# Patient Record
Sex: MALE | Race: WHITE | HISPANIC OR LATINO | Employment: UNEMPLOYED | URBAN - METROPOLITAN AREA
[De-identification: names, ages, dates, MRNs, and addresses within clinical notes are randomized per-mention and may not be internally consistent; named-entity substitution may affect disease eponyms.]

---

## 2020-08-19 ENCOUNTER — ANESTHESIA EVENT (OUTPATIENT)
Dept: SURGERY | Facility: HOSPITAL | Age: 25
DRG: 872 | End: 2020-08-19
Payer: MEDICAID

## 2020-08-19 ENCOUNTER — HOSPITAL ENCOUNTER (INPATIENT)
Facility: HOSPITAL | Age: 25
LOS: 13 days | Discharge: NURSING, REHAB OR LONG-TERM CARE | DRG: 872 | End: 2020-09-01
Attending: EMERGENCY MEDICINE | Admitting: INTERNAL MEDICINE
Payer: MEDICAID

## 2020-08-19 ENCOUNTER — ANESTHESIA (OUTPATIENT)
Dept: SURGERY | Facility: HOSPITAL | Age: 25
DRG: 872 | End: 2020-08-19
Payer: MEDICAID

## 2020-08-19 DIAGNOSIS — R07.9 CHEST PAIN: ICD-10-CM

## 2020-08-19 DIAGNOSIS — R65.10 SIRS (SYSTEMIC INFLAMMATORY RESPONSE SYNDROME): Primary | ICD-10-CM

## 2020-08-19 DIAGNOSIS — F11.20 OPIOID USE DISORDER, SEVERE, DEPENDENCE: Chronic | ICD-10-CM

## 2020-08-19 DIAGNOSIS — F41.9 ANXIETY AND DEPRESSION: ICD-10-CM

## 2020-08-19 DIAGNOSIS — M00.9 PYOGENIC ARTHRITIS OF LEFT SHOULDER REGION, DUE TO UNSPECIFIED ORGANISM: ICD-10-CM

## 2020-08-19 DIAGNOSIS — B18.2 CHRONIC HEPATITIS C WITHOUT HEPATIC COMA: ICD-10-CM

## 2020-08-19 DIAGNOSIS — M00.9 SEPTIC ARTHRITIS OF RIGHT ANKLE, DUE TO UNSPECIFIED ORGANISM: ICD-10-CM

## 2020-08-19 DIAGNOSIS — M25.50 POLYARTHRALGIA: ICD-10-CM

## 2020-08-19 DIAGNOSIS — F11.93 OPIOID WITHDRAWAL: ICD-10-CM

## 2020-08-19 DIAGNOSIS — M25.519 SHOULDER PAIN: ICD-10-CM

## 2020-08-19 DIAGNOSIS — M79.89 LEG SWELLING: ICD-10-CM

## 2020-08-19 DIAGNOSIS — F19.90 IVDU (INTRAVENOUS DRUG USER): ICD-10-CM

## 2020-08-19 DIAGNOSIS — R70.0 ELEVATED ERYTHROCYTE SEDIMENTATION RATE: ICD-10-CM

## 2020-08-19 DIAGNOSIS — M00.9 PYOGENIC ARTHRITIS OF RIGHT KNEE JOINT, DUE TO UNSPECIFIED ORGANISM: ICD-10-CM

## 2020-08-19 DIAGNOSIS — R50.9 FEVER: ICD-10-CM

## 2020-08-19 DIAGNOSIS — F32.A ANXIETY AND DEPRESSION: ICD-10-CM

## 2020-08-19 DIAGNOSIS — M13.89: ICD-10-CM

## 2020-08-19 DIAGNOSIS — R79.82 ELEVATED C-REACTIVE PROTEIN (CRP): ICD-10-CM

## 2020-08-19 DIAGNOSIS — A41.9 SEPSIS: ICD-10-CM

## 2020-08-19 LAB
ALBUMIN SERPL BCP-MCNC: 3.6 G/DL (ref 3.5–5.2)
ALP SERPL-CCNC: 110 U/L (ref 55–135)
ALT SERPL W/O P-5'-P-CCNC: 24 U/L (ref 10–44)
ANION GAP SERPL CALC-SCNC: 10 MMOL/L (ref 8–16)
APPEARANCE FLD: NORMAL
APPEARANCE FLD: NORMAL
AST SERPL-CCNC: 20 U/L (ref 10–40)
BACTERIA #/AREA URNS AUTO: ABNORMAL /HPF
BASOPHILS # BLD AUTO: 0.03 K/UL (ref 0–0.2)
BASOPHILS NFR BLD: 0.3 % (ref 0–1.9)
BILIRUB SERPL-MCNC: 0.9 MG/DL (ref 0.1–1)
BILIRUB UR QL STRIP: NEGATIVE
BODY FLD TYPE: NORMAL
BUN SERPL-MCNC: 9 MG/DL (ref 6–20)
CALCIUM SERPL-MCNC: 9.5 MG/DL (ref 8.7–10.5)
CHLORIDE SERPL-SCNC: 97 MMOL/L (ref 95–110)
CLARITY UR REFRACT.AUTO: ABNORMAL
CO2 SERPL-SCNC: 27 MMOL/L (ref 23–29)
COLOR FLD: COLORLESS
COLOR FLD: NORMAL
COLOR UR AUTO: ABNORMAL
CREAT SERPL-MCNC: 0.8 MG/DL (ref 0.5–1.4)
CRP SERPL-MCNC: 164.6 MG/L (ref 0–8.2)
CRYSTALS FLD MICRO: NEGATIVE
CRYSTALS FLD MICRO: NEGATIVE
DIFFERENTIAL METHOD: ABNORMAL
EOSINOPHIL # BLD AUTO: 0 K/UL (ref 0–0.5)
EOSINOPHIL NFR BLD: 0.1 % (ref 0–8)
ERYTHROCYTE [DISTWIDTH] IN BLOOD BY AUTOMATED COUNT: 12.7 % (ref 11.5–14.5)
ERYTHROCYTE [SEDIMENTATION RATE] IN BLOOD BY WESTERGREN METHOD: 66 MM/HR (ref 0–23)
EST. GFR  (AFRICAN AMERICAN): >60 ML/MIN/1.73 M^2
EST. GFR  (NON AFRICAN AMERICAN): >60 ML/MIN/1.73 M^2
GLUCOSE SERPL-MCNC: 92 MG/DL (ref 70–110)
GLUCOSE UR QL STRIP: NEGATIVE
GRAM STN SPEC: NORMAL
HCT VFR BLD AUTO: 40.2 % (ref 40–54)
HGB BLD-MCNC: 13.7 G/DL (ref 14–18)
HGB UR QL STRIP: ABNORMAL
HYALINE CASTS UR QL AUTO: 0 /LPF
IMM GRANULOCYTES # BLD AUTO: 0.07 K/UL (ref 0–0.04)
IMM GRANULOCYTES NFR BLD AUTO: 0.8 % (ref 0–0.5)
KETONES UR QL STRIP: NEGATIVE
LACTATE SERPL-SCNC: 1.1 MMOL/L (ref 0.5–2.2)
LEUKOCYTE ESTERASE UR QL STRIP: NEGATIVE
LYMPHOCYTES # BLD AUTO: 1.9 K/UL (ref 1–4.8)
LYMPHOCYTES NFR BLD: 20.4 % (ref 18–48)
LYMPHOCYTES NFR FLD MANUAL: 18 %
LYMPHOCYTES NFR FLD MANUAL: 4 %
MCH RBC QN AUTO: 31.2 PG (ref 27–31)
MCHC RBC AUTO-ENTMCNC: 34.1 G/DL (ref 32–36)
MCV RBC AUTO: 92 FL (ref 82–98)
MICROSCOPIC COMMENT: ABNORMAL
MONOCYTES # BLD AUTO: 1.1 K/UL (ref 0.3–1)
MONOCYTES NFR BLD: 12.5 % (ref 4–15)
MONOS+MACROS NFR FLD MANUAL: 39 %
MONOS+MACROS NFR FLD MANUAL: 6 %
NEUTROPHILS # BLD AUTO: 6 K/UL (ref 1.8–7.7)
NEUTROPHILS NFR BLD: 65.9 % (ref 38–73)
NEUTROPHILS NFR FLD MANUAL: 43 %
NEUTROPHILS NFR FLD MANUAL: 90 %
NITRITE UR QL STRIP: NEGATIVE
NRBC BLD-RTO: 0 /100 WBC
PH UR STRIP: 7 [PH] (ref 5–8)
PLATELET # BLD AUTO: 141 K/UL (ref 150–350)
PMV BLD AUTO: 12.2 FL (ref 9.2–12.9)
POTASSIUM SERPL-SCNC: 3.9 MMOL/L (ref 3.5–5.1)
PROCALCITONIN SERPL IA-MCNC: 0.67 NG/ML
PROT SERPL-MCNC: 7.9 G/DL (ref 6–8.4)
PROT UR QL STRIP: ABNORMAL
RBC # BLD AUTO: 4.39 M/UL (ref 4.6–6.2)
RBC #/AREA URNS AUTO: 49 /HPF (ref 0–4)
SARS-COV-2 RDRP RESP QL NAA+PROBE: NEGATIVE
SODIUM SERPL-SCNC: 134 MMOL/L (ref 136–145)
SP GR UR STRIP: 1.02 (ref 1–1.03)
URATE SERPL-MCNC: 2.5 MG/DL (ref 3.4–7)
URN SPEC COLLECT METH UR: ABNORMAL
WBC # BLD AUTO: 9.05 K/UL (ref 3.9–12.7)
WBC # FLD: 665 /CU MM
WBC # FLD: NORMAL /CU MM
WBC #/AREA URNS AUTO: 4 /HPF (ref 0–5)

## 2020-08-19 PROCEDURE — 27610 EXPLORE/TREAT ANKLE JOINT: CPT | Mod: RT,,, | Performed by: ORTHOPAEDIC SURGERY

## 2020-08-19 PROCEDURE — 87116 MYCOBACTERIA CULTURE: CPT | Mod: 59

## 2020-08-19 PROCEDURE — 93010 ELECTROCARDIOGRAM REPORT: CPT | Mod: ,,, | Performed by: INTERNAL MEDICINE

## 2020-08-19 PROCEDURE — 86592 SYPHILIS TEST NON-TREP QUAL: CPT

## 2020-08-19 PROCEDURE — 87206 SMEAR FLUORESCENT/ACID STAI: CPT | Mod: 91

## 2020-08-19 PROCEDURE — G0378 HOSPITAL OBSERVATION PER HR: HCPCS

## 2020-08-19 PROCEDURE — 87491 CHLMYD TRACH DNA AMP PROBE: CPT

## 2020-08-19 PROCEDURE — 63600175 PHARM REV CODE 636 W HCPCS: Performed by: ORTHOPAEDIC SURGERY

## 2020-08-19 PROCEDURE — 83605 ASSAY OF LACTIC ACID: CPT

## 2020-08-19 PROCEDURE — 96375 TX/PRO/DX INJ NEW DRUG ADDON: CPT

## 2020-08-19 PROCEDURE — D9220A PRA ANESTHESIA: Mod: CRNA,,, | Performed by: REGISTERED NURSE

## 2020-08-19 PROCEDURE — 25000003 PHARM REV CODE 250: Performed by: REGISTERED NURSE

## 2020-08-19 PROCEDURE — 93010 EKG 12-LEAD: ICD-10-PCS | Mod: ,,, | Performed by: INTERNAL MEDICINE

## 2020-08-19 PROCEDURE — 89051 BODY FLUID CELL COUNT: CPT | Mod: 91

## 2020-08-19 PROCEDURE — 63600175 PHARM REV CODE 636 W HCPCS: Performed by: REGISTERED NURSE

## 2020-08-19 PROCEDURE — U0002 COVID-19 LAB TEST NON-CDC: HCPCS

## 2020-08-19 PROCEDURE — 87040 BLOOD CULTURE FOR BACTERIA: CPT

## 2020-08-19 PROCEDURE — 63600175 PHARM REV CODE 636 W HCPCS: Performed by: STUDENT IN AN ORGANIZED HEALTH CARE EDUCATION/TRAINING PROGRAM

## 2020-08-19 PROCEDURE — 99220 PR INITIAL OBSERVATION CARE,LEVL III: CPT | Mod: 57,,, | Performed by: ORTHOPAEDIC SURGERY

## 2020-08-19 PROCEDURE — 85652 RBC SED RATE AUTOMATED: CPT

## 2020-08-19 PROCEDURE — 96366 THER/PROPH/DIAG IV INF ADDON: CPT

## 2020-08-19 PROCEDURE — 25000003 PHARM REV CODE 250: Performed by: INTERNAL MEDICINE

## 2020-08-19 PROCEDURE — 25000003 PHARM REV CODE 250: Performed by: PHYSICIAN ASSISTANT

## 2020-08-19 PROCEDURE — 89060 EXAM SYNOVIAL FLUID CRYSTALS: CPT

## 2020-08-19 PROCEDURE — 87591 N.GONORRHOEAE DNA AMP PROB: CPT

## 2020-08-19 PROCEDURE — 63600175 PHARM REV CODE 636 W HCPCS: Performed by: INTERNAL MEDICINE

## 2020-08-19 PROCEDURE — 87070 CULTURE OTHR SPECIMN AEROBIC: CPT

## 2020-08-19 PROCEDURE — 12000002 HC ACUTE/MED SURGE SEMI-PRIVATE ROOM

## 2020-08-19 PROCEDURE — 96365 THER/PROPH/DIAG IV INF INIT: CPT

## 2020-08-19 PROCEDURE — 85025 COMPLETE CBC W/AUTO DIFF WBC: CPT

## 2020-08-19 PROCEDURE — 29871 ARTHRS KNEE SURG FOR INFCTJ: CPT | Mod: 51,RT,, | Performed by: ORTHOPAEDIC SURGERY

## 2020-08-19 PROCEDURE — 37000009 HC ANESTHESIA EA ADD 15 MINS: Performed by: ORTHOPAEDIC SURGERY

## 2020-08-19 PROCEDURE — 99285 PR EMERGENCY DEPT VISIT,LEVEL V: ICD-10-PCS | Mod: ,,, | Performed by: PHYSICIAN ASSISTANT

## 2020-08-19 PROCEDURE — 81001 URINALYSIS AUTO W/SCOPE: CPT

## 2020-08-19 PROCEDURE — 84550 ASSAY OF BLOOD/URIC ACID: CPT

## 2020-08-19 PROCEDURE — 96367 TX/PROPH/DG ADDL SEQ IV INF: CPT

## 2020-08-19 PROCEDURE — 37000008 HC ANESTHESIA 1ST 15 MINUTES: Performed by: ORTHOPAEDIC SURGERY

## 2020-08-19 PROCEDURE — 80074 ACUTE HEPATITIS PANEL: CPT

## 2020-08-19 PROCEDURE — 84145 PROCALCITONIN (PCT): CPT

## 2020-08-19 PROCEDURE — 29822 SHO ARTHRS SRG LMTD DBRDMT: CPT | Mod: 51,LT,, | Performed by: ORTHOPAEDIC SURGERY

## 2020-08-19 PROCEDURE — 25000003 PHARM REV CODE 250: Performed by: STUDENT IN AN ORGANIZED HEALTH CARE EDUCATION/TRAINING PROGRAM

## 2020-08-19 PROCEDURE — 29871 PR KNEE SCOPE,CLEAN/DRAIN: ICD-10-PCS | Mod: 51,RT,, | Performed by: ORTHOPAEDIC SURGERY

## 2020-08-19 PROCEDURE — 99285 EMERGENCY DEPT VISIT HI MDM: CPT | Mod: 25

## 2020-08-19 PROCEDURE — 27610 PR EXPLORE/TREAT ANKLE JOINT: ICD-10-PCS | Mod: RT,,, | Performed by: ORTHOPAEDIC SURGERY

## 2020-08-19 PROCEDURE — 99220 PR INITIAL OBSERVATION CARE,LEVL III: ICD-10-PCS | Mod: 57,,, | Performed by: ORTHOPAEDIC SURGERY

## 2020-08-19 PROCEDURE — 86140 C-REACTIVE PROTEIN: CPT

## 2020-08-19 PROCEDURE — 27201423 OPTIME MED/SURG SUP & DEVICES STERILE SUPPLY: Performed by: ORTHOPAEDIC SURGERY

## 2020-08-19 PROCEDURE — 93005 ELECTROCARDIOGRAM TRACING: CPT

## 2020-08-19 PROCEDURE — 86703 HIV-1/HIV-2 1 RESULT ANTBDY: CPT

## 2020-08-19 PROCEDURE — 99285 EMERGENCY DEPT VISIT HI MDM: CPT | Mod: ,,, | Performed by: PHYSICIAN ASSISTANT

## 2020-08-19 PROCEDURE — 87075 CULTR BACTERIA EXCEPT BLOOD: CPT | Mod: 59

## 2020-08-19 PROCEDURE — 36000710: Performed by: ORTHOPAEDIC SURGERY

## 2020-08-19 PROCEDURE — 87040 BLOOD CULTURE FOR BACTERIA: CPT | Mod: 59

## 2020-08-19 PROCEDURE — 36000711: Performed by: ORTHOPAEDIC SURGERY

## 2020-08-19 PROCEDURE — D9220A PRA ANESTHESIA: Mod: ANES,,, | Performed by: ANESTHESIOLOGY

## 2020-08-19 PROCEDURE — D9220A PRA ANESTHESIA: ICD-10-PCS | Mod: ANES,,, | Performed by: ANESTHESIOLOGY

## 2020-08-19 PROCEDURE — 87205 SMEAR GRAM STAIN: CPT | Mod: 59

## 2020-08-19 PROCEDURE — 80053 COMPREHEN METABOLIC PANEL: CPT

## 2020-08-19 PROCEDURE — 87102 FUNGUS ISOLATION CULTURE: CPT

## 2020-08-19 PROCEDURE — D9220A PRA ANESTHESIA: ICD-10-PCS | Mod: CRNA,,, | Performed by: REGISTERED NURSE

## 2020-08-19 PROCEDURE — 63600175 PHARM REV CODE 636 W HCPCS: Performed by: PHYSICIAN ASSISTANT

## 2020-08-19 PROCEDURE — 29822 PR SHLDR ARTHROSCOP,PART DEBRIDE: ICD-10-PCS | Mod: 51,LT,, | Performed by: ORTHOPAEDIC SURGERY

## 2020-08-19 RX ORDER — INDOMETHACIN 50 MG/1
50 CAPSULE ORAL
Status: DISCONTINUED | OUTPATIENT
Start: 2020-08-19 | End: 2020-08-20

## 2020-08-19 RX ORDER — AMOXICILLIN 250 MG
1 CAPSULE ORAL 2 TIMES DAILY
Status: DISCONTINUED | OUTPATIENT
Start: 2020-08-19 | End: 2020-08-19

## 2020-08-19 RX ORDER — MIDAZOLAM HYDROCHLORIDE 1 MG/ML
INJECTION, SOLUTION INTRAMUSCULAR; INTRAVENOUS
Status: DISCONTINUED | OUTPATIENT
Start: 2020-08-19 | End: 2020-08-20

## 2020-08-19 RX ORDER — FLUOXETINE HYDROCHLORIDE 20 MG/1
20 CAPSULE ORAL DAILY
Status: ON HOLD | COMMUNITY
End: 2020-08-26 | Stop reason: HOSPADM

## 2020-08-19 RX ORDER — OXYCODONE HYDROCHLORIDE 5 MG/1
5 TABLET ORAL EVERY 6 HOURS PRN
Status: DISCONTINUED | OUTPATIENT
Start: 2020-08-19 | End: 2020-08-20

## 2020-08-19 RX ORDER — PROCHLORPERAZINE MALEATE 5 MG
10 TABLET ORAL EVERY 6 HOURS PRN
Status: DISCONTINUED | OUTPATIENT
Start: 2020-08-19 | End: 2020-09-01 | Stop reason: HOSPADM

## 2020-08-19 RX ORDER — AMOXICILLIN 250 MG
1 CAPSULE ORAL DAILY PRN
Status: DISCONTINUED | OUTPATIENT
Start: 2020-08-19 | End: 2020-09-01 | Stop reason: HOSPADM

## 2020-08-19 RX ORDER — ACETAMINOPHEN 500 MG
1000 TABLET ORAL
Status: COMPLETED | OUTPATIENT
Start: 2020-08-19 | End: 2020-08-19

## 2020-08-19 RX ORDER — IBUPROFEN 200 MG
1 TABLET ORAL DAILY
Status: DISCONTINUED | OUTPATIENT
Start: 2020-08-20 | End: 2020-08-19

## 2020-08-19 RX ORDER — DICYCLOMINE HYDROCHLORIDE 10 MG/1
10 CAPSULE ORAL 3 TIMES DAILY
Status: DISCONTINUED | OUTPATIENT
Start: 2020-08-19 | End: 2020-08-20

## 2020-08-19 RX ORDER — BUPRENORPHINE AND NALOXONE 8; 2 MG/1; MG/1
1 FILM, SOLUBLE BUCCAL; SUBLINGUAL 2 TIMES DAILY
Status: ON HOLD | COMMUNITY
End: 2020-08-26 | Stop reason: HOSPADM

## 2020-08-19 RX ORDER — LIDOCAINE HYDROCHLORIDE 20 MG/ML
INJECTION INTRAVENOUS
Status: DISCONTINUED | OUTPATIENT
Start: 2020-08-19 | End: 2020-08-20

## 2020-08-19 RX ORDER — ONDANSETRON 2 MG/ML
INJECTION INTRAMUSCULAR; INTRAVENOUS
Status: DISCONTINUED | OUTPATIENT
Start: 2020-08-19 | End: 2020-08-20

## 2020-08-19 RX ORDER — GLUCAGON 1 MG
1 KIT INJECTION
Status: DISCONTINUED | OUTPATIENT
Start: 2020-08-19 | End: 2020-09-01 | Stop reason: HOSPADM

## 2020-08-19 RX ORDER — MUPIROCIN 20 MG/G
OINTMENT TOPICAL
Status: CANCELLED | OUTPATIENT
Start: 2020-08-19

## 2020-08-19 RX ORDER — CLINDAMYCIN PHOSPHATE 900 MG/50ML
INJECTION, SOLUTION INTRAVENOUS
Status: DISCONTINUED | OUTPATIENT
Start: 2020-08-19 | End: 2020-08-20

## 2020-08-19 RX ORDER — VANCOMYCIN HYDROCHLORIDE 1 G/20ML
INJECTION, POWDER, LYOPHILIZED, FOR SOLUTION INTRAVENOUS
Status: DISCONTINUED | OUTPATIENT
Start: 2020-08-19 | End: 2020-08-20

## 2020-08-19 RX ORDER — IBUPROFEN 200 MG
16 TABLET ORAL
Status: DISCONTINUED | OUTPATIENT
Start: 2020-08-19 | End: 2020-09-01 | Stop reason: HOSPADM

## 2020-08-19 RX ORDER — KETAMINE HCL IN 0.9 % NACL 50 MG/5 ML
SYRINGE (ML) INTRAVENOUS
Status: DISCONTINUED | OUTPATIENT
Start: 2020-08-19 | End: 2020-08-20

## 2020-08-19 RX ORDER — GLYCOPYRROLATE 0.2 MG/ML
INJECTION INTRAMUSCULAR; INTRAVENOUS
Status: DISCONTINUED | OUTPATIENT
Start: 2020-08-19 | End: 2020-08-20

## 2020-08-19 RX ORDER — PANTOPRAZOLE SODIUM 40 MG/1
40 TABLET, DELAYED RELEASE ORAL DAILY
Status: DISCONTINUED | OUTPATIENT
Start: 2020-08-19 | End: 2020-09-01 | Stop reason: HOSPADM

## 2020-08-19 RX ORDER — HYDROMORPHONE HYDROCHLORIDE 1 MG/ML
1 INJECTION, SOLUTION INTRAMUSCULAR; INTRAVENOUS; SUBCUTANEOUS ONCE
Status: COMPLETED | OUTPATIENT
Start: 2020-08-19 | End: 2020-08-19

## 2020-08-19 RX ORDER — CEFTRIAXONE 1 G/1
INJECTION, POWDER, FOR SOLUTION INTRAMUSCULAR; INTRAVENOUS
Status: DISCONTINUED | OUTPATIENT
Start: 2020-08-19 | End: 2020-08-20

## 2020-08-19 RX ORDER — COLCHICINE 0.6 MG/1
0.6 TABLET, FILM COATED ORAL 2 TIMES DAILY
Status: DISCONTINUED | OUTPATIENT
Start: 2020-08-19 | End: 2020-08-20

## 2020-08-19 RX ORDER — VANCOMYCIN HCL IN 5 % DEXTROSE 1G/250ML
1000 PLASTIC BAG, INJECTION (ML) INTRAVENOUS
Status: CANCELLED | OUTPATIENT
Start: 2020-08-19

## 2020-08-19 RX ORDER — PHENYLEPHRINE HYDROCHLORIDE 10 MG/ML
INJECTION INTRAVENOUS
Status: DISCONTINUED | OUTPATIENT
Start: 2020-08-19 | End: 2020-08-20

## 2020-08-19 RX ORDER — SODIUM CHLORIDE 9 MG/ML
INJECTION, SOLUTION INTRAVENOUS CONTINUOUS
Status: CANCELLED | OUTPATIENT
Start: 2020-08-19

## 2020-08-19 RX ORDER — HYDROMORPHONE HYDROCHLORIDE 1 MG/ML
0.2 INJECTION, SOLUTION INTRAMUSCULAR; INTRAVENOUS; SUBCUTANEOUS EVERY 5 MIN PRN
Status: DISCONTINUED | OUTPATIENT
Start: 2020-08-19 | End: 2020-08-20 | Stop reason: HOSPADM

## 2020-08-19 RX ORDER — FENTANYL CITRATE 50 UG/ML
INJECTION, SOLUTION INTRAMUSCULAR; INTRAVENOUS
Status: DISCONTINUED | OUTPATIENT
Start: 2020-08-19 | End: 2020-08-20

## 2020-08-19 RX ORDER — ONDANSETRON 8 MG/1
8 TABLET, ORALLY DISINTEGRATING ORAL EVERY 8 HOURS PRN
Status: DISCONTINUED | OUTPATIENT
Start: 2020-08-19 | End: 2020-09-01 | Stop reason: HOSPADM

## 2020-08-19 RX ORDER — TALC
6 POWDER (GRAM) TOPICAL NIGHTLY PRN
Status: DISCONTINUED | OUTPATIENT
Start: 2020-08-19 | End: 2020-09-01 | Stop reason: HOSPADM

## 2020-08-19 RX ORDER — SODIUM CHLORIDE 0.9 % (FLUSH) 0.9 %
10 SYRINGE (ML) INJECTION
Status: DISCONTINUED | OUTPATIENT
Start: 2020-08-19 | End: 2020-09-01 | Stop reason: HOSPADM

## 2020-08-19 RX ORDER — ROCURONIUM BROMIDE 10 MG/ML
INJECTION, SOLUTION INTRAVENOUS
Status: DISCONTINUED | OUTPATIENT
Start: 2020-08-19 | End: 2020-08-20

## 2020-08-19 RX ORDER — IBUPROFEN 200 MG
24 TABLET ORAL
Status: DISCONTINUED | OUTPATIENT
Start: 2020-08-19 | End: 2020-09-01 | Stop reason: HOSPADM

## 2020-08-19 RX ORDER — DIAZEPAM 5 MG/1
5 TABLET ORAL EVERY 6 HOURS PRN
Status: DISCONTINUED | OUTPATIENT
Start: 2020-08-19 | End: 2020-08-20

## 2020-08-19 RX ORDER — DEXAMETHASONE SODIUM PHOSPHATE 4 MG/ML
INJECTION, SOLUTION INTRA-ARTICULAR; INTRALESIONAL; INTRAMUSCULAR; INTRAVENOUS; SOFT TISSUE
Status: DISCONTINUED | OUTPATIENT
Start: 2020-08-19 | End: 2020-08-20

## 2020-08-19 RX ORDER — ARIPIPRAZOLE 5 MG/1
5 TABLET ORAL DAILY
COMMUNITY

## 2020-08-19 RX ADMIN — PHENYLEPHRINE HYDROCHLORIDE 100 MCG: 10 INJECTION INTRAVENOUS at 10:08

## 2020-08-19 RX ADMIN — Medication 10 MG: at 09:08

## 2020-08-19 RX ADMIN — PHENYLEPHRINE HYDROCHLORIDE 200 MCG: 10 INJECTION INTRAVENOUS at 10:08

## 2020-08-19 RX ADMIN — SODIUM CHLORIDE, SODIUM LACTATE, POTASSIUM CHLORIDE, AND CALCIUM CHLORIDE 1000 ML: .6; .31; .03; .02 INJECTION, SOLUTION INTRAVENOUS at 04:08

## 2020-08-19 RX ADMIN — ACETAMINOPHEN 1000 MG: 500 TABLET ORAL at 12:08

## 2020-08-19 RX ADMIN — CLINDAMYCIN PHOSPHATE 900 MG: 18 INJECTION, SOLUTION INTRAVENOUS at 10:08

## 2020-08-19 RX ADMIN — MIDAZOLAM HYDROCHLORIDE 2 MG: 1 INJECTION, SOLUTION INTRAMUSCULAR; INTRAVENOUS at 09:08

## 2020-08-19 RX ADMIN — SODIUM CHLORIDE, SODIUM GLUCONATE, SODIUM ACETATE, POTASSIUM CHLORIDE, MAGNESIUM CHLORIDE, SODIUM PHOSPHATE, DIBASIC, AND POTASSIUM PHOSPHATE: .53; .5; .37; .037; .03; .012; .00082 INJECTION, SOLUTION INTRAVENOUS at 11:08

## 2020-08-19 RX ADMIN — COLCHICINE 0.6 MG: 0.6 TABLET, FILM COATED ORAL at 04:08

## 2020-08-19 RX ADMIN — DEXTROSE MONOHYDRATE 1250 MG: 50 INJECTION, SOLUTION INTRAVENOUS at 12:08

## 2020-08-19 RX ADMIN — ONDANSETRON 4 MG: 2 INJECTION, SOLUTION INTRAMUSCULAR; INTRAVENOUS at 11:08

## 2020-08-19 RX ADMIN — SODIUM CHLORIDE, SODIUM GLUCONATE, SODIUM ACETATE, POTASSIUM CHLORIDE, MAGNESIUM CHLORIDE, SODIUM PHOSPHATE, DIBASIC, AND POTASSIUM PHOSPHATE: .53; .5; .37; .037; .03; .012; .00082 INJECTION, SOLUTION INTRAVENOUS at 10:08

## 2020-08-19 RX ADMIN — SODIUM CHLORIDE, SODIUM GLUCONATE, SODIUM ACETATE, POTASSIUM CHLORIDE, MAGNESIUM CHLORIDE, SODIUM PHOSPHATE, DIBASIC, AND POTASSIUM PHOSPHATE: .53; .5; .37; .037; .03; .012; .00082 INJECTION, SOLUTION INTRAVENOUS at 09:08

## 2020-08-19 RX ADMIN — Medication 10 MG: at 10:08

## 2020-08-19 RX ADMIN — GLYCOPYRROLATE 0.2 MG: 0.2 INJECTION, SOLUTION INTRAMUSCULAR; INTRAVENOUS at 10:08

## 2020-08-19 RX ADMIN — Medication 20 MG: at 11:08

## 2020-08-19 RX ADMIN — PIPERACILLIN SODIUM AND TAZOBACTAM SODIUM 4.5 G: 4; .5 INJECTION, POWDER, LYOPHILIZED, FOR SOLUTION INTRAVENOUS at 12:08

## 2020-08-19 RX ADMIN — SUGAMMADEX 200 MG: 100 INJECTION, SOLUTION INTRAVENOUS at 11:08

## 2020-08-19 RX ADMIN — PHENYLEPHRINE HYDROCHLORIDE 150 MCG: 10 INJECTION INTRAVENOUS at 10:08

## 2020-08-19 RX ADMIN — DIAZEPAM 5 MG: 5 TABLET ORAL at 05:08

## 2020-08-19 RX ADMIN — DICYCLOMINE HYDROCHLORIDE 10 MG: 10 CAPSULE ORAL at 08:08

## 2020-08-19 RX ADMIN — DEXAMETHASONE SODIUM PHOSPHATE 8 MG: 4 INJECTION, SOLUTION INTRAMUSCULAR; INTRAVENOUS at 10:08

## 2020-08-19 RX ADMIN — ROCURONIUM BROMIDE 50 MG: 10 INJECTION, SOLUTION INTRAVENOUS at 09:08

## 2020-08-19 RX ADMIN — FENTANYL CITRATE 50 MCG: 50 INJECTION, SOLUTION INTRAMUSCULAR; INTRAVENOUS at 09:08

## 2020-08-19 RX ADMIN — HYDROMORPHONE HYDROCHLORIDE 1 MG: 1 INJECTION, SOLUTION INTRAMUSCULAR; INTRAVENOUS; SUBCUTANEOUS at 06:08

## 2020-08-19 RX ADMIN — SODIUM CHLORIDE 1905 ML: 0.9 INJECTION, SOLUTION INTRAVENOUS at 12:08

## 2020-08-19 RX ADMIN — LIDOCAINE HYDROCHLORIDE 100 MG: 20 INJECTION, SOLUTION INTRAVENOUS at 09:08

## 2020-08-19 RX ADMIN — OXYCODONE 5 MG: 5 TABLET ORAL at 08:08

## 2020-08-19 RX ADMIN — PANTOPRAZOLE SODIUM 40 MG: 40 TABLET, DELAYED RELEASE ORAL at 04:08

## 2020-08-19 RX ADMIN — ROCURONIUM BROMIDE 20 MG: 10 INJECTION, SOLUTION INTRAVENOUS at 11:08

## 2020-08-19 NOTE — ED NOTES
Patient identifiers verified and correct for Mr Koroma  C/C: Shoulder, knee and ankle pain   APPEARANCE: awake and alert in NAD.  SKIN: warm, dry and intact. No breakdown or bruising.  MUSCULOSKELETAL: Patient moving all extremities spontaneously, no obvious swelling or deformities noted. Ambulates independently.  RESPIRATORY: Denies shortness of breath.Respirations unlabored. Denies cough, temp in triage  CARDIAC: Denies CP, 2+ distal pulses; no peripheral edema  ABDOMEN: S/ND/NT, Denies nausea  : voids spontaneously, denies difficulty  Neurologic: AAO x 4; follows commands equal strength in all extremities; denies numbness/tingling. Denies dizziness Painn to shoulder , ankle and knee

## 2020-08-19 NOTE — ASSESSMENT & PLAN NOTE
24 year old M with a PMHx of IV drug abuse and depression presented to the ED complaining of left shoulder pain, right knee pain, and right ankle pain. On arrival temp 101.4, pulse 111, elevated CRP and Sed rate. WBC wnl. He has significant decreased ROM of left shoulder and swelling of right knee. Infectious workup started in the ED. Vanc and zosyn given in the ED.    Xray left shoulder, CXR, and US right lower leg: no significant findings    Plan:   -- Ortho consulted for septic arthritis r/o. Follwing recs  -- Xray right knee and ankle ordered  -- Blood cultures pending  -- Chlamydia and gonnorhea DNA, RPR, HIV, hepatitis panel pending   -- Procalcitonin pending

## 2020-08-19 NOTE — SUBJECTIVE & OBJECTIVE
Past Medical History:   Diagnosis Date    IVDU (intravenous drug user) 8/19/2020       History reviewed. No pertinent surgical history.    Review of patient's allergies indicates:   Allergen Reactions    Penicillins Hives     Unclear of last episode of allergic reaction       No current facility-administered medications on file prior to encounter.      Current Outpatient Medications on File Prior to Encounter   Medication Sig    ARIPiprazole (ABILIFY) 10 MG Tab Take 5 mg by mouth once daily.    buprenorphine-naloxone (SUBOXONE) 8-2 mg Film Place under the tongue.    FLUoxetine 10 MG capsule Take 10 mg by mouth once daily.     Family History     None        Tobacco Use    Smoking status: Current Every Day Smoker     Packs/day: 1.00     Years: 5.00     Pack years: 5.00     Types: Cigarettes    Smokeless tobacco: Never Used   Substance and Sexual Activity    Alcohol use: Not Currently    Drug use: Yes     Types: Opium    Sexual activity: Yes     Partners: Female     Birth control/protection: None     Review of Systems   Constitutional: Positive for activity change, chills and fever. Negative for appetite change.   HENT: Negative for congestion, sore throat and trouble swallowing.    Eyes: Negative for photophobia, pain and discharge.   Respiratory: Negative for cough and shortness of breath.    Cardiovascular: Negative for chest pain and palpitations.   Gastrointestinal: Negative for abdominal pain, diarrhea, nausea and vomiting.   Genitourinary: Negative for difficulty urinating and discharge.   Musculoskeletal: Positive for arthralgias and joint swelling. Negative for back pain, myalgias and neck pain.   Skin: Positive for color change. Negative for pallor and rash.        Redness of right knee   Neurological: Negative for light-headedness, numbness and headaches.     Objective:     Vital Signs (Most Recent):  Temp: 99.6 °F (37.6 °C) (08/19/20 1710)  Pulse: 94 (08/19/20 1730)  Resp: 16 (08/19/20 1730)  BP:  124/70 (08/19/20 1730)  SpO2: 95 % (08/19/20 1730) Vital Signs (24h Range):  Temp:  [99.2 °F (37.3 °C)-101.4 °F (38.6 °C)] 99.6 °F (37.6 °C)  Pulse:  [] 94  Resp:  [16-20] 16  SpO2:  [95 %-99 %] 95 %  BP: (108-138)/(61-76) 124/70     Weight: 63.5 kg (140 lb)  Body mass index is 21.29 kg/m².    Physical Exam  Constitutional:       Appearance: Normal appearance.      Comments: Patient appears to be in pain, holding left arm close to body. Tremulous.   Eyes:      Conjunctiva/sclera: Conjunctivae normal.      Pupils: Pupils are equal, round, and reactive to light.   Cardiovascular:      Rate and Rhythm: Normal rate and regular rhythm.      Pulses: Normal pulses.      Heart sounds: Normal heart sounds.   Pulmonary:      Effort: Pulmonary effort is normal. No respiratory distress.      Breath sounds: Normal breath sounds.   Abdominal:      General: Bowel sounds are normal. There is no distension.      Palpations: Abdomen is soft.      Tenderness: There is no abdominal tenderness.   Musculoskeletal:         General: Swelling present.      Left shoulder: He exhibits decreased range of motion, tenderness and pain.      Right knee: He exhibits decreased range of motion, swelling and erythema. Tenderness found.      Right ankle: He exhibits decreased range of motion. Tenderness.   Skin:     General: Skin is warm and dry.      Findings: Erythema present. No bruising or rash.   Neurological:      Mental Status: He is alert and oriented to person, place, and time.           CRANIAL NERVES     CN III, IV, VI   Pupils are equal, round, and reactive to light.       Significant Labs:   BMP:   Recent Labs   Lab 08/19/20  1140   GLU 92   *   K 3.9   CL 97   CO2 27   BUN 9   CREATININE 0.8   CALCIUM 9.5     CBC:   Recent Labs   Lab 08/19/20  1140   WBC 9.05   HGB 13.7*   HCT 40.2   *       Significant Imaging: I have reviewed all pertinent imaging results/findings within the past 24 hours.

## 2020-08-19 NOTE — MEDICAL/APP STUDENT
HISTORY & PHYSICAL  Hospital Medicine    Team: [facility] 2    PRESENTING HISTORY     Chief Complaint/Reason for Admission:  Possible bacteremia     History of Present Illness:  Mr. Bebo Koroma is a 24 y.o. male with a past medical hx of active drug use presents with 3 day L shoulder, R knee and ankle pain.     Pt states that he began having the pain with associated fevers and chills 3 days ago that has been progressively getting worse. Pt states that he is no longer able to ambulate on his R leg and cannot move his L shoulder. Pt also describes the pain as shooting. The L shoulder pain shoots down to the middle of his L arm and the R ankle pain shoots up towards his thigh. Pt rates the pain as 8/10. The patient denies any strenuous activity or falls but mentions that he did IV opiates 3 days ago.     Pt states that he took Tylenol to try to relieve his pain but it did not help.     Review of Systems:  Constitutional  Positive for fevers, chills, general fatigue, weakness, night sweats  Negative for N/V, change in weight     Head  Negative for headache, trauma, confusion, change in vision     Neuro  Positive for focal weakness in L arm and R leg  Negative for syncope, numbness, tingling, neck stiffness, decreased sensation     ENT  Negative for hearing loss, tinnitus, sore throat, voice hoarseness, epistaxis, swollen lymph nodes    Cardiac  Negative for chest pain, palpitations, PND    Resp  Negative for SOB, pain on inspiration, cough, hemoptysis, wheezing     GI  Negative for abdominal pain, change in BM, bleeding       Negative for urgency, dysuria, nocturia, frequency, hematuria, incontinence    MSK  Positive for pain in L shoulder and R knee and ankle, redness and swelling in R knee and ankle, and decreased ROM of L shoulder and R knee and ankle     PAST HISTORY:     Past Medical History:   Diagnosis Date    IVDU (intravenous drug user) 8/19/2020       History reviewed. No pertinent surgical  history.    History reviewed. No pertinent family history.    Social History     Socioeconomic History    Marital status: Single     Spouse name: Not on file    Number of children: Not on file    Years of education: Not on file    Highest education level: Not on file   Occupational History    Not on file   Social Needs    Financial resource strain: Not on file    Food insecurity     Worry: Not on file     Inability: Not on file    Transportation needs     Medical: Not on file     Non-medical: Not on file   Tobacco Use    Smoking status: Current Every Day Smoker     Packs/day: 1.00     Years: 5.00     Pack years: 5.00     Types: Cigarettes    Smokeless tobacco: Never Used   Substance and Sexual Activity    Alcohol use: Not Currently    Drug use: Yes     Types: Opium    Sexual activity: Yes     Partners: Female     Birth control/protection: None   Lifestyle    Physical activity     Days per week: Not on file     Minutes per session: Not on file    Stress: Not on file   Relationships    Social connections     Talks on phone: Not on file     Gets together: Not on file     Attends Synagogue service: Not on file     Active member of club or organization: Not on file     Attends meetings of clubs or organizations: Not on file     Relationship status: Not on file   Other Topics Concern    Not on file   Social History Narrative    Not on file       MEDICATIONS & ALLERGIES:     No current facility-administered medications on file prior to encounter.      Current Outpatient Medications on File Prior to Encounter   Medication Sig Dispense Refill    ARIPiprazole (ABILIFY) 10 MG Tab Take 5 mg by mouth once daily.      buprenorphine-naloxone (SUBOXONE) 8-2 mg Film Place under the tongue.      FLUoxetine 10 MG capsule Take 10 mg by mouth once daily.          Review of patient's allergies indicates:   Allergen Reactions    Penicillins Hives     Unclear of last episode of allergic reaction       OBJECTIVE:      Vital Signs:  Temp:  [99.2 °F (37.3 °C)-101.4 °F (38.6 °C)] 99.2 °F (37.3 °C)  Pulse:  [] 88  Resp:  [19-20] 19  SpO2:  [97 %-99 %] 99 %  BP: (108-138)/(61-76) 108/61  Body mass index is 21.29 kg/m².     Physical Exam:  Pt A&OX4 and in no acute distress     Head  Negative trauma, scars/bumps     Eyes  Pupils dilated bilaterally     ENT  Normal hearing bilaterally   Negative nasal discharge  Normal dentition   Moist mucous membranes     Cardiac  Regular S1, S2 heart sounds  Negative appreciable murmurs   Negative leg edema  Normal cap refill and distal pulses     Pulm  Normal heart sounds     Abdominal  Normal bowel sounds  Abdomen soft/non-tender   No distension/fluid     Skin  Skin excoriation in medial aspect of R ankle - Pt states that possible flea bites   Redness and swelling of R knee and R ankle   Warmth in L shoulder, R knee and R ankle    Strength  Normal muscle tone/bulk  Decreased ROM of L shoulder due to pain   Decreased ROM of R ankle due to pain   Weakness in L shoulder, R knee and R ankle       Laboratory  Lab Results   Component Value Date    WBC 9.05 08/19/2020    HGB 13.7 (L) 08/19/2020    HCT 40.2 08/19/2020    MCV 92 08/19/2020     (L) 08/19/2020     Recent Labs   Lab 08/19/20  1140   GLU 92   *   K 3.9   CL 97   CO2 27   BUN 9   CREATININE 0.8   CALCIUM 9.5     No results found for: INR, PROTIME  No results found for: HGBA1C  No results for input(s): POCTGLUCOSE in the last 72 hours.    Diagnostic Results:    ASSESSMENT & PLAN:     Current Problems List:  Active Hospital Problems    Diagnosis  POA    SIRS (systemic inflammatory response syndrome) [R65.10]  Yes    Elevated erythrocyte sedimentation rate [R70.0]  Yes    Elevated C-reactive protein (CRP) [R79.82]  Yes    IVDU (intravenous drug user) [F19.90]  Yes    Oligoarthritis of multiple sites - L shoulder, R knee, R ankle [M13.89]  Yes      Resolved Hospital Problems   No resolved problems to display.       HIGH  RISK CONDITION(S):  IVDU    Problem Assessment & Treatment Plan:    My top differential is septic arthritis, most likely from bacteremia from IVDU, but can also be from STI  The various pain limited to the multiple joints as well as acute onset leads me to this top differential.     Rule out:   Osteomyelitis   Reactive arthritis   Trauma/fracture/MSK injury   DVT        Possible septic arthritis:   - XR of L shoulder, R knee, R ankle  - U/A   - Arthrocentesis and culture   - Blood cultures   - Trend ESR/CRP   - Monitor for sepsis   - HIV, hepatitis panels   - Ortho consult   - Echo     Hyponatremia   - Sodium repletion     Robert Dai, MS3

## 2020-08-19 NOTE — HOSPITAL COURSE
Pt admitted to IM 2 for further management of sepsis (source unknown at the time). Ortho consulted to r/o septic arthritis. Xray of right knee and ankle ordered. Psych consulted for management of addiction and medications (on Abilify, Prozac, and Subox-one at home). R knee tapped (31K WBC, 90% segs) R ankle tapped (600 WBC, 43% seg), L shoulder tapped (clotted). Cultures pending, NGTD. Taken to the OR on 8/19 for arthroscopy of knee and shoulder and arthrotomy of ankle. 8/20 pt in pain, crying, sweating with chills. Subox-one unable to be restarted due to patient being on opioids. Adjusted pain medications for better pain control. Started opioid withdrawal protocol per psych recs. Restarting Abilify and Prozac per psych recs. Patient has had outburst with staff, stating his pain is not being controlled. Acute pain service consulted for further recs. Increased Prozac to 20 mg daily due to patient stating he takes 40 mg at home and his mood swings. Pending LTAC placement at this time.

## 2020-08-19 NOTE — PLAN OF CARE
Pt alert and oriented. Vital signs normal at this time. Plan of care discussed with pt. Pt states that he has no intention to harm himself.

## 2020-08-19 NOTE — ED TRIAGE NOTES
Patient states in ED for left shoulder, right knee nad right ankle pain onset Sunday, denies injury, Motrin today. Temp in triage. Suboxone this am

## 2020-08-19 NOTE — HPI
Mr. Koroma is a 24 year old M with a PMHx of IV drug abuse and depression that presented to the ED complaining of left shoulder pain, right knee pain, and right ankle pain. Pain started when the patient woke up about 3 days ago, mostly left shoulder pain at that time. He then developed right ankle pain and right knee pain. He describes the pain as constant, sharp and worse with movement. Shoulder pain radiates down to the antecubital fossa region. He tried taking Tylenol with no relief. He has experienced significant decrease in ROM of left shoulder and states that he could not walk for 2 days due to his pain. Reports subjective fevers, chills, fatigue, general weakness, night sweats. Denies N/V, chest pain, SOB, abdominal pain, diarrhea, pain with urination. Patient reports that he last used IV opioids about 3 days ago. Injects into the left arm. On Subox-one at home but has not taken in 3 days. Denies alcohol use and other recreational drugs.     Pt arrived to ED with temp of 101.4, tachycardic elevated CRP and ESR. WBC and lactate wnl. Blood cultures done and 1 dose of vanc and zosyn given along with IVF. Xray L shoulder with no significant findings.

## 2020-08-19 NOTE — ASSESSMENT & PLAN NOTE
Pt reports use of IV drugs since the age of 17. States he has consistently used them for 5 years and became sober 6 months ago. He is on Subox-one at home which he last took 3 days ago. He relapsed last week. Last IV opioid use was 3 days ago. Patient has a 1 week old  at home that could be contributing to the relapse. On exam he was tremulous with mildly dilated pupils. He reports recent sweating and cold chills. He denies N/V diarrhea. With his chronic use, he will likely be hyper-analgesic.     Plan:  -- COWS: 7  -- Diazepam PRN  -- Psych consulted for addiction management. Following recs  -- Continue to monitor for signs of withdrawl

## 2020-08-19 NOTE — H&P
[facility] Hospital Medicine  History & Physical    Patient Name: Bebo Koroma  MRN: [id number]  Admission Date: 8/19/2020  Attending Physician: Cuauhtemoc Osborne MD   Primary Care Provider: Primary Doctor [facility] Medicine Team: [facility] [place] [facility] MED 2 Daly Hale MD     Patient information was obtained from patient and ER records.     Subjective:     Principal Problem:SIRS (systemic inflammatory response syndrome)    Chief Complaint:   Chief Complaint   Patient presents with    Fever     left shoulder, right ankle, right knee pain. x 3-4 days. no trauma. no cough. no fever. denies sob. denies sick contacts.     Joint Pain        HPI: Mr. Koroma is a 24 year old M with a PMHx of IV drug abuse and depression that presented to the ED complaining of left shoulder pain, right knee pain, and right ankle pain. Pain started when the patient woke up about 3 days ago, mostly left shoulder pain at that time. He then developed right ankle pain and right knee pain. He describes the pain as constant, sharp and worse with movement. Shoulder pain radiates down to the antecubital fossa region. He tried taking Tylenol with no relief. He has experienced significant decrease in ROM of left shoulder and states that he could not walk for 2 days due to his pain. Reports subjective fevers, chills, fatigue, general weakness, night sweats. Denies N/V, chest pain, SOB, abdominal pain, diarrhea, pain with urination. Patient reports that he last used IV opioids about 3 days ago. Injects into the left arm. On Subox-one at home but has not taken in 3 days. Denies alcohol use and other recreational drugs.     Pt arrived to ED with temp of 101.4, tachycardic elevated CRP and ESR. WBC and lactate wnl. Blood cultures done and 1 dose of vanc and zosyn given along with IVF. Xray L shoulder with no significant findings.     Past Medical History:   Diagnosis Date    IVDU (intravenous drug user) 8/19/2020       History reviewed.  No pertinent surgical history.    Review of patient's allergies indicates:   Allergen Reactions    Penicillins Hives     Unclear of last episode of allergic reaction       No current facility-administered medications on file prior to encounter.      Current Outpatient Medications on File Prior to Encounter   Medication Sig    ARIPiprazole (ABILIFY) 10 MG Tab Take 5 mg by mouth once daily.    buprenorphine-naloxone (SUBOXONE) 8-2 mg Film Place under the tongue.    FLUoxetine 10 MG capsule Take 10 mg by mouth once daily.     Family History     None        Tobacco Use    Smoking status: Current Every Day Smoker     Packs/day: 1.00     Years: 5.00     Pack years: 5.00     Types: Cigarettes    Smokeless tobacco: Never Used   Substance and Sexual Activity    Alcohol use: Not Currently    Drug use: Yes     Types: Opium    Sexual activity: Yes     Partners: Female     Birth control/protection: None     Review of Systems   Constitutional: Positive for activity change, chills and fever. Negative for appetite change.   HENT: Negative for congestion, sore throat and trouble swallowing.    Eyes: Negative for photophobia, pain and discharge.   Respiratory: Negative for cough and shortness of breath.    Cardiovascular: Negative for chest pain and palpitations.   Gastrointestinal: Negative for abdominal pain, diarrhea, nausea and vomiting.   Genitourinary: Negative for difficulty urinating and discharge.   Musculoskeletal: Positive for arthralgias and joint swelling. Negative for back pain, myalgias and neck pain.   Skin: Positive for color change. Negative for pallor and rash.        Redness of right knee   Neurological: Negative for light-headedness, numbness and headaches.     Objective:     Vital Signs (Most Recent):  Temp: 99.6 °F (37.6 °C) (08/19/20 1710)  Pulse: 94 (08/19/20 1730)  Resp: 16 (08/19/20 1730)  BP: 124/70 (08/19/20 1730)  SpO2: 95 % (08/19/20 1730) Vital Signs (24h Range):  Temp:  [99.2 °F (37.3  °C)-101.4 °F (38.6 °C)] 99.6 °F (37.6 °C)  Pulse:  [] 94  Resp:  [16-20] 16  SpO2:  [95 %-99 %] 95 %  BP: (108-138)/(61-76) 124/70     Weight: 63.5 kg (140 lb)  Body mass index is 21.29 kg/m².    Physical Exam  Constitutional:       Appearance: Normal appearance.      Comments: Patient appears to be in pain, holding left arm close to body. Tremulous.   Eyes:      Conjunctiva/sclera: Conjunctivae normal.      Pupils: Pupils are equal, round, and reactive to light.   Cardiovascular:      Rate and Rhythm: Normal rate and regular rhythm.      Pulses: Normal pulses.      Heart sounds: Normal heart sounds.   Pulmonary:      Effort: Pulmonary effort is normal. No respiratory distress.      Breath sounds: Normal breath sounds.   Abdominal:      General: Bowel sounds are normal. There is no distension.      Palpations: Abdomen is soft.      Tenderness: There is no abdominal tenderness.   Musculoskeletal:         General: Swelling present.      Left shoulder: He exhibits decreased range of motion, tenderness and pain.      Right knee: He exhibits decreased range of motion, swelling and erythema. Tenderness found.      Right ankle: He exhibits decreased range of motion. Tenderness.   Skin:     General: Skin is warm and dry.      Findings: Erythema present. No bruising or rash.   Neurological:      Mental Status: He is alert and oriented to person, place, and time.           CRANIAL NERVES     CN III, IV, VI   Pupils are equal, round, and reactive to light.       Significant Labs:   BMP:   Recent Labs   Lab 08/19/20  1140   GLU 92   *   K 3.9   CL 97   CO2 27   BUN 9   CREATININE 0.8   CALCIUM 9.5     CBC:   Recent Labs   Lab 08/19/20  1140   WBC 9.05   HGB 13.7*   HCT 40.2   *       Significant Imaging: I have reviewed all pertinent imaging results/findings within the past 24 hours.    Assessment/Plan:     * SIRS (systemic inflammatory response syndrome)  24 year old M with a PMHx of IV drug abuse and  depression presented to the ED complaining of left shoulder pain, right knee pain, and right ankle pain. On arrival temp 101.4, pulse 111, elevated CRP and Sed rate. WBC wnl. He has significant decreased ROM of left shoulder and swelling of right knee. Infectious workup started in the ED. Vanc and zosyn given in the ED.    Xray left shoulder, CXR, and US right lower leg: no significant findings    Plan:   -- Ortho consulted for septic arthritis r/o. Follwing recs  -- Xray right knee and ankle ordered  -- Blood cultures pending  -- Chlamydia and gonnorhea DNA, RPR, HIV, hepatitis panel pending   -- Procalcitonin pending        Oligoarthritis of multiple sites - L shoulder, R knee, R ankle  IV drug user presented to the ED complaining of left shoulder pain, right knee pain, and right ankle pain. On arrival temp 101.4, pulse 111, elevated CRP and Sed rate. WBC wnl. He has significant decreased ROM of left shoulder and swelling of right knee. He states his pain has been severe, leading him unable to walk for 2 days.    Xray left shoulder and US right lower leg: no significant findings    Plan:   -- Ortho consulted. Following recs  -- Xray right knee and ankle ordered  -- Colchicine and Indomethacin ordered      IVDU (intravenous drug user)  Pt reports use of IV drugs since the age of 17. States he has consistently used them for 5 years and became sober 6 months ago. He is on Subox-one at home which he last took 3 days ago. He relapsed last week. Last IV opioid use was 3 days ago. Patient has a 1 week old  at home that could be contributing to the relapse. On exam he was tremulous with mildly dilated pupils. He reports recent sweating and cold chills. He denies N/V diarrhea. With his chronic use, he will likely be hyper-analgesic.     Plan:  -- COWS: 7  -- Diazepam PRN  -- Psych consulted for addiction management. Following recs  -- Continue to monitor for signs of withdrawl      Anxiety and depression  Pt reports  hx of anxiety/depression on Abilify and Prozac at home. He states he takes Prozac 40 mg daily and Abilify 20 mg daily which is different than how is documented in the chart.     Plan:  -- Psych consulted for management of medications. Following recs.  -- Holding home meds until recs.  -- Diazepam PRN          Elevated C-reactive protein (CRP)  See SIRS      Elevated erythrocyte sedimentation rate  See SIRS        VTE Risk Mitigation (From admission, onward)         Ordered     Place sequential compression device  Until discontinued      08/19/20 1643     IP VTE LOW RISK PATIENT  Once      08/19/20 1643                   Daly Hale MD  Department of Hospital Medicine   [facility]

## 2020-08-19 NOTE — ASSESSMENT & PLAN NOTE
Pt reports hx of anxiety/depression on Abilify and Prozac at home. He states he takes Prozac 40 mg daily and Abilify 20 mg daily which is different than how is documented in the chart.     Plan:  -- Psych consulted for management of medications. Following recs.  -- Holding home meds until recs.  -- Diazepam PRN

## 2020-08-19 NOTE — ASSESSMENT & PLAN NOTE
IV drug user presented to the ED complaining of left shoulder pain, right knee pain, and right ankle pain. On arrival temp 101.4, pulse 111, elevated CRP and Sed rate. WBC wnl. He has significant decreased ROM of left shoulder and swelling of right knee. He states his pain has been severe, leading him unable to walk for 2 days.    Xray left shoulder, CXR, and US right lower leg: no significant findings    Plan:   -- Ortho consulted. Following recs  -- Xray right knee and ankle ordered  -- Colchicine and Indomethacin ordered

## 2020-08-19 NOTE — ED PROVIDER NOTES
Encounter Date: 8/19/2020       History     Chief Complaint   Patient presents with    Fever     left shoulder, right ankle, right knee pain. x 3-4 days. no trauma. no cough. no fever. denies sob. denies sick contacts.     Joint Pain     24-year-old male with a history of IVDU presents to the ED with joint pain.  Patient reports severe pain to his left shoulder, right ankle and right knee for the past 3 or 4 days.  He feels that his right lower leg is swollen.  Has severe pain with moving his left shoulder and with full extension of his knee. Patient was unaware that he had a fever.  He denies generalized weakness, headache, dysuria, penile discharge, abdominal pain, nausea, vomiting, diarrhea, chest pain, cough, shortness of breath.  He last used IV drugs 3 days ago.  He is currently on Suboxone.  He denies any known COVID exposure.              Review of patient's allergies indicates:   Allergen Reactions    Penicillins Hives     Unclear of last episode of allergic reaction     Past Medical History:   Diagnosis Date    IVDU (intravenous drug user) 8/19/2020     History reviewed. No pertinent surgical history.  History reviewed. No pertinent family history.  Social History     Tobacco Use    Smoking status: Current Every Day Smoker     Packs/day: 1.00     Years: 5.00     Pack years: 5.00     Types: Cigarettes    Smokeless tobacco: Never Used   Substance Use Topics    Alcohol use: Not Currently    Drug use: Yes     Types: Opium     Review of Systems   Constitutional: Positive for fever.   HENT: Negative for sore throat.    Respiratory: Negative for shortness of breath.    Cardiovascular: Negative for chest pain.   Gastrointestinal: Negative for abdominal pain, nausea and vomiting.   Genitourinary: Negative for dysuria.   Musculoskeletal: Positive for arthralgias. Negative for back pain.   Skin: Negative for rash.   Neurological: Negative for weakness.   Hematological: Does not bruise/bleed easily.        Physical Exam     Initial Vitals [08/19/20 1034]   BP Pulse Resp Temp SpO2   138/76 (!) 111 20 (!) 101.4 °F (38.6 °C) 97 %      MAP       --         Physical Exam    Nursing note and vitals reviewed.  Constitutional: He appears well-developed and well-nourished.  Non-toxic appearance. He does not appear ill. No distress.   HENT:   Head: Normocephalic and atraumatic.   Neck: Normal range of motion. Neck supple.   Cardiovascular: Normal rate and regular rhythm. Exam reveals no gallop, no distant heart sounds and no friction rub.    No murmur heard.  Pulmonary/Chest: Effort normal and breath sounds normal. No accessory muscle usage. No tachypnea. No respiratory distress. He has no decreased breath sounds. He has no wheezes. He has no rhonchi. He has no rales.   Abdominal: Soft. Normal appearance. He exhibits no distension. There is no abdominal tenderness. There is no guarding.   Musculoskeletal:      Comments: There is slight swelling to the R knee and lower leg.  TTP of the knee.  Slight increased warmth to the knee without erythema. No calf tenderness. DP pulses intact.    Pt has significant pain with ROM of the L shoulder. Only able to range to approx 35-40 degrees. Radial pulse intact. There is slight swelling to the L shoulder when compared to the L. No warmth, erythema to the joint.     Full ROM of the R knee and ankle with pain.    Neurological: He is alert.   Skin: No rash noted.         ED Course   Procedures  Labs Reviewed   CBC W/ AUTO DIFFERENTIAL - Abnormal; Notable for the following components:       Result Value    RBC 4.39 (*)     Hemoglobin 13.7 (*)     Mean Corpuscular Hemoglobin 31.2 (*)     Platelets 141 (*)     Immature Granulocytes 0.8 (*)     Immature Grans (Abs) 0.07 (*)     Mono # 1.1 (*)     All other components within normal limits   COMPREHENSIVE METABOLIC PANEL - Abnormal; Notable for the following components:    Sodium 134 (*)     All other components within normal limits    URINALYSIS, REFLEX TO URINE CULTURE - Abnormal; Notable for the following components:    Appearance, UA Hazy (*)     Protein, UA 2+ (*)     Occult Blood UA 3+ (*)     All other components within normal limits    Narrative:     Specimen Source->Urine   SEDIMENTATION RATE - Abnormal; Notable for the following components:    Sed Rate 66 (*)     All other components within normal limits   C-REACTIVE PROTEIN - Abnormal; Notable for the following components:    .6 (*)     All other components within normal limits   URINALYSIS MICROSCOPIC - Abnormal; Notable for the following components:    RBC, UA 49 (*)     All other components within normal limits    Narrative:     Specimen Source->Urine   CULTURE, BLOOD   CULTURE, BLOOD   C. TRACHOMATIS/N. GONORRHOEAE BY AMP DNA   CULTURE, BLOOD   SARS-COV-2 RNA AMPLIFICATION, QUAL   LACTIC ACID, PLASMA   HEPATITIS PANEL, ACUTE   HIV 1 / 2 ANTIBODY   RPR   TOXICOLOGY SCREEN, URINE, RANDOM (COMPLIANCE)        ECG Results          EKG 12-lead (Edited Result - FINAL)  Result time 08/19/20 15:32:50    Edited Result - FINAL by Interface, Lab In [facility] (08/19/20 15:32:50)                 Narrative:    Test Reason : R50.9,    Vent. Rate : 108 BPM     Atrial Rate : 108 BPM     P-R Int : 132 ms          QRS Dur : 084 ms      QT Int : 310 ms       P-R-T Axes : 046 101 022 degrees     QTc Int : 415 ms    Sinus tachycardia  Rightward axis  Abnormal ECG  No previous ECGs available  Reconfirmed by Robert SORTO MD ([id number]) on 8/19/2020 3:32:42 PM    Referred By: [email or web address]   SELF           Confirmed By:Robert SORTO MD                            Imaging Results          X-Ray Ankle 2 View Right (In process)                X-Ray Knee 3 View Right (In process)                X-Ray Shoulder 2 or More Views Left (Final result)  Result time 08/19/20 15:05:20    Final result by Steve Barron MD (08/19/20 15:05:20)                 Impression:      1. No acute displaced fracture or dislocation of the  left shoulder.      Electronically signed by: Steve Barron MD  Date:    08/19/2020  Time:    15:05             Narrative:    EXAMINATION:  XR SHOULDER COMPLETE 2 OR MORE VIEWS LEFT    CLINICAL HISTORY:  shoulder pain;    TECHNIQUE:  Two or three views of the left shoulder were performed.    COMPARISON:  None    FINDINGS:  Three views left shoulder.    The left humeral head maintains appropriate relationship with the glenoid.  The acromioclavicular joint is intact.  No acute displaced left rib fracture.  The left lung zones are clear.                               US Lower Extremity Veins Right (Final result)  Result time 08/19/20 14:10:45    Final result by Steve Barron MD (08/19/20 14:10:45)                 Impression:      No evidence of deep venous thrombosis in the right lower extremity.      Electronically signed by: Steve Barron MD  Date:    08/19/2020  Time:    14:10             Narrative:    EXAMINATION:  US LOWER EXTREMITY VEINS RIGHT    CLINICAL HISTORY:  Other specified soft tissue disorders    TECHNIQUE:  Duplex and color flow Doppler evaluation and graded compression of the right lower extremity veins was performed.    COMPARISON:  None    FINDINGS:  Duplex and color flow Doppler evaluation does not reveal any evidence of acute venous thrombosis in the common femoral, superficial femoral, greater saphenous, popliteal, peroneal, anterior tibial and posterior tibial veins of the right lower extremity.  There is no reflux to suggest valvular incompetence.                               X-Ray Chest AP Portable (Final result)  Result time 08/19/20 11:42:19    Final result by Wilbert Munoz MD (08/19/20 11:42:19)                 Impression:      See above      Electronically signed by: Wilbert Munoz MD  Date:    08/19/2020  Time:    11:42             Narrative:    EXAMINATION:  XR CHEST AP PORTABLE    CLINICAL HISTORY:  Sepsis;    TECHNIQUE:  Single frontal view of the chest was  performed.    COMPARISON:  None    FINDINGS:  Heart size normal.  The lungs are clear.  No pleural effusion                                 Medical Decision Making:   History:   Old Medical Records: I decided to obtain old medical records.  Differential Diagnosis:   My differential diagnosis includes but is not limited to:  Sepsis, septic arthritis, bacteremia, COVID-19 infection  Clinical Tests:   Lab Tests: Ordered  Radiological Study: Ordered  Medical Tests: Ordered  Other:   I have discussed this case with another health care provider.       <> Summary of the Discussion: Hospital Medicine       APC / Resident Notes:   24-year-old male with a history of IVDU presents to the ED with polyarthralgias and fever.  Patient is febrile to 101.4 and tachycardic to 111.  He appears unwell, but is in no acute distress.  See notable physical exam findings above.    Labs are notable for significantly elevated CRP at 164.  ESR is also elevated at 66. WBC wnl.  COVID negative.  Blood cultures pending.    Patient meets SIRS criteria.  He was started on broad-spectrum antibiotics in the ED. Concern for septic arthritis versus bacteremia.  Patient will be admitted to Hospital Medicine for further treatment and management.    I have reviewed the patient's records and discussed this case with my supervising physician. Please be advised that this text was dictated with [employer] software and may contain dictation errors.                Attending Attestation:     Physician Attestation Statement for NP/PA:   I discussed this assessment and plan of this patient with the NP/PA, but I did not personally examine the patient. The face to face encounter was performed by the NP/PA.                                Clinical Impression:       ICD-10-CM ICD-9-CM   1. SIRS (systemic inflammatory response syndrome)  R65.10 995.90   2. Fever  R50.9 780.60   3. Leg swelling  M79.89 729.81   4. Shoulder pain  M25.519 719.41   5. Polyarthralgia  M25.50  719.49   6. IVDU (intravenous drug user)  F19.90 305.90   7. Oligoarthritis of multiple sites - L shoulder, R knee, R ankle  M13.89 716.89   8. Elevated erythrocyte sedimentation rate  R70.0 790.1   9. Elevated C-reactive protein (CRP)  R79.82 790.95   10. Chest pain  R07.9 786.50   11. Sepsis  A41.9 038.9     995.91   12. Pyogenic arthritis of right knee joint, due to unspecified organism  M00.9 711.06   13. Pyogenic arthritis of left shoulder region, due to unspecified organism  M00.9 711.01   14. Septic arthritis of right ankle, due to unspecified organism  M00.9 711.07         Disposition:   Disposition: Placed in Observation  Condition: Fair     ED Disposition Condition    Observation                           Selene Canales PA-C  08/19/20 1955       Debbie Bautista MD  08/20/20 1107

## 2020-08-20 PROBLEM — F11.20 OPIOID USE DISORDER, SEVERE, DEPENDENCE: Chronic | Status: ACTIVE | Noted: 2020-08-20

## 2020-08-20 PROBLEM — F11.93 OPIOID WITHDRAWAL: Status: ACTIVE | Noted: 2020-08-20

## 2020-08-20 PROBLEM — F11.90 OPIOID USE DISORDER: Status: ACTIVE | Noted: 2020-08-20

## 2020-08-20 LAB
ALBUMIN SERPL BCP-MCNC: 3 G/DL (ref 3.5–5.2)
ALP SERPL-CCNC: 91 U/L (ref 55–135)
ALT SERPL W/O P-5'-P-CCNC: 18 U/L (ref 10–44)
AMPHET+METHAMPHET UR QL: NEGATIVE
ANION GAP SERPL CALC-SCNC: 6 MMOL/L (ref 8–16)
AST SERPL-CCNC: 16 U/L (ref 10–40)
AV INDEX (PROSTH): 1.09
AV MEAN GRADIENT: 4 MMHG
AV PEAK GRADIENT: 7 MMHG
AV VALVE AREA: 4.17 CM2
AV VELOCITY RATIO: 0.99
BARBITURATES UR QL SCN>200 NG/ML: NEGATIVE
BASOPHILS # BLD AUTO: 0.02 K/UL (ref 0–0.2)
BASOPHILS NFR BLD: 0.2 % (ref 0–1.9)
BENZODIAZ UR QL SCN>200 NG/ML: NEGATIVE
BILIRUB SERPL-MCNC: 0.6 MG/DL (ref 0.1–1)
BSA FOR ECHO PROCEDURE: 1.75 M2
BUN SERPL-MCNC: 7 MG/DL (ref 6–20)
BZE UR QL SCN: NEGATIVE
CALCIUM SERPL-MCNC: 8.7 MG/DL (ref 8.7–10.5)
CANNABINOIDS UR QL SCN: NORMAL
CHLORIDE SERPL-SCNC: 100 MMOL/L (ref 95–110)
CO2 SERPL-SCNC: 28 MMOL/L (ref 23–29)
CREAT SERPL-MCNC: 0.7 MG/DL (ref 0.5–1.4)
CREAT UR-MCNC: 52 MG/DL (ref 23–375)
CV ECHO LV RWT: 0.43 CM
DIFFERENTIAL METHOD: ABNORMAL
DOP CALC AO PEAK VEL: 1.32 M/S
DOP CALC AO VTI: 23.94 CM
DOP CALC LVOT AREA: 3.8 CM2
DOP CALC LVOT DIAMETER: 2.21 CM
DOP CALC LVOT PEAK VEL: 1.31 M/S
DOP CALC LVOT STROKE VOLUME: 99.76 CM3
DOP CALCLVOT PEAK VEL VTI: 26.02 CM
E WAVE DECELERATION TIME: 351.42 MSEC
E/A RATIO: 1.07
E/E' RATIO: 7.29 M/S
ECHO LV POSTERIOR WALL: 1.01 CM (ref 0.6–1.1)
EOSINOPHIL # BLD AUTO: 0 K/UL (ref 0–0.5)
EOSINOPHIL NFR BLD: 0.1 % (ref 0–8)
ERYTHROCYTE [DISTWIDTH] IN BLOOD BY AUTOMATED COUNT: 12.5 % (ref 11.5–14.5)
EST. GFR  (AFRICAN AMERICAN): >60 ML/MIN/1.73 M^2
EST. GFR  (NON AFRICAN AMERICAN): >60 ML/MIN/1.73 M^2
ETHANOL UR-MCNC: <10 MG/DL
FRACTIONAL SHORTENING: 28 % (ref 28–44)
GLUCOSE SERPL-MCNC: 216 MG/DL (ref 70–110)
HAV IGM SERPL QL IA: NEGATIVE
HBV CORE IGM SERPL QL IA: NEGATIVE
HBV SURFACE AG SERPL QL IA: NEGATIVE
HCT VFR BLD AUTO: 34.7 % (ref 40–54)
HCV AB SERPL QL IA: POSITIVE
HGB BLD-MCNC: 11.7 G/DL (ref 14–18)
HIV 1+2 AB+HIV1 P24 AG SERPL QL IA: NEGATIVE
IMM GRANULOCYTES # BLD AUTO: 0.11 K/UL (ref 0–0.04)
IMM GRANULOCYTES NFR BLD AUTO: 1 % (ref 0–0.5)
INTERVENTRICULAR SEPTUM: 0.85 CM (ref 0.6–1.1)
LA MAJOR: 4.46 CM
LA MINOR: 4.99 CM
LA WIDTH: 2.42 CM
LEFT ATRIUM SIZE: 2.51 CM
LEFT ATRIUM VOLUME INDEX: 13.8 ML/M2
LEFT ATRIUM VOLUME: 24.32 CM3
LEFT INTERNAL DIMENSION IN SYSTOLE: 3.38 CM (ref 2.1–4)
LEFT VENTRICLE DIASTOLIC VOLUME INDEX: 58.65 ML/M2
LEFT VENTRICLE DIASTOLIC VOLUME: 103.01 ML
LEFT VENTRICLE MASS INDEX: 85 G/M2
LEFT VENTRICLE SYSTOLIC VOLUME INDEX: 26.5 ML/M2
LEFT VENTRICLE SYSTOLIC VOLUME: 46.63 ML
LEFT VENTRICULAR INTERNAL DIMENSION IN DIASTOLE: 4.71 CM (ref 3.5–6)
LEFT VENTRICULAR MASS: 149.62 G
LV LATERAL E/E' RATIO: 8.69 M/S
LV SEPTAL E/E' RATIO: 6.28 M/S
LYMPHOCYTES # BLD AUTO: 1.4 K/UL (ref 1–4.8)
LYMPHOCYTES NFR BLD: 12.5 % (ref 18–48)
MAGNESIUM SERPL-MCNC: 2.1 MG/DL (ref 1.6–2.6)
MCH RBC QN AUTO: 31 PG (ref 27–31)
MCHC RBC AUTO-ENTMCNC: 33.7 G/DL (ref 32–36)
MCV RBC AUTO: 92 FL (ref 82–98)
METHADONE UR QL SCN>300 NG/ML: NEGATIVE
MONOCYTES # BLD AUTO: 0.6 K/UL (ref 0.3–1)
MONOCYTES NFR BLD: 5.1 % (ref 4–15)
MV PEAK A VEL: 1.06 M/S
MV PEAK E VEL: 1.13 M/S
MV STENOSIS PRESSURE HALF TIME: 101.91 MS
MV VALVE AREA P 1/2 METHOD: 2.16 CM2
NEUTROPHILS # BLD AUTO: 9 K/UL (ref 1.8–7.7)
NEUTROPHILS NFR BLD: 81.1 % (ref 38–73)
NRBC BLD-RTO: 0 /100 WBC
OPIATES UR QL SCN: NORMAL
PATH INTERP FLD-IMP: NORMAL
PATH INTERP FLD-IMP: NORMAL
PCP UR QL SCN>25 NG/ML: NEGATIVE
PHOSPHATE SERPL-MCNC: 3 MG/DL (ref 2.7–4.5)
PISA TR MAX VEL: 1.93 M/S
PLATELET # BLD AUTO: 154 K/UL (ref 150–350)
PMV BLD AUTO: 11.6 FL (ref 9.2–12.9)
POTASSIUM SERPL-SCNC: 4.1 MMOL/L (ref 3.5–5.1)
PROT SERPL-MCNC: 6.9 G/DL (ref 6–8.4)
PULM VEIN S/D RATIO: 1.35
PV PEAK D VEL: 0.49 M/S
PV PEAK S VEL: 0.66 M/S
RA MAJOR: 4.68 CM
RA PRESSURE: 3 MMHG
RBC # BLD AUTO: 3.77 M/UL (ref 4.6–6.2)
RIGHT VENTRICULAR END-DIASTOLIC DIMENSION: 3.75 CM
RPR SER QL: NORMAL
RV TISSUE DOPPLER FREE WALL SYSTOLIC VELOCITY 1 (APICAL 4 CHAMBER VIEW): 13.65 CM/S
SINUS: 3.4 CM
SODIUM SERPL-SCNC: 134 MMOL/L (ref 136–145)
TDI LATERAL: 0.13 M/S
TDI SEPTAL: 0.18 M/S
TDI: 0.16 M/S
TOXICOLOGY INFORMATION: NORMAL
TR MAX PG: 15 MMHG
TRICUSPID ANNULAR PLANE SYSTOLIC EXCURSION: 2.53 CM
TV REST PULMONARY ARTERY PRESSURE: 18 MMHG
WBC # BLD AUTO: 11.07 K/UL (ref 3.9–12.7)

## 2020-08-20 PROCEDURE — 25000003 PHARM REV CODE 250: Performed by: STUDENT IN AN ORGANIZED HEALTH CARE EDUCATION/TRAINING PROGRAM

## 2020-08-20 PROCEDURE — 99233 SBSQ HOSP IP/OBS HIGH 50: CPT | Mod: ,,, | Performed by: PHYSICIAN ASSISTANT

## 2020-08-20 PROCEDURE — 99233 PR SUBSEQUENT HOSPITAL CARE,LEVL III: ICD-10-PCS | Mod: ,,, | Performed by: PSYCHIATRY & NEUROLOGY

## 2020-08-20 PROCEDURE — 63600175 PHARM REV CODE 636 W HCPCS: Performed by: INTERNAL MEDICINE

## 2020-08-20 PROCEDURE — 99223 1ST HOSP IP/OBS HIGH 75: CPT | Mod: ,,, | Performed by: INTERNAL MEDICINE

## 2020-08-20 PROCEDURE — 99233 SBSQ HOSP IP/OBS HIGH 50: CPT | Mod: ,,, | Performed by: PSYCHIATRY & NEUROLOGY

## 2020-08-20 PROCEDURE — 99233 PR SUBSEQUENT HOSPITAL CARE,LEVL III: ICD-10-PCS | Mod: ,,, | Performed by: PHYSICIAN ASSISTANT

## 2020-08-20 PROCEDURE — 25000003 PHARM REV CODE 250: Performed by: REGISTERED NURSE

## 2020-08-20 PROCEDURE — 63600175 PHARM REV CODE 636 W HCPCS: Performed by: STUDENT IN AN ORGANIZED HEALTH CARE EDUCATION/TRAINING PROGRAM

## 2020-08-20 PROCEDURE — 96376 TX/PRO/DX INJ SAME DRUG ADON: CPT

## 2020-08-20 PROCEDURE — 80307 DRUG TEST PRSMV CHEM ANLYZR: CPT

## 2020-08-20 PROCEDURE — 11000001 HC ACUTE MED/SURG PRIVATE ROOM

## 2020-08-20 PROCEDURE — 85025 COMPLETE CBC W/AUTO DIFF WBC: CPT

## 2020-08-20 PROCEDURE — 63600175 PHARM REV CODE 636 W HCPCS: Performed by: REGISTERED NURSE

## 2020-08-20 PROCEDURE — 83735 ASSAY OF MAGNESIUM: CPT

## 2020-08-20 PROCEDURE — 36415 COLL VENOUS BLD VENIPUNCTURE: CPT

## 2020-08-20 PROCEDURE — 84100 ASSAY OF PHOSPHORUS: CPT

## 2020-08-20 PROCEDURE — 99232 SBSQ HOSP IP/OBS MODERATE 35: CPT | Mod: ,,, | Performed by: INTERNAL MEDICINE

## 2020-08-20 PROCEDURE — 25000003 PHARM REV CODE 250: Performed by: INTERNAL MEDICINE

## 2020-08-20 PROCEDURE — 99223 PR INITIAL HOSPITAL CARE,LEVL III: ICD-10-PCS | Mod: ,,, | Performed by: INTERNAL MEDICINE

## 2020-08-20 PROCEDURE — 80053 COMPREHEN METABOLIC PANEL: CPT

## 2020-08-20 PROCEDURE — 99232 PR SUBSEQUENT HOSPITAL CARE,LEVL II: ICD-10-PCS | Mod: ,,, | Performed by: INTERNAL MEDICINE

## 2020-08-20 RX ORDER — OXYCODONE HYDROCHLORIDE 10 MG/1
10 TABLET ORAL EVERY 4 HOURS PRN
Status: DISCONTINUED | OUTPATIENT
Start: 2020-08-20 | End: 2020-08-31

## 2020-08-20 RX ORDER — LORAZEPAM 0.5 MG/1
0.5 TABLET ORAL EVERY 6 HOURS PRN
Status: DISCONTINUED | OUTPATIENT
Start: 2020-08-20 | End: 2020-08-28

## 2020-08-20 RX ORDER — VANCOMYCIN HCL IN 5 % DEXTROSE 1G/250ML
1000 PLASTIC BAG, INJECTION (ML) INTRAVENOUS
Status: DISCONTINUED | OUTPATIENT
Start: 2020-08-20 | End: 2020-08-21

## 2020-08-20 RX ORDER — ACETAMINOPHEN 325 MG/1
650 TABLET ORAL 3 TIMES DAILY
Status: DISCONTINUED | OUTPATIENT
Start: 2020-08-20 | End: 2020-09-01 | Stop reason: HOSPADM

## 2020-08-20 RX ORDER — FLUOXETINE 10 MG/1
10 CAPSULE ORAL DAILY
Status: DISCONTINUED | OUTPATIENT
Start: 2020-08-20 | End: 2020-08-23

## 2020-08-20 RX ORDER — HYDROMORPHONE HYDROCHLORIDE 1 MG/ML
0.2 INJECTION, SOLUTION INTRAMUSCULAR; INTRAVENOUS; SUBCUTANEOUS EVERY 5 MIN PRN
Status: DISCONTINUED | OUTPATIENT
Start: 2020-08-20 | End: 2020-08-20 | Stop reason: HOSPADM

## 2020-08-20 RX ORDER — OXYCODONE HYDROCHLORIDE 10 MG/1
10 TABLET ORAL EVERY 6 HOURS PRN
Status: DISCONTINUED | OUTPATIENT
Start: 2020-08-20 | End: 2020-08-20

## 2020-08-20 RX ORDER — ASPIRIN 81 MG/1
81 TABLET ORAL DAILY
Status: DISCONTINUED | OUTPATIENT
Start: 2020-08-20 | End: 2020-09-01 | Stop reason: HOSPADM

## 2020-08-20 RX ORDER — ACETAMINOPHEN 10 MG/ML
INJECTION, SOLUTION INTRAVENOUS
Status: DISCONTINUED | OUTPATIENT
Start: 2020-08-20 | End: 2020-08-20

## 2020-08-20 RX ORDER — CLONIDINE HYDROCHLORIDE 0.1 MG/1
0.1 TABLET ORAL EVERY 4 HOURS PRN
Status: DISCONTINUED | OUTPATIENT
Start: 2020-08-20 | End: 2020-09-01 | Stop reason: HOSPADM

## 2020-08-20 RX ORDER — DEXMEDETOMIDINE HYDROCHLORIDE 100 UG/ML
INJECTION, SOLUTION INTRAVENOUS
Status: DISCONTINUED | OUTPATIENT
Start: 2020-08-20 | End: 2020-08-20

## 2020-08-20 RX ORDER — HYDROMORPHONE HYDROCHLORIDE 1 MG/ML
1 INJECTION, SOLUTION INTRAMUSCULAR; INTRAVENOUS; SUBCUTANEOUS EVERY 6 HOURS PRN
Status: DISCONTINUED | OUTPATIENT
Start: 2020-08-20 | End: 2020-08-21

## 2020-08-20 RX ORDER — METHOCARBAMOL 500 MG/1
500 TABLET, FILM COATED ORAL EVERY 6 HOURS PRN
Status: DISCONTINUED | OUTPATIENT
Start: 2020-08-20 | End: 2020-08-21

## 2020-08-20 RX ORDER — DICYCLOMINE HYDROCHLORIDE 10 MG/1
10 CAPSULE ORAL EVERY 6 HOURS PRN
Status: DISCONTINUED | OUTPATIENT
Start: 2020-08-20 | End: 2020-09-01 | Stop reason: HOSPADM

## 2020-08-20 RX ORDER — LOPERAMIDE HYDROCHLORIDE 2 MG/1
2 CAPSULE ORAL EVERY 6 HOURS PRN
Status: DISCONTINUED | OUTPATIENT
Start: 2020-08-20 | End: 2020-09-01 | Stop reason: HOSPADM

## 2020-08-20 RX ORDER — LORAZEPAM 0.5 MG/1
0.5 TABLET ORAL EVERY 6 HOURS PRN
Status: DISCONTINUED | OUTPATIENT
Start: 2020-08-20 | End: 2020-08-20

## 2020-08-20 RX ORDER — ARIPIPRAZOLE 5 MG/1
5 TABLET ORAL DAILY
Status: DISCONTINUED | OUTPATIENT
Start: 2020-08-20 | End: 2020-09-01 | Stop reason: HOSPADM

## 2020-08-20 RX ORDER — HYDROMORPHONE HYDROCHLORIDE 1 MG/ML
INJECTION, SOLUTION INTRAMUSCULAR; INTRAVENOUS; SUBCUTANEOUS
Status: DISPENSED
Start: 2020-08-20 | End: 2020-08-20

## 2020-08-20 RX ORDER — HYDROXYZINE PAMOATE 25 MG/1
50 CAPSULE ORAL NIGHTLY PRN
Status: DISCONTINUED | OUTPATIENT
Start: 2020-08-20 | End: 2020-08-28

## 2020-08-20 RX ORDER — HYDROMORPHONE HYDROCHLORIDE 2 MG/ML
INJECTION, SOLUTION INTRAMUSCULAR; INTRAVENOUS; SUBCUTANEOUS
Status: DISCONTINUED | OUTPATIENT
Start: 2020-08-20 | End: 2020-08-20

## 2020-08-20 RX ADMIN — HYDROMORPHONE HYDROCHLORIDE 0.5 MG: 2 INJECTION, SOLUTION INTRAMUSCULAR; INTRAVENOUS; SUBCUTANEOUS at 12:08

## 2020-08-20 RX ADMIN — OXYCODONE HYDROCHLORIDE 10 MG: 10 TABLET ORAL at 09:08

## 2020-08-20 RX ADMIN — DIAZEPAM 5 MG: 5 TABLET ORAL at 01:08

## 2020-08-20 RX ADMIN — COLCHICINE 0.6 MG: 0.6 TABLET, FILM COATED ORAL at 09:08

## 2020-08-20 RX ADMIN — VANCOMYCIN HYDROCHLORIDE 1000 MG: 1 INJECTION, POWDER, LYOPHILIZED, FOR SOLUTION INTRAVENOUS at 02:08

## 2020-08-20 RX ADMIN — ARIPIPRAZOLE 5 MG: 5 TABLET ORAL at 02:08

## 2020-08-20 RX ADMIN — ACETAMINOPHEN 650 MG: 325 TABLET ORAL at 02:08

## 2020-08-20 RX ADMIN — FLUOXETINE 10 MG: 10 CAPSULE ORAL at 02:08

## 2020-08-20 RX ADMIN — VANCOMYCIN HYDROCHLORIDE 1000 MG: 1 INJECTION, POWDER, LYOPHILIZED, FOR SOLUTION INTRAVENOUS at 03:08

## 2020-08-20 RX ADMIN — DICYCLOMINE HYDROCHLORIDE 10 MG: 10 CAPSULE ORAL at 09:08

## 2020-08-20 RX ADMIN — HYDROMORPHONE HYDROCHLORIDE 1 MG: 1 INJECTION, SOLUTION INTRAMUSCULAR; INTRAVENOUS; SUBCUTANEOUS at 04:08

## 2020-08-20 RX ADMIN — HYDROMORPHONE HYDROCHLORIDE 1 MG: 1 INJECTION, SOLUTION INTRAMUSCULAR; INTRAVENOUS; SUBCUTANEOUS at 10:08

## 2020-08-20 RX ADMIN — ASPIRIN 81 MG: 81 TABLET, COATED ORAL at 09:08

## 2020-08-20 RX ADMIN — ACETAMINOPHEN 650 MG: 325 TABLET ORAL at 12:08

## 2020-08-20 RX ADMIN — CEFTRIAXONE 2 G: 2 INJECTION, SOLUTION INTRAVENOUS at 09:08

## 2020-08-20 RX ADMIN — FENTANYL CITRATE 50 MCG: 50 INJECTION, SOLUTION INTRAMUSCULAR; INTRAVENOUS at 12:08

## 2020-08-20 RX ADMIN — DEXMEDETOMIDINE HYDROCHLORIDE 8 MCG: 100 INJECTION, SOLUTION, CONCENTRATE INTRAVENOUS at 12:08

## 2020-08-20 RX ADMIN — DIAZEPAM 5 MG: 5 TABLET ORAL at 12:08

## 2020-08-20 RX ADMIN — ACETAMINOPHEN 650 MG: 325 TABLET ORAL at 08:08

## 2020-08-20 RX ADMIN — OXYCODONE 5 MG: 5 TABLET ORAL at 02:08

## 2020-08-20 RX ADMIN — OXYCODONE 5 MG: 5 TABLET ORAL at 07:08

## 2020-08-20 RX ADMIN — MELATONIN TAB 3 MG 6 MG: 3 TAB at 01:08

## 2020-08-20 RX ADMIN — ACETAMINOPHEN 1000 MG: 10 INJECTION, SOLUTION INTRAVENOUS at 12:08

## 2020-08-20 RX ADMIN — PANTOPRAZOLE SODIUM 40 MG: 40 TABLET, DELAYED RELEASE ORAL at 09:08

## 2020-08-20 RX ADMIN — INDOMETHACIN 50 MG: 50 CAPSULE ORAL at 10:08

## 2020-08-20 NOTE — SUBJECTIVE & OBJECTIVE
Past Medical History:   Diagnosis Date    IVDU (intravenous drug user) 8/19/2020       Past Surgical History:   Procedure Laterality Date    ARTHROSCOPY OF KNEE Right 8/19/2020    Procedure: ARTHROSCOPY, KNEE - cultures & 9 liters saline - arthroscopic leg lozada ;  Surgeon: Kamran Levy MD;  Location: [address];  Service: Orthopedics;  Laterality: Right;    ARTHROTOMY OF ANKLE Right 8/19/2020    Procedure: ARTHROTOMY, ANKLE - cultures & 9 liters of saline - cysto tubing;  Surgeon: Kamran Levy MD;  Location: [address];  Service: Orthopedics;  Laterality: Right;    SHOULDER ARTHROSCOPY Left 8/19/2020    Procedure: ARTHROSCOPY, SHOULDER - cultures & 9 liters saline;  Surgeon: Kamran Levy MD;  Location: [address];  Service: Orthopedics;  Laterality: Left;       Review of patient's allergies indicates:   Allergen Reactions    Penicillins Hives     Unclear of last episode of allergic reaction       Medications:  Medications Prior to Admission   Medication Sig    ARIPiprazole (ABILIFY) 10 MG Tab Take 5 mg by mouth once daily.    buprenorphine-naloxone (SUBOXONE) 8-2 mg Film Place under the tongue.    FLUoxetine 10 MG capsule Take 10 mg by mouth once daily.     Antibiotics (From admission, onward)    Start     Stop Route Frequency Ordered    08/20/20 0800  cefTRIAXone (ROCEPHIN) 2 g/50 mL D5W IVPB      -- IV Every 24 hours (non-standard times) 08/20/20 0001    08/20/20 0130  vancomycin in dextrose 5 % 1 gram/250 mL IVPB 1,000 mg      -- IV Every 12 hours (non-standard times) 08/20/20 0017        Antifungals (From admission, onward)    None        Antivirals (From admission, onward)    None             There is no immunization history on file for this patient.    Family History     None        Social History     Socioeconomic History    Marital status: Single     Spouse name: Not on file    Number of children: Not on file    Years of education: Not on file    Highest education  level: Not on file   Occupational History    Not on file   Social Needs    Financial resource strain: Not on file    Food insecurity     Worry: Not on file     Inability: Not on file    Transportation needs     Medical: Not on file     Non-medical: Not on file   Tobacco Use    Smoking status: Current Every Day Smoker     Packs/day: 1.00     Years: 5.00     Pack years: 5.00     Types: Cigarettes    Smokeless tobacco: Never Used   Substance and Sexual Activity    Alcohol use: Not Currently    Drug use: Yes     Types: Opium    Sexual activity: Yes     Partners: Female     Birth control/protection: None   Lifestyle    Physical activity     Days per week: Not on file     Minutes per session: Not on file    Stress: Not on file   Relationships    Social connections     Talks on phone: Not on file     Gets together: Not on file     Attends Rastafari service: Not on file     Active member of club or organization: Not on file     Attends meetings of clubs or organizations: Not on file     Relationship status: Not on file   Other Topics Concern    Not on file   Social History Narrative    Not on file     Review of Systems   Constitutional: Positive for activity change, appetite change, chills, diaphoresis, fatigue and fever.   Respiratory: Negative for cough and shortness of breath.    Gastrointestinal: Negative for abdominal pain, nausea and vomiting.   Genitourinary: Negative for dysuria.   Musculoskeletal: Positive for arthralgias and joint swelling. Negative for back pain.   Skin: Negative for color change, pallor, rash and wound.   Neurological: Negative for dizziness and headaches.   All other systems reviewed and are negative.    Objective:     Vital Signs (Most Recent):  Temp: 97.2 °F (36.2 °C) (08/20/20 1233)  Pulse: 63 (08/20/20 1233)  Resp: 20 (08/20/20 1233)  BP: 118/66 (08/20/20 1233)  SpO2: 97 % (08/20/20 1233) Vital Signs (24h Range):  Temp:  [97 °F (36.1 °C)-99.6 °F (37.6 °C)] 97.2 °F (36.2  °C)  Pulse:  [] 63  Resp:  [16-20] 20  SpO2:  [95 %-99 %] 97 %  BP: (108-127)/(60-73) 118/66     Weight: 63.5 kg (140 lb)  Body mass index is 21.29 kg/m².    Estimated Creatinine Clearance: 147.4 mL/min (based on SCr of 0.7 mg/dL).    Physical Exam  Vitals signs and nursing note reviewed.   Constitutional:       General: He is not in acute distress.     Appearance: He is not ill-appearing, toxic-appearing or diaphoretic.      Comments: thin   HENT:      Head: Normocephalic.   Cardiovascular:      Rate and Rhythm: Regular rhythm. Tachycardia present.      Heart sounds: No murmur. No friction rub. No gallop.    Pulmonary:      Effort: Pulmonary effort is normal. No respiratory distress.      Breath sounds: Normal breath sounds. No stridor.   Abdominal:      General: Abdomen is flat. There is no distension.      Palpations: Abdomen is soft. There is no mass.   Musculoskeletal:         General: Swelling and tenderness present. No deformity or signs of injury.   Skin:     General: Skin is warm and dry.      Coloration: Skin is not jaundiced or pale.   Neurological:      General: No focal deficit present.      Mental Status: He is oriented to person, place, and time.   Psychiatric:      Comments: tearful         Significant Labs: All pertinent labs within the past 24 hours have been reviewed.    Significant Imaging: I have reviewed all pertinent imaging results/findings within the past 24 hours.

## 2020-08-20 NOTE — CONSULTS
[facility]  Infectious Disease  Consult Note    Patient Name: Bebo Koroma  MRN: [id number]  Admission Date: 8/19/2020  Hospital Length of Stay: 0 days  Attending Physician: Ronna Greene MD  Primary Care Provider: Primary Doctor No     Isolation Status: No active isolations      Inpatient consult to Infectious Diseases  Consult performed by: Sher Alfaro PA-C  Consult ordered by: Glenn Driscoll MD    Inpatient consult to Infectious Diseases  Consult performed by: Sher Alfaro PA-C  Consult ordered by: Elliott Dickens MD        Assessment/Plan:     Septic arthritis of knee, right  23 y/o male with IVDU presents with polyarthritis and fevers at home. He developed right ankle, right knee and left shoulder pain 3 days ago with associated fever, chills, fatigue, and night sweats. Denies n/v and chest pain. He last IV drug use was 3 days ago and inject to left arm.     tmax 101.4 in ED with elevated inflammatory markers. started on vancomycin and zosyn in ED. underwent aspiration of multiple joints yesterday (R knee 31K cells 90% segs, right ankle 600 WBC 43%segs, left shoulder no data)  He underwent washout with orthopedics yesterday. Per op note, murky fluid was encountered from joint spaces. Cultures pending. infectious workup pending. Blood cultures NGTD.    Recommendations  1. Continue vancomycin and ceftriaxone  2. Will follow cultures and tailor abx accordingly  3. Likely need long term IV abx therapy.   4. ID will follow with you           Thank you for your consult. I will follow-up with patient. Please contact us if you have any additional questions.    Sher Alfaro PA-C  Infectious Disease  [facility]    Subjective:     Principal Problem: SIRS (systemic inflammatory response syndrome)    HPI: 23 y/o male with IVDU presents with polyarthritis and fevers at home. He developed right ankle, right knee and left shoulder pain 3 days ago with associated fever,  chills, fatigue, and night sweats. Denies n/v/ and chest pain. He last IV drug use was 3 days ago and inject to left arm.     tmax 101.4 in ED with elevated inflammatory markers. started on vancomycin and zosyn in ED. He underwent washout with orthopedics yesterday.     R Knee Joint Fluid Analysis:  WBC: 31k cells  Segs: 90%  Crystals: negative  Gram Stain: negative  Cultures pending     R Ankle Joint Fluid Analysis:  WBC: 600 cells  Segs: 43%  Crystals: negative  Gram Stain: negative  Cultures pending     L Shoulder Joint Fluid Analysis:  WBC: clotted  Segs: clotted  Crystals: negative  Gram Stain: negative  Cultures pending    Per op note, murky fluid was encountered from joint spaces. Cultures pending.     Past Medical History:   Diagnosis Date    IVDU (intravenous drug user) 8/19/2020       Past Surgical History:   Procedure Laterality Date    ARTHROSCOPY OF KNEE Right 8/19/2020    Procedure: ARTHROSCOPY, KNEE - cultures & 9 liters saline - arthroscopic leg lozada ;  Surgeon: Kamran Levy MD;  Location: [address];  Service: Orthopedics;  Laterality: Right;    ARTHROTOMY OF ANKLE Right 8/19/2020    Procedure: ARTHROTOMY, ANKLE - cultures & 9 liters of saline - cysto tubing;  Surgeon: Kamran Levy MD;  Location: [address];  Service: Orthopedics;  Laterality: Right;    SHOULDER ARTHROSCOPY Left 8/19/2020    Procedure: ARTHROSCOPY, SHOULDER - cultures & 9 liters saline;  Surgeon: Kamran Levy MD;  Location: [address];  Service: Orthopedics;  Laterality: Left;       Review of patient's allergies indicates:   Allergen Reactions    Penicillins Hives     Unclear of last episode of allergic reaction       Medications:  Medications Prior to Admission   Medication Sig    ARIPiprazole (ABILIFY) 10 MG Tab Take 5 mg by mouth once daily.    buprenorphine-naloxone (SUBOXONE) 8-2 mg Film Place under the tongue.    FLUoxetine 10 MG capsule Take 10 mg by mouth once daily.     Antibiotics  (From admission, onward)    Start     Stop Route Frequency Ordered    08/20/20 0800  cefTRIAXone (ROCEPHIN) 2 g/50 mL D5W IVPB      -- IV Every 24 hours (non-standard times) 08/20/20 0001    08/20/20 0130  vancomycin in dextrose 5 % 1 gram/250 mL IVPB 1,000 mg      -- IV Every 12 hours (non-standard times) 08/20/20 0017        Antifungals (From admission, onward)    None        Antivirals (From admission, onward)    None             There is no immunization history on file for this patient.    Family History     None        Social History     Socioeconomic History    Marital status: Single     Spouse name: Not on file    Number of children: Not on file    Years of education: Not on file    Highest education level: Not on file   Occupational History    Not on file   Social Needs    Financial resource strain: Not on file    Food insecurity     Worry: Not on file     Inability: Not on file    Transportation needs     Medical: Not on file     Non-medical: Not on file   Tobacco Use    Smoking status: Current Every Day Smoker     Packs/day: 1.00     Years: 5.00     Pack years: 5.00     Types: Cigarettes    Smokeless tobacco: Never Used   Substance and Sexual Activity    Alcohol use: Not Currently    Drug use: Yes     Types: Opium    Sexual activity: Yes     Partners: Female     Birth control/protection: None   Lifestyle    Physical activity     Days per week: Not on file     Minutes per session: Not on file    Stress: Not on file   Relationships    Social connections     Talks on phone: Not on file     Gets together: Not on file     Attends Mu-ism service: Not on file     Active member of club or organization: Not on file     Attends meetings of clubs or organizations: Not on file     Relationship status: Not on file   Other Topics Concern    Not on file   Social History Narrative    Not on file     Review of Systems   Constitutional: Positive for activity change, appetite change, chills, diaphoresis,  fatigue and fever.   Respiratory: Negative for cough and shortness of breath.    Gastrointestinal: Negative for abdominal pain, nausea and vomiting.   Genitourinary: Negative for dysuria.   Musculoskeletal: Positive for arthralgias and joint swelling. Negative for back pain.   Skin: Negative for color change, pallor, rash and wound.   Neurological: Negative for dizziness and headaches.   All other systems reviewed and are negative.    Objective:     Vital Signs (Most Recent):  Temp: 97.2 °F (36.2 °C) (08/20/20 1233)  Pulse: 63 (08/20/20 1233)  Resp: 20 (08/20/20 1233)  BP: 118/66 (08/20/20 1233)  SpO2: 97 % (08/20/20 1233) Vital Signs (24h Range):  Temp:  [97 °F (36.1 °C)-99.6 °F (37.6 °C)] 97.2 °F (36.2 °C)  Pulse:  [] 63  Resp:  [16-20] 20  SpO2:  [95 %-99 %] 97 %  BP: (108-127)/(60-73) 118/66     Weight: 63.5 kg (140 lb)  Body mass index is 21.29 kg/m².    Estimated Creatinine Clearance: 147.4 mL/min (based on SCr of 0.7 mg/dL).    Physical Exam  Vitals signs and nursing note reviewed.   Constitutional:       General: He is not in acute distress.     Appearance: He is not ill-appearing, toxic-appearing or diaphoretic.      Comments: thin   HENT:      Head: Normocephalic.   Cardiovascular:      Rate and Rhythm: Regular rhythm. Tachycardia present.      Heart sounds: No murmur. No friction rub. No gallop.    Pulmonary:      Effort: Pulmonary effort is normal. No respiratory distress.      Breath sounds: Normal breath sounds. No stridor.   Abdominal:      General: Abdomen is flat. There is no distension.      Palpations: Abdomen is soft. There is no mass.   Musculoskeletal:         General: Swelling and tenderness present. No deformity or signs of injury.   Skin:     General: Skin is warm and dry.      Coloration: Skin is not jaundiced or pale.   Neurological:      General: No focal deficit present.      Mental Status: He is oriented to person, place, and time.   Psychiatric:      Comments: tearful          Significant Labs: All pertinent labs within the past 24 hours have been reviewed.    Significant Imaging: I have reviewed all pertinent imaging results/findings within the past 24 hours.

## 2020-08-20 NOTE — PROGRESS NOTES
[facility]  Orthopedics  Progress Note    Patient Name: Bebo Koroma  MRN: [id number]  Admission Date: 8/19/2020  Hospital Length of Stay: 0 days  Attending Provider: Ronna Greene MD  Primary Care Provider: Primary Doctor No  Follow-up For: Procedure(s) (LRB):  ARTHROSCOPY, KNEE - cultures & 9 liters saline - arthroscopic leg lozada  (Right)  ARTHROSCOPY, SHOULDER - cultures & 9 liters saline (Left)  ARTHROTOMY, ANKLE - cultures & 9 liters of saline - cysto tubing (Right)    Post-Operative Day: 1 Day Post-Op  Subjective:     Principal Problem:SIRS (systemic inflammatory response syndrome)    Principal Orthopedic Problem: s/p L shoulder arthroscopy, R knee arthroscopy and R ankle arthrotomy on 8/19/20    Interval History: The patient was seen and examined at the bedside. NAEON. Tolerating PO intake. VSS. Pain improved in L shoulder, R knee and R ankle. Still sore with ROM of all affected joints. Ngtd cultures.      Review of patient's allergies indicates:   Allergen Reactions    Penicillins Hives     Unclear of last episode of allergic reaction       Current Facility-Administered Medications   Medication    acetaminophen tablet 650 mg    ARIPiprazole tablet 5 mg    aspirin EC tablet 81 mg    cefTRIAXone (ROCEPHIN) 2 g/50 mL D5W IVPB    cloNIDine tablet 0.1 mg    dextrose 50% injection 12.5 g    dextrose 50% injection 25 g    dicyclomine capsule 10 mg    FLUoxetine capsule 10 mg    glucagon (human recombinant) injection 1 mg    glucose chewable tablet 16 g    glucose chewable tablet 24 g    HYDROmorphone injection 1 mg    hydrOXYzine pamoate capsule 50 mg    loperamide capsule 2 mg    LORazepam tablet 0.5 mg    melatonin tablet 6 mg    methocarbamoL tablet 500 mg    ondansetron disintegrating tablet 8 mg    oxyCODONE immediate release tablet Tab 10 mg    pantoprazole EC tablet 40 mg    prochlorperazine tablet 10 mg    senna-docusate 8.6-50 mg per tablet 1 tablet     "sodium chloride 0.9% flush 10 mL    vancomycin in dextrose 5 % 1 gram/250 mL IVPB 1,000 mg     Objective:     Vital Signs (Most Recent):  Temp: 96.5 °F (35.8 °C) (08/20/20 1530)  Pulse: 74 (08/20/20 1530)  Resp: 16 (08/20/20 1646)  BP: 118/66 (08/20/20 1233)  SpO2: 99 % (08/20/20 1530) Vital Signs (24h Range):  Temp:  [96.5 °F (35.8 °C)-98.4 °F (36.9 °C)] 96.5 °F (35.8 °C)  Pulse:  [] 74  Resp:  [16-20] 16  SpO2:  [97 %-99 %] 99 %  BP: (115-127)/(60-66) 118/66     Weight: 63.5 kg (140 lb)  Height: 5' 8" (172.7 cm)  Body mass index is 21.29 kg/m².      Intake/Output Summary (Last 24 hours) at 8/20/2020 1819  Last data filed at 8/20/2020 0532  Gross per 24 hour   Intake 1655 ml   Output 1500 ml   Net 155 ml       Ortho/SPM Exam     LUE  Dressings c,d,i  Mild soreness with passive shoulder ROM  SILT M/U/R  Motor intact AIN/PIN/M/U/R   Cap refill < 2s  2+ RP    RLE:  Dressings to ankle and knee c,d,i  Soreness with ankle and knee passive  SILT Sa/Bell/DP/SP/T  Motor intact EHL/FHL/TA/Gastroc  2+ DP, 2+ PT          Significant Labs:   CBC:   Recent Labs   Lab 08/19/20  1140 08/20/20  0454   WBC 9.05 11.07   HGB 13.7* 11.7*   HCT 40.2 34.7*   * 154     CMP:   Recent Labs   Lab 08/19/20  1140 08/20/20  0454   * 134*   K 3.9 4.1   CL 97 100   CO2 27 28   GLU 92 216*   BUN 9 7   CREATININE 0.8 0.7   CALCIUM 9.5 8.7   PROT 7.9 6.9   ALBUMIN 3.6 3.0*   BILITOT 0.9 0.6   ALKPHOS 110 91   AST 20 16   ALT 24 18   ANIONGAP 10 6*   EGFRNONAA >60.0 >60.0     CRP:   Recent Labs   Lab 08/19/20  1140   .6*     All pertinent labs within the past 24 hours have been reviewed.    Significant Imaging: I have reviewed all pertinent imaging results/findings.    Assessment/Plan:     Septic arthritis of knee, right  Bebo Koroma is a 24 y.o. male IVDU presenting with fevers, left shoulder pain, right knee pain, right ankle pain s/p L shoulder arthroscopy, R knee arthroscopy and R ankle arthrotomy on 8/19/20. Pain has " improved significantly since yesterday, but having some soreness with ROM.    -Trend ESR and CRP daily  -Follow cultures, ngtd  -PT/OT WBAT in all joints  -Abx, continue vanc/rocephin;  ID recs once cx and sensitivities  -DVT asa 81 daily per medicine team                Tian Bucio MD  Orthopedics  [facility]

## 2020-08-20 NOTE — SUBJECTIVE & OBJECTIVE
Interval History: POD 1. Pt crying in pain upon exam this AM. He reports chills and is sweating. Likely in withdrawal with post-op pain.    Review of Systems   Constitutional: Positive for appetite change, chills and diaphoresis. Negative for fever.   HENT: Negative for congestion, sore throat and trouble swallowing.    Eyes: Negative for photophobia, pain and discharge.   Respiratory: Negative for cough and shortness of breath.    Cardiovascular: Negative for chest pain and palpitations.   Gastrointestinal: Negative for abdominal pain, diarrhea, nausea and vomiting.   Genitourinary: Negative for difficulty urinating.   Musculoskeletal: Positive for arthralgias and joint swelling. Negative for back pain, myalgias and neck pain.   Skin: Negative for pallor and rash.   Neurological: Negative for light-headedness, numbness and headaches.     Objective:     Vital Signs (Most Recent):  Temp: 97.2 °F (36.2 °C) (08/20/20 1233)  Pulse: 63 (08/20/20 1233)  Resp: 20 (08/20/20 1233)  BP: 118/66 (08/20/20 1233)  SpO2: 97 % (08/20/20 1233) Vital Signs (24h Range):  Temp:  [97 °F (36.1 °C)-99.6 °F (37.6 °C)] 97.2 °F (36.2 °C)  Pulse:  [] 63  Resp:  [16-20] 20  SpO2:  [95 %-99 %] 97 %  BP: (108-127)/(60-73) 118/66     Weight: 63.5 kg (140 lb)  Body mass index is 21.29 kg/m².    Intake/Output Summary (Last 24 hours) at 8/20/2020 1457  Last data filed at 8/20/2020 0532  Gross per 24 hour   Intake 1655 ml   Output 1500 ml   Net 155 ml      Physical Exam  Constitutional:       Appearance: Normal appearance. He is ill-appearing and diaphoretic.   Eyes:      Conjunctiva/sclera: Conjunctivae normal.      Pupils: Pupils are equal, round, and reactive to light.   Cardiovascular:      Rate and Rhythm: Normal rate and regular rhythm.      Pulses: Normal pulses.      Heart sounds: Normal heart sounds.   Pulmonary:      Effort: Pulmonary effort is normal. No respiratory distress.      Breath sounds: Normal breath sounds.   Abdominal:       General: Bowel sounds are normal. There is no distension.      Palpations: Abdomen is soft.      Tenderness: There is no abdominal tenderness.   Musculoskeletal:         General: Swelling and tenderness present.      Comments: Dressings in place to L shoulder, R knee, R ankle.    Skin:     General: Skin is warm.      Findings: No bruising or rash.   Neurological:      Mental Status: He is alert and oriented to person, place, and time.         Significant Labs:   BMP:   Recent Labs   Lab 08/20/20  0454   *   *   K 4.1      CO2 28   BUN 7   CREATININE 0.7   CALCIUM 8.7   MG 2.1     CBC:   Recent Labs   Lab 08/19/20  1140 08/20/20  0454   WBC 9.05 11.07   HGB 13.7* 11.7*   HCT 40.2 34.7*   * 154       Significant Imaging: I have reviewed all pertinent imaging results/findings within the past 24 hours.

## 2020-08-20 NOTE — SUBJECTIVE & OBJECTIVE
"Past Medical History:   Diagnosis Date    IVDU (intravenous drug user) 8/19/2020       History reviewed. No pertinent surgical history.    Review of patient's allergies indicates:   Allergen Reactions    Penicillins Hives     Unclear of last episode of allergic reaction       Current Facility-Administered Medications   Medication    colchicine capsule/tablet 0.6 mg    dextrose 50% injection 12.5 g    dextrose 50% injection 25 g    diazePAM tablet 5 mg    dicyclomine capsule 10 mg    glucagon (human recombinant) injection 1 mg    glucose chewable tablet 16 g    glucose chewable tablet 24 g    indomethacin capsule 50 mg    melatonin tablet 6 mg    ondansetron disintegrating tablet 8 mg    oxyCODONE immediate release tablet 5 mg    pantoprazole EC tablet 40 mg    prochlorperazine tablet 10 mg    senna-docusate 8.6-50 mg per tablet 1 tablet    sodium chloride 0.9% flush 10 mL     Family History     None        Tobacco Use    Smoking status: Current Every Day Smoker     Packs/day: 1.00     Years: 5.00     Pack years: 5.00     Types: Cigarettes    Smokeless tobacco: Never Used   Substance and Sexual Activity    Alcohol use: Not Currently    Drug use: Yes     Types: Opium    Sexual activity: Yes     Partners: Female     Birth control/protection: None     ROS   Per primary team note   Objective:     Vital Signs (Most Recent):  Temp: 98.4 °F (36.9 °C) (08/19/20 1940)  Pulse: 103 (08/19/20 1940)  Resp: 20 (08/19/20 1940)  BP: 127/63 (08/19/20 1940)  SpO2: 99 % (08/19/20 1940) Vital Signs (24h Range):  Temp:  [98.4 °F (36.9 °C)-101.4 °F (38.6 °C)] 98.4 °F (36.9 °C)  Pulse:  [] 103  Resp:  [16-20] 20  SpO2:  [95 %-99 %] 99 %  BP: (108-138)/(61-76) 127/63     Weight: 63.5 kg (140 lb)  Height: 5' 8" (172.7 cm)  Body mass index is 21.29 kg/m².      Ortho/SPM Exam     LUE:  Track marks in AC fossa without surrounding erythema or drainage  No ecchymosis, erythema, or signs of cellulitis  No TTP at " proximal, middle, or distal aspects of humerus, radius, or ulna  Full painless ROM of elbow and wrist  Pain is localized to the anterior shoulder  Range of motion not able to be assessed due to pain  Sensation is intact to light touch throughout   2+ Radial pulse  Brisk capillary refill    RLE:  Skin intact throughout, no scars present  Effusion around the knee and suprapatellar region as well as the ankle  No ecchymosis, erythema, or signs of cellulitis  TTP at all aspects of the knee and ankle  Pain with any range of motion of the knee and ankle  No pain with range of motion of the hip  No tenderness to palpation of proximal, middle, or distal aspects of femur, tibia, or fibula  No tenderness to palpation of foot  Compartments soft  SILT Sa/Bell/DP/SP/T  Motor intact EHL/FHL/TA/Gastroc  2+ DP  Brisk capillary refill           Significant Labs:   CBC:   Recent Labs   Lab 20  1140   WBC 9.05   HGB 13.7*   HCT 40.2   *     CMP:   Recent Labs   Lab 20  1140   *   K 3.9   CL 97   CO2 27   GLU 92   BUN 9   CREATININE 0.8   CALCIUM 9.5   PROT 7.9   ALBUMIN 3.6   BILITOT 0.9   ALKPHOS 110   AST 20   ALT 24   ANIONGAP 10   EGFRNONAA >60.0     Recent Labs   Lab 20  1140   WBC 9.05   SEDRATE 66*   .6*          Significant Imaging: I have reviewed all pertinent imaging results/findings.   X-ray of the left shoulder, right knee, right ankle showing no fractures or other bony abnormalities    Procedure Note:  Right knee, right ankle, left shoulder aspiration  Patient was explained risks, benefits, and alternatives to treatment and verbalized consent to proceed. Time out was performed and patient name, , site, and procedure were confirmed. Skin was sterilely prepared with alcohol solution at all sites. 18 gauge needle on a 60 cc syringe was used for aspiration through anterior shoulder, superior lateral knee, and anterior ankle approaches. 50 cc of yellow colored fluid collected from the  knee, for cc of yellow colored fluid collected from the ankle, 1 cc yellow/red fluid collected from the shoulder. Fluid and cultures were obtained and sent for analysis. Aspiration site was covered with a bandaid.    R Knee Joint Fluid Analysis:  WBC: 31k cells  Segs: 90%  Crystals: negative  Gram Stain: negative  Cultures pending    R Ankle Joint Fluid Analysis:  WBC: 600 cells  Segs: 43%  Crystals: negative  Gram Stain: negative  Cultures pending    L Shoulder Joint Fluid Analysis:  WBC: clotted  Segs: clotted  Crystals: negative  Gram Stain: negative  Cultures pending

## 2020-08-20 NOTE — TRANSFER OF CARE
"Anesthesia Transfer of Care Note    Patient: Bebo Koroma    Procedure(s) Performed: Procedure(s) (LRB):  ARTHROSCOPY, KNEE - cultures & 9 liters saline - arthroscopic leg lozada  (Right)  ARTHROSCOPY, SHOULDER - cultures & 9 liters saline (Left)  ARTHROTOMY, ANKLE - cultures & 9 liters of saline - cysto tubing (Right)    Patient location: [facility] Surgical Floor    Anesthesia Type: general    Transport from OR: Transported from OR on 2-3 L/min O2 by NC with adequate spontaneous ventilation    Post pain: adequate analgesia    Post assessment: no apparent anesthetic complications and tolerated procedure well    Post vital signs: stable    Level of consciousness: awake, alert and oriented    Nausea/Vomiting: no nausea/vomiting    Complications: none    Transfer of care protocol was followed      Last vitals:   Visit Vitals  /63 (BP Location: Right arm, Patient Position: Lying)   Pulse 103   Temp 36.9 °C (98.4 °F) (Oral)   Resp 18   Ht 5' 8" (1.727 m)   Wt 63.5 kg (140 lb)   SpO2 99%   BMI 21.29 kg/m²     "

## 2020-08-20 NOTE — PT/OT/SLP PROGRESS
Occupational Therapy      Patient Name:  Bebo Koroma   MRN:  [id number]    Patient not seen today secondary to patient being unable during 3 attempts (NSG, Echo, and medical team). Will follow-up for an evaluation as scheduled.    KRISTEL Man  8/20/2020

## 2020-08-20 NOTE — ASSESSMENT & PLAN NOTE
Bebo Koroma is a 24 y.o. male IVDU presenting with fevers, left shoulder pain, right knee pain, right ankle pain.  Right knee, right ankle, and right shoulder aspirated at bedside and fluid sent for analysis which returned suggestive of infection.  Blood cultures pending.  Explained to patient the severity of his infection and reviewed all surgical and nonsurgical options with him.  He states his understanding.  He wishes to proceed with surgery at this time.  He has marked, booked, and consented for surgery tonight.  He has been NPO since 9:00 a.m.
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
## 2020-08-20 NOTE — NURSING
Pt moaning at time, c/o of pain 10/10 to right knee, KAVON Pfeiffer notified, Verbal telephone physician order received okay to give Oxycodone 5mg PRN earlier.

## 2020-08-20 NOTE — PT/OT/SLP PROGRESS
Physical Therapy      Patient Name:  Bebo Koroma   MRN:  [id number]    Patient not seen today secondary to (pt attempted x3 with NSG, Echo, and medical team). Will follow-up as able.    Juan Luis King, PT

## 2020-08-20 NOTE — ANESTHESIA POSTPROCEDURE EVALUATION
Anesthesia Post Evaluation    Patient: Bebo Koroma    Procedure(s) Performed: Procedure(s) (LRB):  ARTHROSCOPY, KNEE - cultures & 9 liters saline - arthroscopic leg lozada  (Right)  ARTHROSCOPY, SHOULDER - cultures & 9 liters saline (Left)  ARTHROTOMY, ANKLE - cultures & 9 liters of saline - cysto tubing (Right)    Final Anesthesia Type: general    Patient location during evaluation: PACU  Patient participation: Yes- Able to Participate  Level of consciousness: awake and alert  Post-procedure vital signs: reviewed and stable  Pain management: adequate  Airway patency: patent    PONV status at discharge: No PONV  Anesthetic complications: no      Cardiovascular status: blood pressure returned to baseline  Respiratory status: unassisted  Hydration status: euvolemic  Follow-up not needed.          Vitals Value Taken Time   /60 08/20/20 0206   Temp 36.7 °C (98.1 °F) 08/20/20 0206   Pulse 86 08/20/20 0055   Resp 18 08/20/20 0217   SpO2 98 % 08/20/20 0055         No case tracking events are documented in the log.      Pain/Roland Score: Pain Rating Prior to Med Admin: 10 (8/20/2020  2:17 AM)  Pain Rating Post Med Admin: 7 (8/20/2020  3:17 AM)

## 2020-08-20 NOTE — CONSULTS
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 SARS-CoV-2 RdRp nucleic acid segment.   The analytical sensitivity (limit of detection) is 125 genome   equivalents/mL.   A POSITIVE result implies infection with the SARS-CoV-2 virus;  the patient is presumed to be contagious.    A NEGATIVE result means that SARS-CoV-2 nucleic acids are not  present above the limit of detection. A NEGATIVE result should be   treated as presumptive. It does not rule out the possibility of   COVID-19 and should not be the sole basis for treatment decisions.   If COVID-19 is strongly suspected based on clinical and exposure   history, re-testing using an alternate molecular assay should be   considered.   This test is only for use under the Food and Drug   Administration s Emergency Use Authorization (EUA).   Commercial kits are provided by [employer].   Performance characteristics of the EUA have been independently  verified by [facility] Department of  Pathology and Laboratory Medicine.   _________________________________________________________________  The ID NOW COVID-19 Letter of Authorization, along with the   authorized Fact Sheet for Healthcare Providers, the authorized Fact  Sheet for Patients,  and authorized labeling are available on the FDA   website:  www.fda.gov/MedicalDevices/Safety/EmergencySituations/bpa028516.htm       Sed Rate       Sodium       Specific Gravity, UA       Specimen UA       Marijuana (THC) Metabolite       Toxicology Information       Uric Acid       WBC, UA       WBC             Significant Imaging: I have reviewed all pertinent imaging results/findings within the past 24 hours.           Total Time:  60 minutes      Lev Randhawa MD   Psychiatry PGY-2  [facility]

## 2020-08-20 NOTE — PROGRESS NOTES
Pharmacokinetic Initial Assessment: IV Vancomycin    Assessment/Plan:    Intravenous vancomycin was initiated prior to surgery with a dose of 1250 mg.   A maintenance dose of 1000 mg every 12 hours to follow.   Desired empiric serum trough concentration is 15 to 20 mcg/mL  Draw vancomycin trough level 30 min prior to fourth dose on 08/21 at approximately 0100  Pharmacy will continue to follow and monitor vancomycin.      Please contact pharmacy at extension [phone] with any questions regarding this assessment.     Thank you for the consult,   Navjot NGA Nakia       Patient brief summary:  Bebo Koroma is a 24 y.o. male initiated on antimicrobial therapy with IV Vancomycin for treatment of suspected bone/joint infection    Drug Allergies:   Review of patient's allergies indicates:   Allergen Reactions    Penicillins Hives     Unclear of last episode of allergic reaction       Actual Body Weight:   63.5 kg    Renal Function:   Estimated Creatinine Clearance: 129 mL/min (based on SCr of 0.8 mg/dL).,     Dialysis Method (if applicable):  N/A    CBC (last 72 hours):  Recent Labs   Lab Result Units 08/19/20  1140   WBC K/uL 9.05   Hemoglobin g/dL 13.7*   Hematocrit % 40.2   Platelets K/uL 141*   Gran% % 65.9   Lymph% % 20.4   Mono% % 12.5   Eosinophil% % 0.1   Basophil% % 0.3   Differential Method  Automated       Metabolic Panel (last 72 hours):  Recent Labs   Lab Result Units 08/19/20  1140 08/19/20  1141   Sodium mmol/L 134*  --    Potassium mmol/L 3.9  --    Chloride mmol/L 97  --    CO2 mmol/L 27  --    Glucose mg/dL 92  --    Glucose, UA   --  Negative   BUN, Bld mg/dL 9  --    Creatinine mg/dL 0.8  --    Albumin g/dL 3.6  --    Total Bilirubin mg/dL 0.9  --    Alkaline Phosphatase U/L 110  --    AST U/L 20  --    ALT U/L 24  --        Drug levels (last 3 results):  No results for input(s): VANCOMYCINRA, VANCOMYCINPE, VANCOMYCINTR in the last 72 hours.    Microbiologic Results:  Microbiology Results (last 7 days)      Procedure Component Value Units Date/Time    Blood culture x two cultures. Draw prior to antibiotics. [[id number]] Collected: 08/19/20 1140    Order Status: Completed Specimen: Blood from Peripheral, Antecubital, Right Updated: 08/19/20 2115     Blood Culture, Routine No Growth to date    Narrative:      Aerobic and anaerobic    Blood culture x two cultures. Draw prior to antibiotics. [[id number]] Collected: 08/19/20 1141    Order Status: Completed Specimen: Blood from Peripheral, Forearm, Right Updated: 08/19/20 2115     Blood Culture, Routine No Growth to date    Narrative:      Aerobic and anaerobic    Gram stain [[id number]] Collected: 08/19/20 1802    Order Status: Completed Specimen: Joint Fluid from Knee, Right Updated: 08/19/20 1932     Gram Stain Result Rare WBC's      No organisms seen    Gram stain [[id number]] Collected: 08/19/20 1804    Order Status: Completed Specimen: Joint Fluid from Ankle, Right Updated: 08/19/20 1930     Gram Stain Result Rare WBC's      No organisms seen    Gram stain [[id number]] Collected: 08/19/20 1805    Order Status: Completed Specimen: Joint Fluid from Shoulder, Left Updated: 08/19/20 1929     Gram Stain Result No WBC's      No organisms seen    Culture, Anaerobic [[id number]] Collected: 08/19/20 1802    Order Status: Sent Specimen: Joint Fluid from Knee, Right Updated: 08/19/20 1816    Fungus culture [[id number]] Collected: 08/19/20 1802    Order Status: Sent Specimen: Joint Fluid from Knee, Right Updated: 08/19/20 1816    Aerobic culture [[id number]] Collected: 08/19/20 1802    Order Status: Sent Specimen: Joint Fluid from Knee, Right Updated: 08/19/20 1816    AFB Culture & Smear [[id number]] Collected: 08/19/20 1802    Order Status: Sent Specimen: Joint Fluid from Knee, Right Updated: 08/19/20 1816    Culture, Anaerobic [[id number]] Collected: 08/19/20 1804    Order Status: Sent Specimen: Joint Fluid from Ankle, Right Updated: 08/19/20 1813    Aerobic culture [[id number]]  Collected: 08/19/20 1804    Order Status: Sent Specimen: Joint Fluid from Ankle, Right Updated: 08/19/20 1813    Fungus culture [[id number]] Collected: 08/19/20 1804    Order Status: Sent Specimen: Joint Fluid from Ankle, Right Updated: 08/19/20 1813    AFB Culture & Smear [[id number]] Collected: 08/19/20 1804    Order Status: Sent Specimen: Joint Fluid from Ankle, Right Updated: 08/19/20 1812    Aerobic culture [[id number]] Collected: 08/19/20 1805    Order Status: Sent Specimen: Joint Fluid from Shoulder, Left Updated: 08/19/20 1811    AFB Culture & Smear [[id number]] Collected: 08/19/20 1805    Order Status: Sent Specimen: Joint Fluid from Shoulder, Left Updated: 08/19/20 1811    Fungus culture [[id number]] Collected: 08/19/20 1805    Order Status: Sent Specimen: Joint Fluid from Shoulder, Left Updated: 08/19/20 1811    Culture, Anaerobic [[id number]] Collected: 08/19/20 1805    Order Status: Sent Specimen: Joint Fluid from Shoulder, Left Updated: 08/19/20 1811    Blood culture [[id number]] Collected: 08/19/20 1644    Order Status: Sent Specimen: Blood from Peripheral, Antecubital, Right Updated: 08/19/20 1707    C. trachomatis/N. gonorrhoeae by AMP DNA [[id number]] Collected: 08/19/20 1140    Order Status: Sent Specimen: Genital Updated: 08/19/20 1246

## 2020-08-20 NOTE — ASSESSMENT & PLAN NOTE
Bebo Koroma is a 24 y.o. male IVDU presenting with fevers, left shoulder pain, right knee pain, right ankle pain s/p L shoulder arthroscopy, R knee arthroscopy and R ankle arthrotomy on 8/19/20. Pain has improved significantly since yesterday, but having some soreness with ROM.    -Trend ESR and CRP daily  -Follow cultures, ngtd  -PT/OT WBAT in all joints  -Abx, continue vanc/rocephin;  ID recs once cx and sensitivities  -DVT asa 81 daily per medicine team

## 2020-08-20 NOTE — ASSESSMENT & PLAN NOTE
ASSESSMENT:      DIAGNOSES & PROBLEMS     Opioid Use Disorder, Severe  Depression, Unspecified  Anxiety, unspecified        STRENGTHS AND LIABILITIES   Strength: Patient has reasonable judgment., Liability: Patient is dependent.        MOTIVATION TO PURSUE RECOVERY: moderate     ACCEPTANCE OF ADDICTION: fair     ABILITY TO ADHERE TO TREATMENT PLAN: moderate        PLAN:         MANAGEMENT PLAN, TREATMENT GOALS, THERAPEUTIC TECHNIQUES/APPROACHES & CLINICAL REASONING     Recommend resuming home medications for mod, anxiety:  -Abilify 5mg qd  -Prozac 10mg qd     Pt with recent hx for suboxone Rx  Pt is amenable to resuming this in the hospital.  Will plan for suboxone initiation once he no longer requires pain control.  Pt currently complaining of 10/10 pain in his knee, shoulder s/p scope. Pt must be off all opioid medications for atleast 18 hours prior to suboxone initiation.        Opiate Withdrawal Protocol:  -Clonidine 0.1mg PO q4hr PRN for withdrawal associated HTN  -Bentyl 10mg PO q6hr PRN for abdominal muscle cramps  -Loperamide 2mg PO q6hr PRN for diarrhea  -Robaxin 500mg PO q6hr PRN for muscle spasm  -Zofran 4mg ODT q8hr PRN for nausea  -Vistaril 50mg PO qHS PRN for insomnia           · Patient counseled on abstinence from alcohol and substances of abuse (illicit and prescription).  · Additional workup planned to address substance use disorder, in order to guide and refine ongoing management options, includes serial alcohol and drug laboratory testing (e.g. PETH, urine toxicology).  · Relapse prevention and motivational interviewing provided.  · Education provided on 12 step recovery programs.        PRESCRIPTION DRUG MANAGEMENT  - The risks and benefits of medication were discussed with this patient.  - Possible expectable adverse effects of any current or proposed individual psychotropic agents were discussed with this patient.  - Counseling was provided on the importance of full compliance with  medication regimens.        In cases of emergency, daily coverage provided by Acute/ER Psych MD, NP, or SW, with contact numbers located in [facility] On Call Schedule     Case discussed with staff addiction psychiatrist: MAGED LUCERO MD       Significant Labs:   Last 24 Hours:   Recent Lab Results       08/20/20  0454   08/20/20  0129   08/19/20  1805   08/19/20  1804   08/19/20  1802        Alcohol, Urine   <10           Benzodiazepines   Negative           Methadone metabolites   Negative           WBC, Body Fluid       665  Comment:  Reference ranges for body fluids not established.   Correlate clinically.         Lymphs, Fluid       18       Segs, Fluid       43       Phencyclidine   Negative           Body Fluid Type       Joint Fluid, Right Ankle       Monocytes/Macrophages, Fluid       39       Body Fluid Source, Crystal Exam       Other (Specify)  Comment:  joint fluid right ankle       Body Fluid Crystal       Negative       Pathologist Review, Body Fluid       REVIEWED  Comment:  Electronically reviewed and signed by:  Pablo Campbell M.D.  Signed on 08/20/20 at 09:41  No urate or pyrophosphate crystals identified         Procalcitonin               Aerobic Bacterial Culture     No growth[P] No growth[P] No growth[P]     Albumin 3.0             Alkaline Phosphatase 91             ALT 18             Amphetamine Screen, Ur   Negative           Anaerobic Culture     Culture in progress[P] Culture in progress[P] Culture in progress[P]     Anion Gap 6             Appearance, UA               AST 16             Bacteria, UA               Barbiturate Screen, Ur   Negative           Baso # 0.02             Basophil% 0.2             Bilirubin (UA)               BILIRUBIN TOTAL 0.6  Comment:  For infants and newborns, interpretation of results should be based  on gestational age, weight and in agreement with clinical  observations.  Premature Infant recommended reference ranges:  Up to 24  hours.............<8.0 mg/dL  Up to 48 hours............<12.0 mg/dL  3-5 days..................<15.0 mg/dL  6-29 days.................<15.0 mg/dL               Blood Culture, Routine               BUN, Bld 7             Calcium 8.7             Chloride 100             CO2 28             Cocaine (Metab.)   Negative           Color, UA               Creatinine 0.7             Creatinine, Random Ur   52.0  Comment:  The random urine reference ranges provided were established   for 24 hour urine collections.  No reference ranges exist for  random urine specimens.  Correlate clinically.             CRP               Differential Method Automated             eGFR if  >60.0             eGFR if non  >60.0  Comment:  Calculation used to obtain the estimated glomerular filtration  rate (eGFR) is the CKD-EPI equation.                Eos # 0.0             Eosinophil% 0.1             Fluid Appearance       Bloody       Fluid Color       Red       Glucose 216             Glucose, UA               Gram Stain Result     No WBC's Rare WBC's Rare WBC's          No organisms seen No organisms seen No organisms seen     Gran # (ANC) 9.0             Gran% 81.1             Hematocrit 34.7             Hemoglobin 11.7             Hyaline Casts, UA               Immature Grans (Abs) 0.11  Comment:  Mild elevation in immature granulocytes is non specific and   can be seen in a variety of conditions including stress response,   acute inflammation, trauma and pregnancy. Correlation with other   laboratory and clinical findings is essential.               Immature Granulocytes 1.0             Ketones, UA               Lactate, Moo               Leukocytes, UA               Lymph # 1.4             Lymph% 12.5             Magnesium 2.1             MCH 31.0             MCHC 33.7             MCV 92             Microscopic Comment               Mono # 0.6             Mono% 5.1             MPV 11.6             NITRITE  UA               nRBC 0             Occult Blood UA               Opiate Scrn, Ur   Presumptive Positive           pH, UA               Phosphorus 3.0             Platelets 154             Potassium 4.1             PROTEIN TOTAL 6.9             Protein, UA               RBC 3.77             RBC, UA               RDW 12.5             SARS-CoV-2 RNA, Amplification, Qual               Sed Rate               Sodium 134             Specific [place], UA               Specimen UA               Marijuana (THC) Metabolite   Presumptive Positive           Toxicology Information   SEE COMMENT  Comment:  This screen includes the following classes of drugs at the   listed cut-off:  Benzodiazepines                  200 ng/ml  Methadone                        300 ng/ml  Cocaine metabolite               300 ng/ml  Opiates                          300 ng/ml  Barbiturates                     200 ng/ml  Amphetamines                    1000 ng/ml  Marijuana metabs (THC)            50 ng/ml  Phencyclidine (PCP)               25 ng/ml  High concentrations of Diphenhydramine may cross-react with  Phencyclidine PCP screening immunoassay giving a false   positive result.  High concentrations of Methylenedioxymethamphetamine (MDMA aka  Ectasy) and other structurally similar compounds may cross-   react with the Amphetamine/Methamphetamine screening   immunoassay giving a false positive result.  A metabolite of the anti-HIV drug Sustiva () may cause  false positive results in the Marijuana metabolite (THC)   screening assay.  Note: This exception list includes only more common   interferants in toxicology screen testing.  Because of many   cross-reactantspositive results on toxicology drug screens   should be confirmed whenever results do not correlate with   clinical presentation.  This report is intended for use in clinical monitoring and  management of patients. It is not intended for use in   employment related drug  testing.  Because of any cross-reactants, positive results on toxicology  drug screens should be confirmed whenever results do not  correlate with clinical presentation.  Presumptive positive results are unconfirmed and may be used   only for medical purposes.  Assay Intended Use: This assay provides only a preliminary analytical  test result. A more specific alternate chemical method must be used  to obtain a confirmed analytical result. Gas chromatography/mass  spectrometry (GS/MS)is the preferred confirmatory method. Clinical  consideration and professional judgement should be applied to any   drug of abuse test result, particularly when preliminary results  are used.             Uric Acid               WBC, UA               WBC 11.07                              08/19/20  1801   08/19/20  1644   08/19/20  1643   08/19/20  1141   08/19/20  1140        Alcohol, Urine               Benzodiazepines               Methadone metabolites               WBC, Body Fluid 95347  Comment:  Reference ranges for body fluids not established.   Correlate clinically.               Lymphs, Fluid 4             Segs, Fluid 90             Phencyclidine               Body Fluid Type Joint Fluid, Right Knee             Monocytes/Macrophages, Fluid 6             Body Fluid Source, Crystal Exam Joint Fluid, Right Knee             Body Fluid Crystal Negative             Pathologist Review, Body Fluid REVIEWED  Comment:  Electronically reviewed and signed by:  Pablo Campbell M.D.  Signed on 08/20/20 at 09:39  No urate or pyrophosphate crystals identified               Procalcitonin     0.67  Comment:  A concentration < 0.25 ng/mL represents a low risk bacterial   infection.  Procalcitonin may not be accurate among patients with localized   infection, recent trauma or major surgery, immunosuppressed state,   invasive fungal infection, renal dysfunction. Decisions regarding   initiation or continuation of antibiotic therapy should not be based    solely on procalcitonin levels.           Aerobic Bacterial Culture               Albumin         3.6     Alkaline Phosphatase         110     ALT         24     Amphetamine Screen, Ur               Anaerobic Culture               Anion Gap         10     Appearance, UA       Hazy       AST         20     Bacteria, UA       Rare       Barbiturate Screen, Ur               Baso #         0.03     Basophil%         0.3     Bilirubin (UA)       Negative       BILIRUBIN TOTAL         0.9  Comment:  For infants and newborns, interpretation of results should be based  on gestational age, weight and in agreement with clinical  observations.  Premature Infant recommended reference ranges:  Up to 24 hours.............<8.0 mg/dL  Up to 48 hours............<12.0 mg/dL  3-5 days..................<15.0 mg/dL  6-29 days.................<15.0 mg/dL       Blood Culture, Routine   No Growth to date[P]   No Growth to date[P] No Growth to date[P]     BUN, Bld         9     Calcium         9.5     Chloride         97     CO2         27     Cocaine (Metab.)               Color, UA       [place]       Creatinine         0.8     Creatinine, Random Ur               CRP         164.6     Differential Method         Automated     eGFR if          >60.0     eGFR if non          >60.0  Comment:  Calculation used to obtain the estimated glomerular filtration  rate (eGFR) is the CKD-EPI equation.        Eos #         0.0     Eosinophil%         0.1     Fluid Appearance Cloudy             Fluid Color Colorless             Glucose         92     Glucose, UA       Negative       Gram Stain Result               Gran # (ANC)         6.0     Gran%         65.9     Hematocrit         40.2     Hemoglobin         13.7     Hyaline Casts, UA       0       Immature Grans (Abs)         0.07  Comment:  Mild elevation in immature granulocytes is non specific and   can be seen in a variety of conditions including stress response,    acute inflammation, trauma and pregnancy. Correlation with other   laboratory and clinical findings is essential.       Immature Granulocytes         0.8     Ketones, UA       Negative       Lactate, Moo         1.1  Comment:  Falsely low lactic acid results can be found in samples   containing >=13.0 mg/dL total bilirubin and/or >=3.5 mg/dL   direct bilirubin.       Leukocytes, UA       Negative       Lymph #         1.9     Lymph%         20.4     Magnesium               MCH         31.2     MCHC         34.1     MCV         92     Microscopic Comment       SEE COMMENT  Comment:  Other formed elements not mentioned in the report are not   present in the microscopic examination.          Mono #         1.1     Mono%         12.5     MPV         12.2     NITRITE UA       Negative       nRBC         0     Occult Blood UA       3+       Opiate Scrn, Ur               pH, UA       7.0       Phosphorus               Platelets         141     Potassium         3.9     PROTEIN TOTAL         7.9     Protein, UA       2+  Comment:  Recommend a 24 hour urine protein or a urine   protein/creatinine ratio if globulin induced proteinuria is  clinically suspected.         RBC         4.39     RBC, UA       49       RDW         12.7     SARS-CoV-2 RNA, Amplification, Qual               Sed Rate         66     Sodium         134     Specific [place], UA       1.020       Specimen UA       Urine, Clean Catch       Marijuana (THC) Metabolite               Toxicology Information               Uric Acid     2.5         WBC, UA       4       WBC         9.05                      08/19/20  1036        Alcohol, Urine       Benzodiazepines       Methadone metabolites       WBC, Body Fluid       Lymphs, Fluid       Segs, Fluid       Phencyclidine       Body Fluid Type       Monocytes/Macrophages, Fluid       Body Fluid Source, Crystal Exam       Body Fluid Crystal       Pathologist Review, Body Fluid       Procalcitonin       Aerobic  Bacterial Culture       Albumin       Alkaline Phosphatase       ALT       Amphetamine Screen, Ur       Anaerobic Culture       Anion Gap       Appearance, UA       AST       Bacteria, UA       Barbiturate Screen, Ur       Baso #       Basophil%       Bilirubin (UA)       BILIRUBIN TOTAL       Blood Culture, Routine       BUN, Bld       Calcium       Chloride       CO2       Cocaine (Metab.)       Color, UA       Creatinine       Creatinine, Random Ur       CRP       Differential Method       eGFR if        eGFR if non        Eos #       Eosinophil%       Fluid Appearance       Fluid Color       Glucose       Glucose, UA       Gram Stain Result       Gran # (ANC)       Gran%       Hematocrit       Hemoglobin       Hyaline Casts, UA       Immature Grans (Abs)       Immature Granulocytes       Ketones, UA       Lactate, Moo       Leukocytes, UA       Lymph #       Lymph%       Magnesium       MCH       MCHC       MCV       Microscopic Comment       Mono #       Mono%       MPV       NITRITE UA       nRBC       Occult Blood UA       Opiate Scrn, Ur       pH, UA       Phosphorus       Platelets       Potassium       PROTEIN TOTAL       Protein, UA       RBC       RBC, UA       RDW       SARS-CoV-2 RNA, Amplification, Qual Negative  Comment:  This test utilizes isothermal nucleic acid amplification   technology to detect the SARS-CoV-2 RdRp nucleic acid segment.   The analytical sensitivity (limit of detection) is 125 genome   equivalents/mL.   A POSITIVE result implies infection with the SARS-CoV-2 virus;  the patient is presumed to be contagious.    A NEGATIVE result means that SARS-CoV-2 nucleic acids are not  present above the limit of detection. A NEGATIVE result should be   treated as presumptive. It does not rule out the possibility of   COVID-19 and should not be the sole basis for treatment decisions.   If COVID-19 is strongly suspected based on clinical and exposure   history,  re-testing using an alternate molecular assay should be   considered.   This test is only for use under the Food and Drug   Administration s Emergency Use Authorization (EUA).   Commercial kits are provided by [employer].   Performance characteristics of the EUA have been independently  verified by [facility] Department of  Pathology and Laboratory Medicine.   _________________________________________________________________  The ID NOW COVID-19 Letter of Authorization, along with the   authorized Fact Sheet for Healthcare Providers, the authorized Fact  Sheet for Patients, and authorized labeling are available on the FDA   website:  www.fda.gov/MedicalDevices/Safety/EmergencySituations/tpw195166.htm       Sed Rate       Sodium       Specific Gravity, UA       Specimen UA       Marijuana (THC) Metabolite       Toxicology Information       Uric Acid       WBC, UA       WBC             Significant Imaging: I have reviewed all pertinent imaging results/findings within the past 24 hours.

## 2020-08-20 NOTE — OP NOTE
OP NOTE    DOS:  08/19/2020    Preop Dx: 1.  Probable septic arthritis of left shoulder    2.  Probable septic arthritis of right knee    3.  Probable septic arthritis of right ankle    Postop Dx: 1.  Probable septic arthritis of left shoulder    2.  Probable septic arthritis of right knee    3.  Probable septic arthritis of right ankle     Procedure: 1.  Left shoulder arthroscopy with irrigation for probable septic arthritis - 36082    2.  Right knee arthroscopy with irrigation for probable septic arthritis - 03391    3.  Right ankle arthrotomy with irrigation for probable septic arthritis- 46801    Surgeon: Kamran Levy M.D.    Asst:  Glenn Driscoll M.D    Anesthesia: GETA    EBL:  Minimal    IVF:  2000cc crystalloid    Specimens: None    Findings: Mild effusions with not much purulence.  No synovitis.    Dispo:  To PACU extubated/stable       Indications for Procedure:      24-year-old IV drug abuser presented to the emergency department complaining of pain in the left shoulder right knee and right ankle and fever 101.4.  Joints were aspirated and all had murky synovial fluid.  We were unable to get a cell count from the left shoulder, but the right knee was 31,000 with 90% segmented neutrophils.  The right ankle was only 660.  Crystal analysis was negative for all.  All had this same murky fluid and the patient is febrile.  I think we are catching a very early septic arthritis of multiple joints or some other type of polyarthritis.  Given the patient's continued IV drug abuse and being febrile upon presentation we are going the operating room for washout of all these joints.  I will do the shoulder and knee arthroscopically in the ankle with a small arthrotomy.  The risks, benefits and alternatives to surgery discussed the patient prior to going the operating room.  Informed consent was obtained.    Procedure in Detail:    Patient identified the preoperative holding area the sites were marked.  Patient was  wheeled in the operating room placed on the operating table in supine position.  General endotracheal anesthesia was induced.  The patient was then placed into a beach chair position on the be sure lozada low bony prominences well padded.  We began by prepping the left shoulder and upper extremity in sterile fashion.  A time-out was undertaken to confirm patient, sides, sites, surgeries, surgeon.  All agreed we proceeded.    I established a posterior portal the shoulder with 11 blade.  We entered the glenohumeral joint.  There was not a significant amount effusion in the joint but just a small amount of murky fluid.  Fluid had been taken for culture prior to antibiotics in the ER.  At this point an anterior portal was established just above the subscapularis and the shaver was introduced.  A minimal amount of pedunculated tissue was shaved.  There is not a significant synovitis.  The shoulder was run explored and the biceps tendon and labrum were intact.  Humeral head and glenoid had no defects.  The rotator cuff appeared to be intact.  At this point we ran copious amounts of normal saline solution through the glenohumeral joint, and suctioned any remaining fluid out.  This point through the posterior portal we entered the subacromial space.  He did not have a significant synovitis and elected just irrigated this copiously normal saline solution and not perform synovectomy.  At this point the portal sites were closed with 3-0 nylon suture.  I made a mixture of 3 g of vancomycin powder in 2 g of ceftriaxone powder in 30 cc of saline and injected 1/3 of this into the glenohumeral and subacromial spaces.  Sterile dressings were then applied.    At this point we kept the back table sterile and broke down the drapes.  I placed the patient supine and spun the table.  We then prepped and draped the right lower extremity in sterile fashion.    On the right knee established an inferolateral portal with an 11 blade and then  entered the joint with the camera.  I began in the patellofemoral space.  There was not a significant synovitis.  He did have little bit of murky fluid which came out in the initial portion of the entry.  I ran the knee through the medial gutter into the medial joint line, the notch, the lateral joint line and then back through the lateral gutter.  I saw no pathology.  At this point I ran a significant amount of normal saline solution through the knee and then suctioned any excess fluid out of the knee and removed the arthroscopic instrumentation.  I elected not to stable shin inferomedial portal as I do not think this would be of any benefit.  I closed the portal site with 3-0 nylon suture and injected 15 cc of the aforementioned antibiotic solution into the knee joint.    At this point I went to the ankle and made a small 1 cm anteromedial incision overlying the medial gutter medial to the tibialis anterior.  I entered through the joint capsule and had been murky fluid come out of here as well.  I then placed a Pineda tip suction device onto the end of the arthroscopic saline source and placed this into the joint and used this is an irrigation tool to copiously irrigate the ankle joint with normal saline solution.  After this was completed we then closed this with 3-0 nylon suture.  Sterile dressings were applied to the knee and ankle.    All instrument sponge counts were reported correct in the case.  There no complications.  The patient was extubated, awakened and taken to recovery room stable condition.    Plan the patient:    Will follow his cultures from his initial aspirations done in the emergency department prior getting antibiotics.  I think this is either a very early case of septic arthritis of multiple joints versus inflammatory poly arthritis.  Given his febrile state and IV drug abuse I think he should be treated as though the septic arthritis even if his cultures fail to grow anything.  I had a  discussion with the patient about the nature of his situation and the fact that ongoing IV drug use will likely put him back in the situation again.  I had a discussion the patient about compliance with medical treatment and antibiotic regimen.  He stated understanding.    Kamran Levy MD

## 2020-08-20 NOTE — ASSESSMENT & PLAN NOTE
Pt reports hx of anxiety/depression on Abilify and Prozac at home. He states he takes Prozac 40 mg daily and Abilify 20 mg daily which is different than how is documented in the chart.     Plan:  -- Psych consulted for medication recs. Following  -- Abilify 5 mg daily  -- Prozac 10 mg daily

## 2020-08-20 NOTE — PLAN OF CARE
CM assessment complete, patient AAOX4 and participated in interview at bedside. Pt's Dadparminder at bedside and participated in interview per son's permission. Pt lives in [place], [place] according to pt. Pt lives in [place] with girlfriend. Dad states son will not return to [place] but [place] but couldn't verify an address  at this time. Pt has no DME and is independent in all ADLs. Will cont to follow.       08/20/20 1113   Discharge Assessment   Assessment Type Discharge Planning Assessment   Confirmed/corrected address and phone number on facesheet? Yes   Assessment information obtained from? Patient;Caregiver   Expected Length of Stay (days) 3   Communicated expected length of stay with patient/caregiver yes   Prior to hospitilization cognitive status: Alert/Oriented   Prior to hospitalization functional status: Independent   Current cognitive status: Alert/Oriented   Current Functional Status: Independent   Facility Arrived From: Home visiting her in [place], pt lives in [place]   Lives With significant other   Able to Return to Prior Arrangements yes   Is patient able to care for self after discharge? Unable to determine at this time (comments)   Who are your caregiver(s) and their phone number(s)? Parminder Koroma (dad) [phone]   Patient's perception of discharge disposition home or selfcare   Readmission Within the Last 30 Days no previous admission in last 30 days   Patient currently being followed by outpatient case management? No   Patient currently receives any other outside agency services? No   Equipment Currently Used at Home none   Do you have any problems affording any of your prescribed medications? No   Is the patient taking medications as prescribed? yes   Does the patient have transportation home? Yes   Transportation Anticipated family or friend will provide   Does the patient receive services at the [facility]? No   Discharge Plan A Home   Discharge Plan B Home with family    DME Needed Upon Discharge  none   Patient/Family in Agreement with Plan yes   Elham Nobles, MSN  Case Management  Ext [phone]

## 2020-08-20 NOTE — ASSESSMENT & PLAN NOTE
25 y/o male with IVDU presents with polyarthritis and fevers at home. He developed right ankle, right knee and left shoulder pain 3 days ago with associated fever, chills, fatigue, and night sweats. Denies n/v and chest pain. He last IV drug use was 3 days ago and inject to left arm.     tmax 101.4 in ED with elevated inflammatory markers. started on vancomycin and zosyn in ED. underwent aspiration of multiple joints yesterday (R knee 31K cells 90% segs, right ankle 600 WBC 43%segs, left shoulder no data)  He underwent washout with orthopedics yesterday. Per op note, murky fluid was encountered from joint spaces. Cultures pending. infectious workup pending. Blood cultures NGTD.    Recommendations  1. Continue vancomycin and ceftriaxone  2. Will follow cultures and tailor abx accordingly  3. Likely need long term IV abx therapy.   4. ID will follow with you

## 2020-08-20 NOTE — SUBJECTIVE & OBJECTIVE
"Principal Problem:SIRS (systemic inflammatory response syndrome)    Principal Orthopedic Problem: s/p L shoulder arthroscopy, R knee arthroscopy and R ankle arthrotomy on 8/19/20    Interval History: The patient was seen and examined at the bedside. NAEON. Tolerating PO intake. VSS. Pain improved in L shoulder, R knee and R ankle. Still sore with ROM of all affected joints. Ngtd cultures.      Review of patient's allergies indicates:   Allergen Reactions    Penicillins Hives     Unclear of last episode of allergic reaction       Current Facility-Administered Medications   Medication    acetaminophen tablet 650 mg    ARIPiprazole tablet 5 mg    aspirin EC tablet 81 mg    cefTRIAXone (ROCEPHIN) 2 g/50 mL D5W IVPB    cloNIDine tablet 0.1 mg    dextrose 50% injection 12.5 g    dextrose 50% injection 25 g    dicyclomine capsule 10 mg    FLUoxetine capsule 10 mg    glucagon (human recombinant) injection 1 mg    glucose chewable tablet 16 g    glucose chewable tablet 24 g    HYDROmorphone injection 1 mg    hydrOXYzine pamoate capsule 50 mg    loperamide capsule 2 mg    LORazepam tablet 0.5 mg    melatonin tablet 6 mg    methocarbamoL tablet 500 mg    ondansetron disintegrating tablet 8 mg    oxyCODONE immediate release tablet Tab 10 mg    pantoprazole EC tablet 40 mg    prochlorperazine tablet 10 mg    senna-docusate 8.6-50 mg per tablet 1 tablet    sodium chloride 0.9% flush 10 mL    vancomycin in dextrose 5 % 1 gram/250 mL IVPB 1,000 mg     Objective:     Vital Signs (Most Recent):  Temp: 96.5 °F (35.8 °C) (08/20/20 1530)  Pulse: 74 (08/20/20 1530)  Resp: 16 (08/20/20 1646)  BP: 118/66 (08/20/20 1233)  SpO2: 99 % (08/20/20 1530) Vital Signs (24h Range):  Temp:  [96.5 °F (35.8 °C)-98.4 °F (36.9 °C)] 96.5 °F (35.8 °C)  Pulse:  [] 74  Resp:  [16-20] 16  SpO2:  [97 %-99 %] 99 %  BP: (115-127)/(60-66) 118/66     Weight: 63.5 kg (140 lb)  Height: 5' 8" (172.7 cm)  Body mass index is 21.29 " kg/m².      Intake/Output Summary (Last 24 hours) at 8/20/2020 1819  Last data filed at 8/20/2020 0532  Gross per 24 hour   Intake 1655 ml   Output 1500 ml   Net 155 ml       Ortho/SPM Exam     LUE  Dressings c,d,i  Mild soreness with passive shoulder ROM  SILT M/U/R  Motor intact AIN/PIN/M/U/R   Cap refill < 2s  2+ RP    RLE:  Dressings to ankle and knee c,d,i  Soreness with ankle and knee passive  SILT Sa/Bell/DP/SP/T  Motor intact EHL/FHL/TA/Gastroc  2+ DP, 2+ PT          Significant Labs:   CBC:   Recent Labs   Lab 08/19/20  1140 08/20/20  0454   WBC 9.05 11.07   HGB 13.7* 11.7*   HCT 40.2 34.7*   * 154     CMP:   Recent Labs   Lab 08/19/20  1140 08/20/20  0454   * 134*   K 3.9 4.1   CL 97 100   CO2 27 28   GLU 92 216*   BUN 9 7   CREATININE 0.8 0.7   CALCIUM 9.5 8.7   PROT 7.9 6.9   ALBUMIN 3.6 3.0*   BILITOT 0.9 0.6   ALKPHOS 110 91   AST 20 16   ALT 24 18   ANIONGAP 10 6*   EGFRNONAA >60.0 >60.0     CRP:   Recent Labs   Lab 08/19/20  1140   .6*     All pertinent labs within the past 24 hours have been reviewed.    Significant Imaging: I have reviewed all pertinent imaging results/findings.

## 2020-08-20 NOTE — PROGRESS NOTES
[facility] Hospital Medicine  Progress Note    Patient Name: Bebo Koroma  MRN: [id number]  Patient Class: IP- Inpatient   Admission Date: 8/19/2020  Length of Stay: 0 days  Attending Physician: Ronna Greene MD  Primary Care Provider: Primary Doctor [facility] Medicine Team: [facility] 2 Daly Hale MD    Subjective:     Principal Problem:SIRS (systemic inflammatory response syndrome)        HPI:  Mr. Koroma is a 24 year old M with a PMHx of IV drug abuse and depression that presented to the ED complaining of left shoulder pain, right knee pain, and right ankle pain. Pain started when the patient woke up about 3 days ago, mostly left shoulder pain at that time. He then developed right ankle pain and right knee pain. He describes the pain as constant, sharp and worse with movement. Shoulder pain radiates down to the antecubital fossa region. He tried taking Tylenol with no relief. He has experienced significant decrease in ROM of left shoulder and states that he could not walk for 2 days due to his pain. Reports subjective fevers, chills, fatigue, general weakness, night sweats. Denies N/V, chest pain, SOB, abdominal pain, diarrhea, pain with urination. Patient reports that he last used IV opioids about 3 days ago. Injects into the left arm. On Subox-one at home but has not taken in 3 days. Denies alcohol use and other recreational drugs.     Pt arrived to ED with temp of 101.4, tachycardic elevated CRP and ESR. WBC and lactate wnl. Blood cultures done and 1 dose of vanc and zosyn given along with IVF. Xray L shoulder with no significant findings.     Overview/Hospital Course:  Pt admitted to IM 2 for further management of sepsis (source unidentified). Ortho consulted to r/o septic arthritis. Xray of right knee and ankle ordered. Psych consulted for management of addiction and medications (on Abilify, Prozac, and Subox-one at home). R knee tapped (31K WBC, 90% segs) R ankle tapped  (600 WBC, 43% seg), L shoulder tapped (clotted). Cultures pending. Taken to the OR on 8/19 for arthroscopy of knee and shoulder and arthrotomy of ankle. 8/20 pt in pain, crying, sweating with chills. Subox-one unable to be restarted due to patient being on opioids. Adjusted pain medications for better pain control. Started opioid withdrawal protocol per psych recs. Restarting Abilify and Prozac.    Interval History: POD 1. Pt crying in pain upon exam this AM. He reports chills and is sweating. Likely in withdrawal with post-op pain.    Review of Systems   Constitutional: Positive for appetite change, chills and diaphoresis. Negative for fever.   HENT: Negative for congestion, sore throat and trouble swallowing.    Eyes: Negative for photophobia, pain and discharge.   Respiratory: Negative for cough and shortness of breath.    Cardiovascular: Negative for chest pain and palpitations.   Gastrointestinal: Negative for abdominal pain, diarrhea, nausea and vomiting.   Genitourinary: Negative for difficulty urinating.   Musculoskeletal: Positive for arthralgias and joint swelling. Negative for back pain, myalgias and neck pain.   Skin: Negative for pallor and rash.   Neurological: Negative for light-headedness, numbness and headaches.     Objective:     Vital Signs (Most Recent):  Temp: 97.2 °F (36.2 °C) (08/20/20 1233)  Pulse: 63 (08/20/20 1233)  Resp: 20 (08/20/20 1233)  BP: 118/66 (08/20/20 1233)  SpO2: 97 % (08/20/20 1233) Vital Signs (24h Range):  Temp:  [97 °F (36.1 °C)-99.6 °F (37.6 °C)] 97.2 °F (36.2 °C)  Pulse:  [] 63  Resp:  [16-20] 20  SpO2:  [95 %-99 %] 97 %  BP: (108-127)/(60-73) 118/66     Weight: 63.5 kg (140 lb)  Body mass index is 21.29 kg/m².    Intake/Output Summary (Last 24 hours) at 8/20/2020 5194  Last data filed at 8/20/2020 0532  Gross per 24 hour   Intake 1655 ml   Output 1500 ml   Net 155 ml      Physical Exam  Constitutional:       Appearance: Normal appearance. He is ill-appearing and  "diaphoretic.   Eyes:      Conjunctiva/sclera: Conjunctivae normal.      Pupils: Pupils are equal, round, and reactive to light.   Cardiovascular:      Rate and Rhythm: Normal rate and regular rhythm.      Pulses: Normal pulses.      Heart sounds: Normal heart sounds.   Pulmonary:      Effort: Pulmonary effort is normal. No respiratory distress.      Breath sounds: Normal breath sounds.   Abdominal:      General: Bowel sounds are normal. There is no distension.      Palpations: Abdomen is soft.      Tenderness: There is no abdominal tenderness.   Musculoskeletal:         General: Swelling and tenderness present.      Comments: Dressings in place to L shoulder, R knee, R ankle.    Skin:     General: Skin is warm.      Findings: No bruising or rash.   Neurological:      Mental Status: He is alert and oriented to person, place, and time.         Significant Labs:   BMP:   Recent Labs   Lab 08/20/20  0454   *   *   K 4.1      CO2 28   BUN 7   CREATININE 0.7   CALCIUM 8.7   MG 2.1     CBC:   Recent Labs   Lab 08/19/20  1140 08/20/20  0454   WBC 9.05 11.07   HGB 13.7* 11.7*   HCT 40.2 34.7*   * 154       Significant Imaging: I have reviewed all pertinent imaging results/findings within the past 24 hours.      Assessment/Plan:      * SIRS (systemic inflammatory response syndrome)  24 year old M with a PMHx of IV drug abuse and depression presented to the ED complaining of left shoulder pain, right knee pain, and right ankle pain. On arrival temp 101.4, pulse 111, elevated CRP and Sed rate. WBC wnl. He has significant decreased ROM of left shoulder and swelling of right knee. Infectious workup started in the ED. Vanc and zosyn given in the ED.    Xray left shoulder, R knee, R ankle, CXR, and US right lower leg: no significant findings    POD 1: arthroscopy R knee and L shoulder, arthrotomy R ankle -  per ortho op note "I think this is either a very early case of septic arthritis of multiple joints " "versus inflammatory poly arthritis. Given his febrile state and IV drug abuse I think he should be treated as though the septic arthritis even if his cultures fail to grow anything."    Plan:   -- Ortho consulted for septic arthritis r/o. Follwing recs  -- Aspiration of joints: R knee (31K WBC, 90% segs) R ankle (600 WBC, 43% segs), L shoulder clotted  -- Fluid aspiration gram stain pending  -- Continue Rocephin and Vanc  -- ID consulted. Appreciate recs  -- Blood cultures NGTD  -- Chlamydia and gonnorhea DNA, RPR, HIV, hepatitis panel pending   -- Procalcitonin 0.67        Oligoarthritis of multiple sites - L shoulder, R knee, R ankle  IV drug user presented to the ED complaining of left shoulder pain, right knee pain, and right ankle pain. On arrival temp 101.4, pulse 111, elevated CRP and Sed rate. WBC wnl. He has significant decreased ROM of left shoulder and swelling of right knee. He states his pain has been severe, leading him unable to walk for 2 days.    Xray left shoulder, R knee, R ankle, CXR, and US right lower leg: no significant findings    Plan:   -- Ortho consulted. Following recs. See SIRS  -- Colchicine and Indomethacin d/eduardo  -- Oxycodone 10 mg q4h PRN  -- Dilaudid 1 mg q6h PRN  -- Scheduled Tylenol 650 mg TID      IVDU (intravenous drug user)  Pt reports use of IV drugs since the age of 17. States he has consistently used them for 5 years and became sober 6 months ago. He is on Subox-one at home which he last took 3 days ago. He relapsed last week. Last IV opioid use was 3 days ago. Patient has a 1 week old  at home that could be contributing to the relapse. On exam he was tremulous with mildly dilated pupils. He reports recent sweating and cold chills. He denies N/V diarrhea. With his chronic use, he will likely be hyper-analgesic.     Plan:  -- Clinical Opiate Withdrawal Scale: 6  -- Ativan 0.5mg PRN  -- Psych consulted for addiction management. Following recs. See opioid dependence.  -- " Continue to monitor for signs of withdrawl      Opioid use disorder, severe, dependence  Pt reports IV heroin use since the age of 17. He has consistantly used for the past 5 years. He states he was recently 6 months sober but relapsed 1 week ago. He would like to resume his Subox-one while inpatient, however he must be off opioids for > 20 hours per psych.      Plan:   -- Psych consulted for opioid withdrawal recs. Following.  -- Clonidine 0.1mg PO q4hr PRN for withdrawal associated HTN  -- Bentyl 10mg PO q6hr PRN for abdominal muscle cramps  -- Loperamide 2mg PO q6hr PRN for diarrhea  -- Robaxin 500mg PO q6hr PRN for muscle spasm  -- Zofran 4mg ODT q8hr PRN for nausea  -- Vistaril 50mg PO qHS PRN for insomnia    Anxiety and depression  Pt reports hx of anxiety/depression on Abilify and Prozac at home. He states he takes Prozac 40 mg daily and Abilify 20 mg daily which is different than how is documented in the chart.     Plan:  -- Psych consulted for medication recs. Following  -- Abilify 5 mg daily  -- Prozac 10 mg daily    Elevated C-reactive protein (CRP)  See SIRS      Elevated erythrocyte sedimentation rate  See SIRS        VTE Risk Mitigation (From admission, onward)         Ordered     Place sequential compression device  Until discontinued      08/19/20 1643     IP VTE LOW RISK PATIENT  Once      08/19/20 1643                Discharge Planning   TITA: 8/24/2020     Code Status: Full Code   Is the patient medically ready for discharge?:     Reason for patient still in hospital (select all that apply): Treatment  Discharge Plan A: Home                  Daly Hale MD  Department of Hospital Medicine   [facility]

## 2020-08-20 NOTE — ASSESSMENT & PLAN NOTE
Pt reports use of IV drugs since the age of 17. States he has consistently used them for 5 years and became sober 6 months ago. He is on Subox-one at home which he last took 3 days ago. He relapsed last week. Last IV opioid use was 3 days ago. Patient has a 1 week old  at home that could be contributing to the relapse. On exam he was tremulous with mildly dilated pupils. He reports recent sweating and cold chills. He denies N/V diarrhea. With his chronic use, he will likely be hyper-analgesic.     Plan:  -- Clinical Opiate Withdrawal Scale: 6  -- Ativan 0.5mg PRN  -- Psych consulted for addiction management. Following recs. See opioid dependence.  -- Continue to monitor for signs of withdrawl

## 2020-08-20 NOTE — ASSESSMENT & PLAN NOTE
Pt reports IV heroin use since the age of 17. He has consistantly used for the past 5 years. He states he was recently 6 months sober but relapsed 1 week ago. He would like to resume his Subox-one while inpatient, however he must be off opioids for > 20 hours per psych.      Plan:   -- Psych consulted for opioid withdrawal recs. Following.  -- Clonidine 0.1mg PO q4hr PRN for withdrawal associated HTN  -- Bentyl 10mg PO q6hr PRN for abdominal muscle cramps  -- Loperamide 2mg PO q6hr PRN for diarrhea  -- Robaxin 500mg PO q6hr PRN for muscle spasm  -- Zofran 4mg ODT q8hr PRN for nausea  -- Vistaril 50mg PO qHS PRN for insomnia

## 2020-08-20 NOTE — ANESTHESIA PROCEDURE NOTES
Intubation  Performed by: James Brown CRNA  Authorized by: Cristi Van MD     Intubation:     Induction:  Intravenous    Intubated:  Postinduction    Mask Ventilation:  Easy mask    Attempts:  1    Attempted By:  CRNA    Method of Intubation:  Direct    Blade:  Jules 2    Laryngeal View Grade: Grade I - full view of chords      Difficult Airway Encountered?: No      Complications:  None    Airway Device:  Oral endotracheal tube    Airway Device Size:  7.5    Style/Cuff Inflation:  Cuffed    Inflation Amount (mL):  7    Tube secured:  22    Secured at:  The lips    Placement Verified By:  Capnometry    Complicating Factors:  None    Findings Post-Intubation:  BS equal bilateral

## 2020-08-20 NOTE — ASSESSMENT & PLAN NOTE
IV drug user presented to the ED complaining of left shoulder pain, right knee pain, and right ankle pain. On arrival temp 101.4, pulse 111, elevated CRP and Sed rate. WBC wnl. He has significant decreased ROM of left shoulder and swelling of right knee. He states his pain has been severe, leading him unable to walk for 2 days.    Xray left shoulder, R knee, R ankle, CXR, and US right lower leg: no significant findings    Plan:   -- Ortho consulted. Following recs. See SIRS  -- Colchicine and Indomethacin d/eduardo  -- Oxycodone 10 mg q4h PRN  -- Dilaudid 1 mg q6h PRN  -- Scheduled Tylenol 650 mg TID

## 2020-08-20 NOTE — HPI
Bebo Koroma is a 24 y.o. male with PMH IVDU and DM on insulin who presents with fevers, left shoulder pain, right knee pain and swelling, and right ankle pain.  He last use IV drugs 3 days ago.  He injects mostly into his left AC fossa.  He endorses fevers at home to 102.  He states that over the past day that his pain in his left shoulder, right knee, and right ankle have become severe enough to the point that he cannot move his left shoulder and cannot ambulate on his right leg.  He has never had any history of septic arthritis in any joints.  He has never had any surgeries and never injured these joints before.  He does not take any anticoagulation baseline.

## 2020-08-20 NOTE — HPI
23 y/o male with IVDU presents with polyarthritis and fevers at home. He developed right ankle, right knee and left shoulder pain 3 days ago with associated fever, chills, fatigue, and night sweats. Denies n/v/ and chest pain. He last IV drug use was 3 days ago and inject to left arm.     tmax 101.4 in ED with elevated inflammatory markers. started on vancomycin and zosyn in ED. He underwent washout with orthopedics yesterday.     R Knee Joint Fluid Analysis:  WBC: 31k cells  Segs: 90%  Crystals: negative  Gram Stain: negative  Cultures pending     R Ankle Joint Fluid Analysis:  WBC: 600 cells  Segs: 43%  Crystals: negative  Gram Stain: negative  Cultures pending     L Shoulder Joint Fluid Analysis:  WBC: clotted  Segs: clotted  Crystals: negative  Gram Stain: negative  Cultures pending    Per op note, murky fluid was encountered from joint spaces. Cultures pending.

## 2020-08-20 NOTE — ANESTHESIA PREPROCEDURE EVALUATION
[facility]  Anesthesia Pre-Operative Evaluation         Patient Name: Bebo Koroma  YOB: 1995  MRN: [id number]    SUBJECTIVE:     Pre-operative evaluation for Procedure(s) (LRB):  ARTHROSCOPY, KNEE - cultures & 9 liters saline - arthroscopic leg lozada  (Right)  ARTHROSCOPY, SHOULDER - cultures & 9 liters saline (Left)  ARTHROTOMY, ANKLE - cultures & 9 liters of saline - cysto tubing (Right)     08/19/2020    Bebo Koroma is a 24 y.o. male w/ a significant PMHx of IVDU (Heroin). Admitted for septic arthritis of multiple joints.    HDS on room air. NPO >12hrs. Patient now presents for the above procedure(s).      LDA:       Peripheral IV - Single Lumen 08/19/20 1141 18 G Right Antecubital (Active)   Number of days: 0       Prev airway: None documented.    Drips: None documented.      Patient Active Problem List   Diagnosis    SIRS (systemic inflammatory response syndrome)    Elevated erythrocyte sedimentation rate    Elevated C-reactive protein (CRP)    IVDU (intravenous drug user)    Oligoarthritis of multiple sites - L shoulder, R knee, R ankle    Anxiety and depression    Septic arthritis of knee, right    Septic arthritis of shoulder, left    Septic arthritis of right ankle       Review of patient's allergies indicates:   Allergen Reactions    Penicillins Hives     Unclear of last episode of allergic reaction       Current Inpatient Medications:   colchicine  0.6 mg Oral BID    dicyclomine  10 mg Oral TID    indomethacin  50 mg Oral TID WM    pantoprazole  40 mg Oral Daily       No current facility-administered medications on file prior to encounter.      Current Outpatient Medications on File Prior to Encounter   Medication Sig Dispense Refill    ARIPiprazole (ABILIFY) 10 MG Tab Take 5 mg by mouth once daily.      buprenorphine-naloxone (SUBOXONE) 8-2 mg Film Place under the tongue.      FLUoxetine 10 MG capsule Take 10 mg by mouth once daily.          History reviewed. No pertinent surgical history.    Social History     Socioeconomic History    Marital status: Single     Spouse name: Not on file    Number of children: Not on file    Years of education: Not on file    Highest education level: Not on file   Occupational History    Not on file   Social Needs    Financial resource strain: Not on file    Food insecurity     Worry: Not on file     Inability: Not on file    Transportation needs     Medical: Not on file     Non-medical: Not on file   Tobacco Use    Smoking status: Current Every Day Smoker     Packs/day: 1.00     Years: 5.00     Pack years: 5.00     Types: Cigarettes    Smokeless tobacco: Never Used   Substance and Sexual Activity    Alcohol use: Not Currently    Drug use: Yes     Types: Opium    Sexual activity: Yes     Partners: Female     Birth control/protection: None   Lifestyle    Physical activity     Days per week: Not on file     Minutes per session: Not on file    Stress: Not on file   Relationships    Social connections     Talks on phone: Not on file     Gets together: Not on file     Attends Rastafarian service: Not on file     Active member of club or organization: Not on file     Attends meetings of clubs or organizations: Not on file     Relationship status: Not on file   Other Topics Concern    Not on file   Social History Narrative    Not on file       OBJECTIVE:     Vital Signs Range (Last 24H):  Temp:  [36.9 °C (98.4 °F)-38.6 °C (101.4 °F)]   Pulse:  []   Resp:  [16-20]   BP: (108-138)/(61-76)   SpO2:  [95 %-99 %]       Significant Labs:  Lab Results   Component Value Date    WBC 9.05 08/19/2020    HGB 13.7 (L) 08/19/2020    HCT 40.2 08/19/2020     (L) 08/19/2020    ALT 24 08/19/2020    AST 20 08/19/2020     (L) 08/19/2020    K 3.9 08/19/2020    CL 97 08/19/2020    CREATININE 0.8 08/19/2020    BUN 9 08/19/2020    CO2 27 08/19/2020       Diagnostic Studies: No relevant studies.    EKG:   Results  for orders placed or performed during the hospital encounter of 08/19/20   EKG 12-lead    Collection Time: 08/19/20 11:55 AM    Narrative    Test Reason : R50.9,    Vent. Rate : 108 BPM     Atrial Rate : 108 BPM     P-R Int : 132 ms          QRS Dur : 084 ms      QT Int : 310 ms       P-R-T Axes : 046 101 022 degrees     QTc Int : 415 ms    Sinus tachycardia  Rightward axis  Abnormal ECG  No previous ECGs available  Reconfirmed by Robert SORTO MD ([id number]) on 8/19/2020 3:32:42 PM    Referred By: RHINA   SELF           Confirmed By:Robert SOROT MD       2D ECHO:  TTE:  No results found for this or any previous visit.    KUMAR:  No results found for this or any previous visit.    ASSESSMENT/PLAN:         Anesthesia Evaluation    I have reviewed the Patient Summary Reports.    I have reviewed the Nursing Notes. I have reviewed the NPO Status.   I have reviewed the Medications.     Review of Systems  Anesthesia Hx:  No previous Anesthesia  Neg history of prior surgery. Denies Family Hx of Anesthesia complications.    Social:  Smoker    Hematology/Oncology:  Hematology Normal        Cardiovascular:  Cardiovascular Normal     Pulmonary:  Pulmonary Normal    Renal/:  Renal/ Normal     Hepatic/GI:  Hepatic/GI Normal    Musculoskeletal:  Musculoskeletal Normal    Neurological:  Neurology Normal    Endocrine:  Endocrine Normal        Physical Exam  General:  Well nourished    Airway/Jaw/Neck:  Airway Findings: Mouth Opening: Normal Tongue: Normal  General Airway Assessment: Adult  Mallampati: I  Jaw/Neck Findings:  Neck ROM: Normal ROM      Dental:  Dental Findings: In tact   Chest/Lungs:  Chest/Lungs Findings: Normal Respiratory Rate     Heart/Vascular:  Heart Findings: Rate: Normal  Rhythm: Regular Rhythm        Mental Status:  Mental Status Findings:  Cooperative, Alert and Oriented         Anesthesia Plan  Type of Anesthesia, risks & benefits discussed:  Anesthesia Type:  general  Patient's Preference:   Intra-op  Monitoring Plan: standard ASA monitors  Intra-op Monitoring Plan Comments:   Post Op Pain Control Plan: per primary service following discharge from PACU, IV/PO Opioids PRN and multimodal analgesia  Post Op Pain Control Plan Comments:   Induction:   IV  Beta Blocker:  Patient is not currently on a Beta-Blocker (No further documentation required).       Informed Consent: Patient understands risks and agrees with Anesthesia plan.  Questions answered. Anesthesia consent signed with patient.  ASA Score: 3  emergent   Day of Surgery Review of History & Physical:    H&P update referred to the surgeon.         Ready For Surgery From Anesthesia Perspective.

## 2020-08-20 NOTE — SUBJECTIVE & OBJECTIVE
"     OBJECTIVE:      EXAMINATION     Vitals          Vitals:     08/20/20 0206 08/20/20 0217 08/20/20 0741 08/20/20 0743   BP: 115/60     123/65   BP Location:       Right arm   Patient Position:       Lying   Pulse:       82   Resp:   18 16 18   Temp: 98.1 °F (36.7 °C)     97 °F (36.1 °C)   TempSrc:       Axillary   SpO2:       97%   Weight:           Height:                   PAIN   *10/10     CONSTITUTIONAL  General Appearance and Manner: uncomfortable     MUSCULOSKELETAL  Abnormal Involuntary Movements: no     PSYCHIATRIC   Orientation: AAOx3  Speech: normal rate, tone, volume  Language: normal, fluid,  no prosody   Mood: "bad"  Affect: tearful  Thought Process: Linear, goal oriented, organized  Associations: intact, logical  Thought Content: No SI/HI/AVH  Memory: 3/3, 3/3  Attention and Concentration: Intact to conversation  Fund of Knowledge: Intact to conversation  Insight: fair  Judgment: appropriate for setting        "

## 2020-08-20 NOTE — CONSULTS
[facility]  Orthopedics  Consult Note    Patient Name: Bebo Koroma  MRN: [id number]  Admission Date: 8/19/2020  Hospital Length of Stay: 0 days  Attending Provider: Cuauhtemoc Osborne MD  Primary Care Provider: Primary Doctor No        Inpatient consult to Orthopedics  Consult performed by: Elliott Dickens MD  Consult ordered by: Daly Hale MD        Subjective:     Principal Problem:SIRS (systemic inflammatory response syndrome)    Chief Complaint:   Chief Complaint   Patient presents with    Fever     left shoulder, right ankle, right knee pain. x 3-4 days. no trauma. no cough. no fever. denies sob. denies sick contacts.     Joint Pain        HPI: Bebo Koroma is a 24 y.o. male with PMH IVDU and DM on insulin who presents with fevers, left shoulder pain, right knee pain and swelling, and right ankle pain.  He last use IV drugs 3 days ago.  He injects mostly into his left AC fossa.  He endorses fevers at home to 102.  He states that over the past day that his pain in his left shoulder, right knee, and right ankle have become severe enough to the point that he cannot move his left shoulder and cannot ambulate on his right leg.  He has never had any history of septic arthritis in any joints.  He has never had any surgeries and never injured these joints before.  He does not take any anticoagulation baseline.    Past Medical History:   Diagnosis Date    IVDU (intravenous drug user) 8/19/2020       History reviewed. No pertinent surgical history.    Review of patient's allergies indicates:   Allergen Reactions    Penicillins Hives     Unclear of last episode of allergic reaction       Current Facility-Administered Medications   Medication    colchicine capsule/tablet 0.6 mg    dextrose 50% injection 12.5 g    dextrose 50% injection 25 g    diazePAM tablet 5 mg    dicyclomine capsule 10 mg    glucagon (human recombinant) injection 1 mg    glucose chewable tablet 16 g     "glucose chewable tablet 24 g    indomethacin capsule 50 mg    melatonin tablet 6 mg    ondansetron disintegrating tablet 8 mg    oxyCODONE immediate release tablet 5 mg    pantoprazole EC tablet 40 mg    prochlorperazine tablet 10 mg    senna-docusate 8.6-50 mg per tablet 1 tablet    sodium chloride 0.9% flush 10 mL     Family History     None        Tobacco Use    Smoking status: Current Every Day Smoker     Packs/day: 1.00     Years: 5.00     Pack years: 5.00     Types: Cigarettes    Smokeless tobacco: Never Used   Substance and Sexual Activity    Alcohol use: Not Currently    Drug use: Yes     Types: Opium    Sexual activity: Yes     Partners: Female     Birth control/protection: None     ROS   Per primary team note   Objective:     Vital Signs (Most Recent):  Temp: 98.4 °F (36.9 °C) (08/19/20 1940)  Pulse: 103 (08/19/20 1940)  Resp: 20 (08/19/20 1940)  BP: 127/63 (08/19/20 1940)  SpO2: 99 % (08/19/20 1940) Vital Signs (24h Range):  Temp:  [98.4 °F (36.9 °C)-101.4 °F (38.6 °C)] 98.4 °F (36.9 °C)  Pulse:  [] 103  Resp:  [16-20] 20  SpO2:  [95 %-99 %] 99 %  BP: (108-138)/(61-76) 127/63     Weight: 63.5 kg (140 lb)  Height: 5' 8" (172.7 cm)  Body mass index is 21.29 kg/m².      Ortho/SPM Exam     LUE:  Track marks in AC fossa without surrounding erythema or drainage  No ecchymosis, erythema, or signs of cellulitis  No TTP at proximal, middle, or distal aspects of humerus, radius, or ulna  Full painless ROM of elbow and wrist  Pain is localized to the anterior shoulder  Range of motion not able to be assessed due to pain  Sensation is intact to light touch throughout   2+ Radial pulse  Brisk capillary refill    RLE:  Skin intact throughout, no scars present  Effusion around the knee and suprapatellar region as well as the ankle  No ecchymosis, erythema, or signs of cellulitis  TTP at all aspects of the knee and ankle  Pain with any range of motion of the knee and ankle  No pain with range of motion " of the hip  No tenderness to palpation of proximal, middle, or distal aspects of femur, tibia, or fibula  No tenderness to palpation of foot  Compartments soft  SILT Sa/Bell/DP/SP/T  Motor intact EHL/FHL/TA/Gastroc  2+ DP  Brisk capillary refill           Significant Labs:   CBC:   Recent Labs   Lab 20  1140   WBC 9.05   HGB 13.7*   HCT 40.2   *     CMP:   Recent Labs   Lab 20  1140   *   K 3.9   CL 97   CO2 27   GLU 92   BUN 9   CREATININE 0.8   CALCIUM 9.5   PROT 7.9   ALBUMIN 3.6   BILITOT 0.9   ALKPHOS 110   AST 20   ALT 24   ANIONGAP 10   EGFRNONAA >60.0     Recent Labs   Lab 20  1140   WBC 9.05   SEDRATE 66*   .6*          Significant Imaging: I have reviewed all pertinent imaging results/findings.   X-ray of the left shoulder, right knee, right ankle showing no fractures or other bony abnormalities    Procedure Note:  Right knee, right ankle, left shoulder aspiration  Patient was explained risks, benefits, and alternatives to treatment and verbalized consent to proceed. Time out was performed and patient name, , site, and procedure were confirmed. Skin was sterilely prepared with alcohol solution at all sites. 18 gauge needle on a 60 cc syringe was used for aspiration through anterior shoulder, superior lateral knee, and anterior ankle approaches. 50 cc of yellow colored fluid collected from the knee, for cc of yellow colored fluid collected from the ankle, 1 cc yellow/red fluid collected from the shoulder. Fluid and cultures were obtained and sent for analysis. Aspiration site was covered with a bandaid.    R Knee Joint Fluid Analysis:  WBC: 31k cells  Segs: 90%  Crystals: negative  Gram Stain: negative  Cultures pending    R Ankle Joint Fluid Analysis:  WBC: 600 cells  Segs: 43%  Crystals: negative  Gram Stain: negative  Cultures pending    L Shoulder Joint Fluid Analysis:  WBC: clotted  Segs: clotted  Crystals: negative  Gram Stain: negative  Cultures  pending        Assessment/Plan:     Septic arthritis of knee, right  Bebo Koroma is a 24 y.o. male IVDU presenting with fevers, left shoulder pain, right knee pain, right ankle pain.  Right knee, right ankle, and right shoulder aspirated at bedside and fluid sent for analysis which returned suggestive of infection.  Blood cultures pending.  Explained to patient the severity of his infection and reviewed all surgical and nonsurgical options with him.  He states his understanding.  He wishes to proceed with surgery at this time.  He has marked, booked, and consented for surgery tonight.  He has been NPO since 9:00 a.m.           Elliott Dickens MD  Orthopedics  [facility]

## 2020-08-20 NOTE — ASSESSMENT & PLAN NOTE
"24 year old M with a PMHx of IV drug abuse and depression presented to the ED complaining of left shoulder pain, right knee pain, and right ankle pain. On arrival temp 101.4, pulse 111, elevated CRP and Sed rate. WBC wnl. He has significant decreased ROM of left shoulder and swelling of right knee. Infectious workup started in the ED. Vanc and zosyn given in the ED.    Xray left shoulder, R knee, R ankle, CXR, and US right lower leg: no significant findings    POD 1: arthroscopy R knee and L shoulder, arthrotomy R ankle -  per ortho op note "I think this is either a very early case of septic arthritis of multiple joints versus inflammatory poly arthritis. Given his febrile state and IV drug abuse I think he should be treated as though the septic arthritis even if his cultures fail to grow anything."    Plan:   -- Ortho consulted for septic arthritis r/o. Follwing recs  -- Aspiration of joints: R knee (31K WBC, 90% segs) R ankle (600 WBC, 43% segs), L shoulder clotted  -- Fluid aspiration gram stain pending  -- Continue Rocephin and Vanc  -- ID consulted. Appreciate recs  -- Blood cultures NGTD  -- Chlamydia and gonnorhea DNA, RPR, HIV, hepatitis panel pending   -- Procalcitonin 0.67      "

## 2020-08-21 LAB
ALBUMIN SERPL BCP-MCNC: 3 G/DL (ref 3.5–5.2)
ALP SERPL-CCNC: 164 U/L (ref 55–135)
ALT SERPL W/O P-5'-P-CCNC: 16 U/L (ref 10–44)
ANION GAP SERPL CALC-SCNC: 7 MMOL/L (ref 8–16)
AST SERPL-CCNC: 16 U/L (ref 10–40)
BASOPHILS # BLD AUTO: 0.03 K/UL (ref 0–0.2)
BASOPHILS NFR BLD: 0.2 % (ref 0–1.9)
BILIRUB SERPL-MCNC: 0.4 MG/DL (ref 0.1–1)
BUN SERPL-MCNC: 15 MG/DL (ref 6–20)
CALCIUM SERPL-MCNC: 8.9 MG/DL (ref 8.7–10.5)
CHLORIDE SERPL-SCNC: 102 MMOL/L (ref 95–110)
CO2 SERPL-SCNC: 29 MMOL/L (ref 23–29)
CREAT SERPL-MCNC: 0.6 MG/DL (ref 0.5–1.4)
CRP SERPL-MCNC: 120.6 MG/L (ref 0–8.2)
DIFFERENTIAL METHOD: ABNORMAL
EOSINOPHIL # BLD AUTO: 0 K/UL (ref 0–0.5)
EOSINOPHIL NFR BLD: 0.1 % (ref 0–8)
ERYTHROCYTE [DISTWIDTH] IN BLOOD BY AUTOMATED COUNT: 12.6 % (ref 11.5–14.5)
ERYTHROCYTE [SEDIMENTATION RATE] IN BLOOD BY WESTERGREN METHOD: 90 MM/HR (ref 0–23)
EST. GFR  (AFRICAN AMERICAN): >60 ML/MIN/1.73 M^2
EST. GFR  (NON AFRICAN AMERICAN): >60 ML/MIN/1.73 M^2
GLUCOSE SERPL-MCNC: 106 MG/DL (ref 70–110)
HCT VFR BLD AUTO: 34.2 % (ref 40–54)
HGB BLD-MCNC: 11.4 G/DL (ref 14–18)
IMM GRANULOCYTES # BLD AUTO: 0.17 K/UL (ref 0–0.04)
IMM GRANULOCYTES NFR BLD AUTO: 1.2 % (ref 0–0.5)
LYMPHOCYTES # BLD AUTO: 1.6 K/UL (ref 1–4.8)
LYMPHOCYTES NFR BLD: 11.9 % (ref 18–48)
MAGNESIUM SERPL-MCNC: 2.2 MG/DL (ref 1.6–2.6)
MCH RBC QN AUTO: 30.4 PG (ref 27–31)
MCHC RBC AUTO-ENTMCNC: 33.3 G/DL (ref 32–36)
MCV RBC AUTO: 91 FL (ref 82–98)
MONOCYTES # BLD AUTO: 1.3 K/UL (ref 0.3–1)
MONOCYTES NFR BLD: 9.4 % (ref 4–15)
NEUTROPHILS # BLD AUTO: 10.6 K/UL (ref 1.8–7.7)
NEUTROPHILS NFR BLD: 77.2 % (ref 38–73)
NRBC BLD-RTO: 0 /100 WBC
PHOSPHATE SERPL-MCNC: 3.4 MG/DL (ref 2.7–4.5)
PLATELET # BLD AUTO: 240 K/UL (ref 150–350)
PMV BLD AUTO: 11.6 FL (ref 9.2–12.9)
POTASSIUM SERPL-SCNC: 4.1 MMOL/L (ref 3.5–5.1)
PROT SERPL-MCNC: 7.1 G/DL (ref 6–8.4)
RBC # BLD AUTO: 3.75 M/UL (ref 4.6–6.2)
SODIUM SERPL-SCNC: 138 MMOL/L (ref 136–145)
VANCOMYCIN TROUGH SERPL-MCNC: 5.7 UG/ML (ref 10–22)
WBC # BLD AUTO: 13.77 K/UL (ref 3.9–12.7)

## 2020-08-21 PROCEDURE — 63600175 PHARM REV CODE 636 W HCPCS: Performed by: STUDENT IN AN ORGANIZED HEALTH CARE EDUCATION/TRAINING PROGRAM

## 2020-08-21 PROCEDURE — 86140 C-REACTIVE PROTEIN: CPT

## 2020-08-21 PROCEDURE — 99232 SBSQ HOSP IP/OBS MODERATE 35: CPT | Mod: ,,, | Performed by: PSYCHIATRY & NEUROLOGY

## 2020-08-21 PROCEDURE — 63600175 PHARM REV CODE 636 W HCPCS: Performed by: INTERNAL MEDICINE

## 2020-08-21 PROCEDURE — 25000003 PHARM REV CODE 250: Performed by: INTERNAL MEDICINE

## 2020-08-21 PROCEDURE — 83735 ASSAY OF MAGNESIUM: CPT

## 2020-08-21 PROCEDURE — 80053 COMPREHEN METABOLIC PANEL: CPT

## 2020-08-21 PROCEDURE — 97165 OT EVAL LOW COMPLEX 30 MIN: CPT

## 2020-08-21 PROCEDURE — 97162 PT EVAL MOD COMPLEX 30 MIN: CPT

## 2020-08-21 PROCEDURE — 85025 COMPLETE CBC W/AUTO DIFF WBC: CPT

## 2020-08-21 PROCEDURE — 99233 SBSQ HOSP IP/OBS HIGH 50: CPT | Mod: ,,, | Performed by: PHYSICIAN ASSISTANT

## 2020-08-21 PROCEDURE — 25000003 PHARM REV CODE 250: Performed by: STUDENT IN AN ORGANIZED HEALTH CARE EDUCATION/TRAINING PROGRAM

## 2020-08-21 PROCEDURE — 99232 PR SUBSEQUENT HOSPITAL CARE,LEVL II: ICD-10-PCS | Mod: ,,, | Performed by: INTERNAL MEDICINE

## 2020-08-21 PROCEDURE — 80202 ASSAY OF VANCOMYCIN: CPT

## 2020-08-21 PROCEDURE — 97535 SELF CARE MNGMENT TRAINING: CPT

## 2020-08-21 PROCEDURE — 11000001 HC ACUTE MED/SURG PRIVATE ROOM

## 2020-08-21 PROCEDURE — 25000003 PHARM REV CODE 250: Performed by: ORTHOPAEDIC SURGERY

## 2020-08-21 PROCEDURE — 99233 PR SUBSEQUENT HOSPITAL CARE,LEVL III: ICD-10-PCS | Mod: ,,, | Performed by: PHYSICIAN ASSISTANT

## 2020-08-21 PROCEDURE — 84100 ASSAY OF PHOSPHORUS: CPT

## 2020-08-21 PROCEDURE — 85652 RBC SED RATE AUTOMATED: CPT

## 2020-08-21 PROCEDURE — 99232 SBSQ HOSP IP/OBS MODERATE 35: CPT | Mod: ,,, | Performed by: INTERNAL MEDICINE

## 2020-08-21 PROCEDURE — 99232 PR SUBSEQUENT HOSPITAL CARE,LEVL II: ICD-10-PCS | Mod: ,,, | Performed by: PSYCHIATRY & NEUROLOGY

## 2020-08-21 RX ORDER — HYDROMORPHONE HYDROCHLORIDE 1 MG/ML
1 INJECTION, SOLUTION INTRAMUSCULAR; INTRAVENOUS; SUBCUTANEOUS EVERY 4 HOURS PRN
Status: DISCONTINUED | OUTPATIENT
Start: 2020-08-21 | End: 2020-08-21

## 2020-08-21 RX ORDER — HYDROMORPHONE HYDROCHLORIDE 1 MG/ML
1 INJECTION, SOLUTION INTRAMUSCULAR; INTRAVENOUS; SUBCUTANEOUS ONCE
Status: COMPLETED | OUTPATIENT
Start: 2020-08-21 | End: 2020-08-21

## 2020-08-21 RX ORDER — IBUPROFEN 400 MG/1
800 TABLET ORAL 3 TIMES DAILY
Status: DISCONTINUED | OUTPATIENT
Start: 2020-08-21 | End: 2020-09-01 | Stop reason: HOSPADM

## 2020-08-21 RX ORDER — METHOCARBAMOL 750 MG/1
750 TABLET, FILM COATED ORAL EVERY 6 HOURS
Status: DISCONTINUED | OUTPATIENT
Start: 2020-08-22 | End: 2020-09-01 | Stop reason: HOSPADM

## 2020-08-21 RX ORDER — VANCOMYCIN HCL IN 5 % DEXTROSE 1G/250ML
1000 PLASTIC BAG, INJECTION (ML) INTRAVENOUS EVERY 8 HOURS
Status: DISCONTINUED | OUTPATIENT
Start: 2020-08-21 | End: 2020-08-22

## 2020-08-21 RX ORDER — NALOXONE HCL 0.4 MG/ML
0.4 VIAL (ML) INJECTION
Status: DISCONTINUED | OUTPATIENT
Start: 2020-08-21 | End: 2020-09-01 | Stop reason: HOSPADM

## 2020-08-21 RX ORDER — AMITRIPTYLINE HYDROCHLORIDE 25 MG/1
25 TABLET, FILM COATED ORAL NIGHTLY
Status: DISCONTINUED | OUTPATIENT
Start: 2020-08-21 | End: 2020-09-01 | Stop reason: HOSPADM

## 2020-08-21 RX ORDER — HYDROMORPHONE HYDROCHLORIDE 1 MG/ML
1 INJECTION, SOLUTION INTRAMUSCULAR; INTRAVENOUS; SUBCUTANEOUS
Status: DISCONTINUED | OUTPATIENT
Start: 2020-08-21 | End: 2020-08-23

## 2020-08-21 RX ORDER — GABAPENTIN 300 MG/1
300 CAPSULE ORAL 2 TIMES DAILY
Status: DISCONTINUED | OUTPATIENT
Start: 2020-08-21 | End: 2020-09-01 | Stop reason: HOSPADM

## 2020-08-21 RX ORDER — HYDROMORPHONE HYDROCHLORIDE 1 MG/ML
1 INJECTION, SOLUTION INTRAMUSCULAR; INTRAVENOUS; SUBCUTANEOUS
Status: DISCONTINUED | OUTPATIENT
Start: 2020-08-21 | End: 2020-08-21

## 2020-08-21 RX ADMIN — ARIPIPRAZOLE 5 MG: 5 TABLET ORAL at 09:08

## 2020-08-21 RX ADMIN — IBUPROFEN 800 MG: 400 TABLET, FILM COATED ORAL at 08:08

## 2020-08-21 RX ADMIN — ACETAMINOPHEN 650 MG: 325 TABLET ORAL at 08:08

## 2020-08-21 RX ADMIN — VANCOMYCIN HYDROCHLORIDE 1000 MG: 1 INJECTION, POWDER, LYOPHILIZED, FOR SOLUTION INTRAVENOUS at 05:08

## 2020-08-21 RX ADMIN — HYDROMORPHONE HYDROCHLORIDE 1 MG: 1 INJECTION, SOLUTION INTRAMUSCULAR; INTRAVENOUS; SUBCUTANEOUS at 04:08

## 2020-08-21 RX ADMIN — CEFTRIAXONE 2 G: 2 INJECTION, SOLUTION INTRAVENOUS at 08:08

## 2020-08-21 RX ADMIN — HYDROMORPHONE HYDROCHLORIDE 1 MG: 1 INJECTION, SOLUTION INTRAMUSCULAR; INTRAVENOUS; SUBCUTANEOUS at 05:08

## 2020-08-21 RX ADMIN — ASPIRIN 81 MG: 81 TABLET, COATED ORAL at 09:08

## 2020-08-21 RX ADMIN — OXYCODONE HYDROCHLORIDE 10 MG: 10 TABLET ORAL at 12:08

## 2020-08-21 RX ADMIN — VANCOMYCIN HYDROCHLORIDE 1000 MG: 1 INJECTION, POWDER, LYOPHILIZED, FOR SOLUTION INTRAVENOUS at 02:08

## 2020-08-21 RX ADMIN — HYDROMORPHONE HYDROCHLORIDE 1 MG: 1 INJECTION, SOLUTION INTRAMUSCULAR; INTRAVENOUS; SUBCUTANEOUS at 11:08

## 2020-08-21 RX ADMIN — VANCOMYCIN HYDROCHLORIDE 1000 MG: 1 INJECTION, POWDER, LYOPHILIZED, FOR SOLUTION INTRAVENOUS at 09:08

## 2020-08-21 RX ADMIN — ACETAMINOPHEN 650 MG: 325 TABLET ORAL at 02:08

## 2020-08-21 RX ADMIN — METHOCARBAMOL TABLETS 750 MG: 750 TABLET, COATED ORAL at 11:08

## 2020-08-21 RX ADMIN — HYDROMORPHONE HYDROCHLORIDE 1 MG: 1 INJECTION, SOLUTION INTRAMUSCULAR; INTRAVENOUS; SUBCUTANEOUS at 06:08

## 2020-08-21 RX ADMIN — HYDROMORPHONE HYDROCHLORIDE 1 MG: 1 INJECTION, SOLUTION INTRAMUSCULAR; INTRAVENOUS; SUBCUTANEOUS at 10:08

## 2020-08-21 RX ADMIN — AMITRIPTYLINE HYDROCHLORIDE 25 MG: 25 TABLET, FILM COATED ORAL at 08:08

## 2020-08-21 RX ADMIN — HYDROMORPHONE HYDROCHLORIDE 1 MG: 1 INJECTION, SOLUTION INTRAMUSCULAR; INTRAVENOUS; SUBCUTANEOUS at 02:08

## 2020-08-21 RX ADMIN — OXYCODONE HYDROCHLORIDE 10 MG: 10 TABLET ORAL at 07:08

## 2020-08-21 RX ADMIN — FLUOXETINE 10 MG: 10 CAPSULE ORAL at 09:08

## 2020-08-21 RX ADMIN — OXYCODONE HYDROCHLORIDE 10 MG: 10 TABLET ORAL at 08:08

## 2020-08-21 RX ADMIN — PANTOPRAZOLE SODIUM 40 MG: 40 TABLET, DELAYED RELEASE ORAL at 09:08

## 2020-08-21 RX ADMIN — GABAPENTIN 300 MG: 300 CAPSULE ORAL at 08:08

## 2020-08-21 RX ADMIN — ACETAMINOPHEN 650 MG: 325 TABLET ORAL at 09:08

## 2020-08-21 RX ADMIN — OXYCODONE HYDROCHLORIDE 10 MG: 10 TABLET ORAL at 04:08

## 2020-08-21 NOTE — PROGRESS NOTES
[facility] Hospital Medicine  Progress Note    Patient Name: Bebo Koroma  MRN: [id number]  Patient Class: IP- Inpatient   Admission Date: 8/19/2020  Length of Stay: 1 days  Attending Physician: Ronna Greene MD  Primary Care Provider: [facility] Medicine Team: [facility] 2 Daly Hale MD    Subjective:     Principal Problem:SIRS (systemic inflammatory response syndrome)        HPI:  Mr. Koroma is a 24 year old M with a PMHx of IV drug abuse and depression that presented to the ED complaining of left shoulder pain, right knee pain, and right ankle pain. Pain started when the patient woke up about 3 days ago, mostly left shoulder pain at that time. He then developed right ankle pain and right knee pain. He describes the pain as constant, sharp and worse with movement. Shoulder pain radiates down to the antecubital fossa region. He tried taking Tylenol with no relief. He has experienced significant decrease in ROM of left shoulder and states that he could not walk for 2 days due to his pain. Reports subjective fevers, chills, fatigue, general weakness, night sweats. Denies N/V, chest pain, SOB, abdominal pain, diarrhea, pain with urination. Patient reports that he last used IV opioids about 3 days ago. Injects into the left arm. On Subox-one at home but has not taken in 3 days. Denies alcohol use and other recreational drugs.     Pt arrived to ED with temp of 101.4, tachycardic elevated CRP and ESR. WBC and lactate wnl. Blood cultures done and 1 dose of vanc and zosyn given along with IVF. Xray L shoulder with no significant findings.     Overview/Hospital Course:  Pt admitted to IM 2 for further management of sepsis (source unidentified). Ortho consulted to r/o septic arthritis. Xray of right knee and ankle ordered. Psych consulted for management of addiction and medications (on Abilify, Prozac, and Subox-one at home). R knee tapped (31K WBC, 90% segs) R ankle tapped  (600 WBC, 43% seg), L shoulder tapped (clotted). Cultures pending. Taken to the OR on 8/19 for arthroscopy of knee and shoulder and arthrotomy of ankle. 8/20 pt in pain, crying, sweating with chills. Subox-one unable to be restarted due to patient being on opioids. Adjusted pain medications for better pain control. Started opioid withdrawal protocol per psych recs. Restarting Abilify and Prozac.    Interval History: POD 2. Pt states he has pain in the left shoulder/ arm today. ROM in the right nee seems to be improved. Slept throughout the night. Discussed with Ortho on rounds he possibility of another shoulder washout.    Review of Systems   Constitutional: Positive for appetite change. Negative for fever.   HENT: Negative for congestion, sore throat and trouble swallowing.    Eyes: Negative for photophobia, pain and discharge.   Respiratory: Negative for cough and shortness of breath.    Cardiovascular: Negative for chest pain and palpitations.   Gastrointestinal: Negative for abdominal pain, diarrhea, nausea and vomiting.   Genitourinary: Negative for difficulty urinating.   Musculoskeletal: Positive for arthralgias and joint swelling. Negative for back pain, myalgias and neck pain.   Skin: Negative for pallor and rash.   Neurological: Negative for light-headedness, numbness and headaches.     Objective:     Vital Signs (Most Recent):  Temp: 98.1 °F (36.7 °C) (08/21/20 1141)  Pulse: 78 (08/21/20 1141)  Resp: 20 (08/21/20 1141)  BP: 108/60 (08/21/20 1141)  SpO2: 95 % (08/21/20 1141) Vital Signs (24h Range):  Temp:  [96.5 °F (35.8 °C)-98.2 °F (36.8 °C)] 98.1 °F (36.7 °C)  Pulse:  [62-83] 78  Resp:  [16-20] 20  SpO2:  [94 %-99 %] 95 %  BP: ()/(53-67) 108/60     Weight: 63.5 kg (140 lb)  Body mass index is 21.29 kg/m².    Intake/Output Summary (Last 24 hours) at 8/21/2020 1207  Last data filed at 8/21/2020 0853  Gross per 24 hour   Intake 160 ml   Output 400 ml   Net -240 ml      Physical Exam  Constitutional:   "     Appearance: Normal appearance. He is ill-appearing.   Eyes:      Conjunctiva/sclera: Conjunctivae normal.      Pupils: Pupils are equal, round, and reactive to light.   Cardiovascular:      Rate and Rhythm: Normal rate and regular rhythm.      Pulses: Normal pulses.      Heart sounds: Normal heart sounds.   Pulmonary:      Effort: Pulmonary effort is normal. No respiratory distress.      Breath sounds: Normal breath sounds.   Abdominal:      General: Bowel sounds are normal. There is no distension.      Palpations: Abdomen is soft.      Tenderness: There is no abdominal tenderness.   Musculoskeletal:         General: Swelling and tenderness present.      Comments: Dressings in place to L shoulder, R knee, R ankle.    Skin:     General: Skin is warm.      Findings: No bruising or rash.   Neurological:      Mental Status: He is alert and oriented to person, place, and time.         Significant Labs:   BMP:   Recent Labs   Lab 08/21/20  0438         K 4.1      CO2 29   BUN 15   CREATININE 0.6   CALCIUM 8.9   MG 2.2     CBC:   Recent Labs   Lab 08/20/20  0454 08/21/20  0438   WBC 11.07 13.77*   HGB 11.7* 11.4*   HCT 34.7* 34.2*    240       Significant Imaging: I have reviewed all pertinent imaging results/findings within the past 24 hours.      Assessment/Plan:      * SIRS (systemic inflammatory response syndrome)  24 year old M with a PMHx of IV drug abuse and depression presented to the ED complaining of left shoulder pain, right knee pain, and right ankle pain. On arrival temp 101.4, pulse 111, elevated CRP and Sed rate. WBC wnl. He has significant decreased ROM of left shoulder and swelling of right knee. Infectious workup started in the ED. Vanc and zosyn given in the ED.    Xray left shoulder, R knee, R ankle, CXR, and US right lower leg: no significant findings    POD 2: arthroscopy R knee and L shoulder, arthrotomy R ankle -  per ortho op-note "I think this is either a very early " "case of septic arthritis of multiple joints versus inflammatory poly arthritis. Given his febrile state and IV drug abuse I think he should be treated as though the septic arthritis even if his cultures fail to grow anything."    Plan:   -- Ortho consulted for septic arthritis r/o. Follwing recs  -- Aspiration of joints: R knee (31K WBC, 90% segs) R ankle (600 WBC, 43% segs), L shoulder clotted  -- Fluid aspiration gram stain pending  -- Continue Rocephin and Vanc  -- ID consulted. Appreciate recs  -- Blood cultures NGTD  -- Chlamydia and gonnorhea DNA, RPR, HIV, hepatitis panel pending   -- Procalcitonin 0.67        Oligoarthritis of multiple sites - L shoulder, R knee, R ankle  IV drug user presented to the ED complaining of left shoulder pain, right knee pain, and right ankle pain. On arrival temp 101.4, pulse 111, elevated CRP and Sed rate. WBC wnl. He has significant decreased ROM of left shoulder and swelling of right knee. He states his pain has been severe, leading him unable to walk for 2 days.    Xray left shoulder, R knee, R ankle, CXR, and US right lower leg: no significant findings    Plan:   -- Ortho consulted. Following recs. See SIRS  -- Colchicine and Indomethacin d/eduardo  -- Oxycodone 10 mg q4h PRN  -- Dilaudid 1 mg q6h PRN  -- Scheduled Tylenol 650 mg TID      IVDU (intravenous drug user)  Pt reports use of IV drugs since the age of 17. States he has consistently used them for 5 years and became sober 6 months ago. He is on Subox-one at home which he last took 3 days ago. He relapsed last week. Last IV opioid use was 3 days ago. Patient has a 1 week old  at home that could be contributing to the relapse. On exam he was tremulous with mildly dilated pupils. He reports recent sweating and cold chills. He denies N/V diarrhea. With his chronic use, he will likely be hyper-analgesic.     Plan:  -- Clinical Opiate Withdrawal Scale: 4  -- Ativan 0.5mg PRN  -- Psych consulted for addiction " management. Following recs. See opioid dependence.  -- Continue to monitor for signs of withdrawl      Opioid use disorder, severe, dependence  Pt reports IV heroin use since the age of 17. He has consistantly used for the past 5 years. He states he was recently 6 months sober but relapsed 1 week ago. He would like to resume his Subox-one while inpatient, however he must be off opioids for > 20 hours per psych.      Plan:   -- Psych consulted for opioid withdrawal recs. Following.  -- Clonidine 0.1mg PO q4hr PRN for withdrawal associated HTN  -- Bentyl 10mg PO q6hr PRN for abdominal muscle cramps  -- Loperamide 2mg PO q6hr PRN for diarrhea  -- Robaxin 500mg PO q6hr PRN for muscle spasm  -- Zofran 4mg ODT q8hr PRN for nausea  -- Vistaril 50mg PO qHS PRN for insomnia    Anxiety and depression  Pt reports hx of anxiety/depression on Abilify and Prozac at home. He states he takes Prozac 40 mg daily and Abilify 20 mg daily which is different than how is documented in the chart.     Plan:  -- Psych consulted for medication recs. Following  -- Abilify 5 mg daily  -- Prozac 10 mg daily    Elevated C-reactive protein (CRP)  See SIRS      Elevated erythrocyte sedimentation rate  See SIRS        VTE Risk Mitigation (From admission, onward)         Ordered     Place sequential compression device  Until discontinued      08/19/20 1643     IP VTE LOW RISK PATIENT  Once      08/19/20 1643                Discharge Planning   TITA: 8/24/2020     Code Status: Full Code   Is the patient medically ready for discharge?:     Reason for patient still in hospital (select all that apply): Treatment  Discharge Plan A: Home                  Daly Hale MD  Department of Hospital Medicine   [facility]

## 2020-08-21 NOTE — PLAN OF CARE
Problem: Occupational Therapy Goal  Goal: Occupational Therapy Goal  Description: Goals to be met by: 9/4/2020     Patient will increase functional independence with ADLs by performing:    UE Dressing with Set-up Assistance.  LE Dressing with Stand-by Assistance.  Grooming while standing at sink with Modified [place].  Toileting from toilet with Supervision for hygiene and clothing management.   Stand pivot transfers with Modified [place].  Toilet transfer to toilet with Modified [place].    Outcome: Ongoing, Progressing   Patient's goals are set.   KRISTEL Man  8/21/2020

## 2020-08-21 NOTE — PT/OT/SLP EVAL
Occupational Therapy   Evaluation/Treatment    Name: Bebo Koroma  MRN: [id number]  Admitting Diagnosis:  SIRS (systemic inflammatory response syndrome) 2 Days Post-Op    Recommendations:     Discharge Recommendations: home health OT  Discharge Equipment Recommendations:  none  Barriers to discharge:  None    Assessment:     Bebo Koroma is a 24 y.o. male with a medical diagnosis of SIRS (systemic inflammatory response syndrome). Performance deficits affecting function: weakness, impaired endurance, impaired self care skills, impaired functional mobilty, gait instability, impaired balance, decreased upper extremity function, pain, decreased ROM, decreased lower extremity function. Patient very limited by pain in L shoulder and R LE on this date. Patient would benefit from continued skilled acute OT 3x/wk to improve functional mobility, increase independence with ADLs, and address established goals. Recommending HHOT once medically appropriate for discharge to increase maximal independence, reduce burden of care, and ensure safety.     Rehab Prognosis: Good; patient would benefit from acute skilled OT services to address these deficits and reach maximum level of function.       Plan:     Patient to be seen 3 x/week to address the above listed problems via self-care/home management, therapeutic activities, therapeutic exercises  · Plan of Care Expires: 09/21/20  · Plan of Care Reviewed with: patient    Subjective     Chief Complaint: pain.    Occupational Profile:  Pt lives with his girlfriend and 1 week only baby girl in a [facility] with 4 DEYA and R handrail. Patient has a walk in shower with no bench or grab bars. Patient was independent prior to this admission and does not use or own any DME. Pt drives but does not work.   Upon discharge, patient will have assistance from girlfriend.       Pain/Comfort:  · Pain Rating 1: 8/10  · Location - Side 1: Left  · Location - Orientation 1: upper  · Location 1:  shoulder  · Pain Addressed 1: Reposition, Distraction(Nursing provided patient medication during evaluation)  · Pain Rating Post-Intervention 1: 8/10    Patients cultural, spiritual, Caodaism conflicts given the current situation: no    Objective:     Communicated with: NSG prior to session.  Patient found supine with telemetry upon OT entry to room.    General Precautions: Standard, fall   Orthopedic Precautions:(WBAT all joints)   Braces: N/A     Occupational Performance:    Bed Mobility:    · Patient completed Scooting/Bridging with supervision  · Patient completed Supine to Sit with supervision  · Patient completed Sit to Supine with supervision    Functional Mobility/Transfers:  · Patient completed Sit <> Stand Transfer with contact guard assistance  with  no assistive device   · Functional Mobility: Patient took a few steps forward with CGA and no AD, but reported being in a lot of pain and had to return to bed.    Activities of Daily Living:  · Upper Body Dressing: total assistance needed at this time per patient report due to pain. Patient declined to perform task.   · Lower Body Dressing: maximal assistance Donning socks sitting EOB    Cognitive/Visual Perceptual:  Cognitive/Psychosocial Skills:     -       Oriented to: Person, Place, Time and Situation   -       Follows Commands/attention:Follows multistep  commands  -       Communication: clear/fluent  -       Memory: No Deficits noted  -       Safety awareness/insight to disability: intact   -       Mood/Affect/Coping skills/emotional control: Appropriate to situation but tearful due to pain   Visual/Perceptual:      -Intact      Physical Exam:  Upper Extremity Range of Motion:     -       Right Upper Extremity: WFL  -       Left Upper Extremity: Patient unable to raise UE at all due to pain.   Upper Extremity Strength:    -       Right Upper Extremity: WFL  -       Left Upper Extremity: 0/5 due to patient unable to raise L UE at all due to pain    Strength:    -       Right Upper Extremity: WFL  -       Left Upper Extremity: WFL  Fine Motor Coordination:    -       Intact  Gross motor coordination:   WFL    AMPAC 6 Click ADL:  AMPAC Total Score: 14    Treatment & Education:  Role of OT and POC  Safety  ADL retraining  Functional mobility training  Discharge planning  Importance of OOB activity  Education:    Patient left HOB elevated with call button in reach and all needs met.     GOALS:   Multidisciplinary Problems     Occupational Therapy Goals        Problem: Occupational Therapy Goal    Goal Priority Disciplines Outcome Interventions   Occupational Therapy Goal     OT, PT/OT Ongoing, Progressing    Description: Goals to be met by: 9/4/2020     Patient will increase functional independence with ADLs by performing:    UE Dressing with Set-up Assistance.  LE Dressing with Stand-by Assistance.  Grooming while standing at sink with Modified [place].  Toileting from toilet with Supervision for hygiene and clothing management.   Stand pivot transfers with Modified [place].  Toilet transfer to toilet with Modified [place].                     History:     Past Medical History:   Diagnosis Date    IVDU (intravenous drug user) 8/19/2020       Past Surgical History:   Procedure Laterality Date    ARTHROSCOPY OF KNEE Right 8/19/2020    Procedure: ARTHROSCOPY, KNEE - cultures & 9 liters saline - arthroscopic leg lozada ;  Surgeon: Kamran Levy MD;  Location: [address];  Service: Orthopedics;  Laterality: Right;    ARTHROTOMY OF ANKLE Right 8/19/2020    Procedure: ARTHROTOMY, ANKLE - cultures & 9 liters of saline - cysto tubing;  Surgeon: Kamran Levy MD;  Location: [address];  Service: Orthopedics;  Laterality: Right;    SHOULDER ARTHROSCOPY Left 8/19/2020    Procedure: ARTHROSCOPY, SHOULDER - cultures & 9 liters saline;  Surgeon: Kamran Levy MD;  Location: [address];  Service: Orthopedics;  Laterality: Left;        Time Tracking:     OT Date of Treatment: 08/21/20  OT Start Time: 1034  OT Stop Time: 1051  OT Total Time (min): 17 min    Billable Minutes:Evaluation 9  Self Care/Home Management 8    KRISTEL Man  8/21/2020

## 2020-08-21 NOTE — MEDICAL/APP STUDENT
"ADDICTION CONSULT FOLLOW UP VISIT     DEPARTMENT:  Psychiatry  SITE: [facility], [address]    DATE OF ADMISSION: 8/19/2020 10:37 AM  LENGTH OF STAY: 1 days    EXAMINING PRACTITIONER: Thom Buenrostro      SUBJECTIVE:     HISTORY    Patient Name: Bebo Koroma  YOB: 1995    CHIEF COMPLAINT   Bebo Koroma is a 24 y.o. male who is being seen today for a follow up visit by the addiction psychiatry consult service.  Bebo Koroma presents with the chief complaint of: SIRS, Opioid use disorder    HPI & PSYCHIATRIC ROS    Pt resting in bed, complaining of "10/10 pain" in his R knee, L shoulder.  Reports hx of suboxone started in January 2020.  Reports last clinic visit 4 days ago at unknown facility with unknown physician who had him on 8mg BID (per  last Rx in July).  He likes suboxone as it helps with his cravings.  He also takes prozac and abilify at home which he would like to restart as it helps his mood.  Endorses depressed mood in recent weeks 2/2 medical problems, finances and relationship strife with his gf.  He does not feel like his depressed mood is out of proportion to his life stressors.  Denies SI/HI/AVH.       Difficult to obtain thorough history from as patient was grimacing and wincing 2/2 pain throughout interview.  Pt just had knee scoped last night.       Reports "years" of IVDU, heroin.  Denies substance use otherwise besides cannabis. Denies hx of psych hospitalization or rehab. He is unsure if he would like to pursue rehab post hospitalization.  He continues to groan and becomes tearful which he attributes to his acute knee, shoulder pain.      PFSH: The patient's past medical history has been reviewed and updated as appropriate within the electronic medical record system.    INTERVAL HISTORY: Patient is tolerating abilify and prozac. Complains of pain that is not controlled      ROS:  Psychiatric Review Of Systems - Is patient experiencing or having changes " "in:     Symptoms of Depression: diminished mood or loss of interest/anhedonia; irritability, diminished energy, change in sleep, change in appetite, diminished concentration     Symptoms of BEVERLY: endorses mild anxiety, denies persistent worry out of proportion to stressors      Symptoms of alejandrina or hypomania: Denies , no objective signs     Symptoms of psychosis: Denies , no objective signs        Suicidal/Homicidal ideations: Denies      Symptoms of Panic Disorder: Denies      Symptoms of PTSD: Denies      PSYCHOTROPIC MEDICATIONS   None      OBJECTIVE:     EXAMINATION    Vitals:    08/21/20 0755 08/21/20 0902 08/21/20 1049 08/21/20 1141   BP:  105/60  108/60   BP Location:  Right arm  Right arm   Patient Position:  Lying  Lying   Pulse:  81  78   Resp: 17 19 18 20   Temp:  97.4 °F (36.3 °C)  98.1 °F (36.7 °C)   TempSrc:  Oral  Oral   SpO2:  (!) 94%  95%   Weight:       Height:           CONSTITUTIONAL  General Appearance: Uncomfortable, appears to be in pain    MUSCULOSKELETAL  Abnormal Involuntary Movements: no    PSYCHIATRIC   Orientation: AAOx3  Speech: normal rate, tone, volume  Language: normal, fluid,  no prosody   Mood: "bad"  Affect: tearful  Thought Process: Linear, goal oriented, organized  Associations: intact, logical  Thought Content: No SI/HI/AVH  Memory: 3/3, 3/3  Attention and Concentration: Intact to conversation  Fund of Knowledge: Intact to conversation  Insight: fair  Judgment: appropriate for setting    ASSESSMENT:     DIAGNOSES & PROBLEMS  Opioid use disorder    Patient Active Problem List   Diagnosis    SIRS (systemic inflammatory response syndrome)    Elevated erythrocyte sedimentation rate    Elevated C-reactive protein (CRP)    IVDU (intravenous drug user)    Oligoarthritis of multiple sites - L shoulder, R knee, R ankle    Anxiety and depression    Septic arthritis of knee, right    Septic arthritis of shoulder, left    Septic arthritis of right ankle    Opioid use disorder, " severe, dependence    Opioid withdrawal    Polyarthralgia         PLAN:       MANAGEMENT PLAN, TREATMENT GOALS, THERAPEUTIC TECHNIQUES/APPROACHES & CLINICAL REASONING    Recommend resuming home medications for mod, anxiety:  -Abilify 5mg qd  -Prozac 10mg qd     Pt with recent hx for suboxone Rx  Pt is amenable to resuming this in the hospital.  Will plan for suboxone initiation once he no longer requires pain control.  Pt currently complaining of 10/10 pain in his knee, shoulder s/p scope. Pt must be off all opioid medications for atleast 18 hours prior to suboxone initiation.        Opiate Withdrawal Protocol:  -Clonidine 0.1mg PO q4hr PRN for withdrawal associated HTN  -Bentyl 10mg PO q6hr PRN for abdominal muscle cramps  -Loperamide 2mg PO q6hr PRN for diarrhea  -Robaxin 500mg PO q6hr PRN for muscle spasm  -Zofran 4mg ODT q8hr PRN for nausea  -Vistaril 50mg PO qHS PRN for insomnia           · Patient counseled on abstinence from alcohol and substances of abuse (illicit and prescription).  · Additional workup planned to address substance use disorder, in order to guide and refine ongoing management options, includes serial alcohol and drug laboratory testing (e.g. PETH, urine toxicology).  · Relapse prevention and motivational interviewing provided.  · Education provided on 12 step recovery programs.        PRESCRIPTION DRUG MANAGEMENT  - The risks and benefits of medication were discussed with this patient.  - Possible expectable adverse effects of any current or proposed individual psychotropic agents were discussed with this patient.  - Counseling was provided on the importance of full compliance with medication regimens.         In cases of emergency, daily coverage provided by Acute/ER Psych MD, NP, or VALERIE, with contact numbers located in [facility] On Call Schedule    Case discussed with staff addiction psychiatrist: MAGED LUCERO***, MD      LABS/IMAGING/STUDIES   Recent Results (from the past  24 hour(s))   Comprehensive Metabolic Panel (CMP)    Collection Time: 08/21/20  4:38 AM   Result Value Ref Range    Sodium 138 136 - 145 mmol/L    Potassium 4.1 3.5 - 5.1 mmol/L    Chloride 102 95 - 110 mmol/L    CO2 29 23 - 29 mmol/L    Glucose 106 70 - 110 mg/dL    BUN, Bld 15 6 - 20 mg/dL    Creatinine 0.6 0.5 - 1.4 mg/dL    Calcium 8.9 8.7 - 10.5 mg/dL    Total Protein 7.1 6.0 - 8.4 g/dL    Albumin 3.0 (L) 3.5 - 5.2 g/dL    Total Bilirubin 0.4 0.1 - 1.0 mg/dL    Alkaline Phosphatase 164 (H) 55 - 135 U/L    AST 16 10 - 40 U/L    ALT 16 10 - 44 U/L    Anion Gap 7 (L) 8 - 16 mmol/L    eGFR if African American >60.0 >60 mL/min/1.73 m^2    eGFR if non African American >60.0 >60 mL/min/1.73 m^2   CBC with Automated Differential    Collection Time: 08/21/20  4:38 AM   Result Value Ref Range    WBC 13.77 (H) 3.90 - 12.70 K/uL    RBC 3.75 (L) 4.60 - 6.20 M/uL    Hemoglobin 11.4 (L) 14.0 - 18.0 g/dL    Hematocrit 34.2 (L) 40.0 - 54.0 %    Mean Corpuscular Volume 91 82 - 98 fL    Mean Corpuscular Hemoglobin 30.4 27.0 - 31.0 pg    Mean Corpuscular Hemoglobin Conc 33.3 32.0 - 36.0 g/dL    RDW 12.6 11.5 - 14.5 %    Platelets 240 150 - 350 K/uL    MPV 11.6 9.2 - 12.9 fL    Immature Granulocytes 1.2 (H) 0.0 - 0.5 %    Gran # (ANC) 10.6 (H) 1.8 - 7.7 K/uL    Immature Grans (Abs) 0.17 (H) 0.00 - 0.04 K/uL    Lymph # 1.6 1.0 - 4.8 K/uL    Mono # 1.3 (H) 0.3 - 1.0 K/uL    Eos # 0.0 0.0 - 0.5 K/uL    Baso # 0.03 0.00 - 0.20 K/uL    nRBC 0 0 /100 WBC    Gran% 77.2 (H) 38.0 - 73.0 %    Lymph% 11.9 (L) 18.0 - 48.0 %    Mono% 9.4 4.0 - 15.0 %    Eosinophil% 0.1 0.0 - 8.0 %    Basophil% 0.2 0.0 - 1.9 %    Differential Method Automated    Phosphorus    Collection Time: 08/21/20  4:38 AM   Result Value Ref Range    Phosphorus 3.4 2.7 - 4.5 mg/dL   Magnesium    Collection Time: 08/21/20  4:38 AM   Result Value Ref Range    Magnesium 2.2 1.6 - 2.6 mg/dL   VANCOMYCIN, TROUGH    Collection Time: 08/21/20  4:38 AM   Result Value Ref Range     Vancomycin-Trough 5.7 (L) 10.0 - 22.0 ug/mL   Sedimentation rate    Collection Time: 08/21/20  4:38 AM   Result Value Ref Range    Sed Rate 90 (H) 0 - 23 mm/Hr   C-Reactive Protein    Collection Time: 08/21/20  4:38 AM   Result Value Ref Range    .6 (H) 0.0 - 8.2 mg/L      Imaging Results          X-Ray Ankle 2 View Right (Final result)  Result time 08/20/20 04:28:30    Final result by Abhi Deng MD (08/20/20 04:28:30)                 Impression:      Mild soft tissue edema about the ankle.  No acute bony abnormality.      Electronically signed by: Abhi Deng MD  Date:    08/20/2020  Time:    04:28             Narrative:    EXAMINATION:  XR ANKLE 2 VIEW RIGHT    CLINICAL HISTORY:  R ankle pain;    TECHNIQUE:  Three views of the right ankle.    COMPARISON:  None.    FINDINGS:  No evidence of acute fracture or dislocation.  No focal bony erosion.  There is mild soft tissue edema about the ankle.  No unexpected radiopaque foreign body.                               X-Ray Knee 3 View Right (Final result)  Result time 08/19/20 22:40:35    Final result by Cuauhtemoc Pearson MD (08/19/20 22:40:35)                 Impression:      Negative right knee.      Electronically signed by: Cuauhtemoc Pearson  Date:    08/19/2020  Time:    22:40             Narrative:    EXAMINATION:  XR KNEE 3 VIEW RIGHT    CLINICAL HISTORY:  R knee pain and swelling;    TECHNIQUE:  AP, lateral, and Merchant views of the right knee were performed.    COMPARISON:  None    FINDINGS:  The bones, joint spaces and soft tissues are intact without fracture or effusion or dislocation.                               X-Ray Shoulder 2 or More Views Left (Final result)  Result time 08/19/20 15:05:20    Final result by Steve Barron MD (08/19/20 15:05:20)                 Impression:      1. No acute displaced fracture or dislocation of the left shoulder.      Electronically signed by: Steve Barron MD  Date:    08/19/2020  Time:    15:05              Narrative:    EXAMINATION:  XR SHOULDER COMPLETE 2 OR MORE VIEWS LEFT    CLINICAL HISTORY:  shoulder pain;    TECHNIQUE:  Two or three views of the left shoulder were performed.    COMPARISON:  None    FINDINGS:  Three views left shoulder.    The left humeral head maintains appropriate relationship with the glenoid.  The acromioclavicular joint is intact.  No acute displaced left rib fracture.  The left lung zones are clear.                               US Lower Extremity Veins Right (Final result)  Result time 08/19/20 14:10:45    Final result by Steve Barron MD (08/19/20 14:10:45)                 Impression:      No evidence of deep venous thrombosis in the right lower extremity.      Electronically signed by: Steve Barron MD  Date:    08/19/2020  Time:    14:10             Narrative:    EXAMINATION:  US LOWER EXTREMITY VEINS RIGHT    CLINICAL HISTORY:  Other specified soft tissue disorders    TECHNIQUE:  Duplex and color flow Doppler evaluation and graded compression of the right lower extremity veins was performed.    COMPARISON:  None    FINDINGS:  Duplex and color flow Doppler evaluation does not reveal any evidence of acute venous thrombosis in the common femoral, superficial femoral, greater saphenous, popliteal, peroneal, anterior tibial and posterior tibial veins of the right lower extremity.  There is no reflux to suggest valvular incompetence.                               X-Ray Chest AP Portable (Final result)  Result time 08/19/20 11:42:19    Final result by Wilbert Munoz MD (08/19/20 11:42:19)                 Impression:      See above      Electronically signed by: Wilbert Munoz MD  Date:    08/19/2020  Time:    11:42             Narrative:    EXAMINATION:  XR CHEST AP PORTABLE    CLINICAL HISTORY:  Sepsis;    TECHNIQUE:  Single frontal view of the chest was performed.    COMPARISON:  None    FINDINGS:  Heart size normal.  The lungs are clear.  No pleural effusion

## 2020-08-21 NOTE — NURSING
Vanc trough ordered to be collected at 0300. Nurse noticed EMAR note for trough to be collected @ 0100. Lab called @ 0305 to see if vanc trough can be collected, lab stated someone will be up to collect trough soon because ordered time is for 0300. Charge nurse aware. Will continue to monitor.

## 2020-08-21 NOTE — SUBJECTIVE & OBJECTIVE
Interval History: POD 2. Pt states he has pain in the left shoulder/ arm today. ROM in the right nee seems to be improved. Slept throughout the night. Discussed with Ortho on rounds he possibility of another shoulder washout.    Review of Systems   Constitutional: Positive for appetite change. Negative for fever.   HENT: Negative for congestion, sore throat and trouble swallowing.    Eyes: Negative for photophobia, pain and discharge.   Respiratory: Negative for cough and shortness of breath.    Cardiovascular: Negative for chest pain and palpitations.   Gastrointestinal: Negative for abdominal pain, diarrhea, nausea and vomiting.   Genitourinary: Negative for difficulty urinating.   Musculoskeletal: Positive for arthralgias and joint swelling. Negative for back pain, myalgias and neck pain.   Skin: Negative for pallor and rash.   Neurological: Negative for light-headedness, numbness and headaches.     Objective:     Vital Signs (Most Recent):  Temp: 98.1 °F (36.7 °C) (08/21/20 1141)  Pulse: 78 (08/21/20 1141)  Resp: 20 (08/21/20 1141)  BP: 108/60 (08/21/20 1141)  SpO2: 95 % (08/21/20 1141) Vital Signs (24h Range):  Temp:  [96.5 °F (35.8 °C)-98.2 °F (36.8 °C)] 98.1 °F (36.7 °C)  Pulse:  [62-83] 78  Resp:  [16-20] 20  SpO2:  [94 %-99 %] 95 %  BP: ()/(53-67) 108/60     Weight: 63.5 kg (140 lb)  Body mass index is 21.29 kg/m².    Intake/Output Summary (Last 24 hours) at 8/21/2020 1207  Last data filed at 8/21/2020 0853  Gross per 24 hour   Intake 160 ml   Output 400 ml   Net -240 ml      Physical Exam  Constitutional:       Appearance: Normal appearance. He is ill-appearing.   Eyes:      Conjunctiva/sclera: Conjunctivae normal.      Pupils: Pupils are equal, round, and reactive to light.   Cardiovascular:      Rate and Rhythm: Normal rate and regular rhythm.      Pulses: Normal pulses.      Heart sounds: Normal heart sounds.   Pulmonary:      Effort: Pulmonary effort is normal. No respiratory distress.       Breath sounds: Normal breath sounds.   Abdominal:      General: Bowel sounds are normal. There is no distension.      Palpations: Abdomen is soft.      Tenderness: There is no abdominal tenderness.   Musculoskeletal:         General: Swelling and tenderness present.      Comments: Dressings in place to L shoulder, R knee, R ankle.    Skin:     General: Skin is warm.      Findings: No bruising or rash.   Neurological:      Mental Status: He is alert and oriented to person, place, and time.         Significant Labs:   BMP:   Recent Labs   Lab 08/21/20  0438         K 4.1      CO2 29   BUN 15   CREATININE 0.6   CALCIUM 8.9   MG 2.2     CBC:   Recent Labs   Lab 08/20/20  0454 08/21/20  0438   WBC 11.07 13.77*   HGB 11.7* 11.4*   HCT 34.7* 34.2*    240       Significant Imaging: I have reviewed all pertinent imaging results/findings within the past 24 hours.

## 2020-08-21 NOTE — PROGRESS NOTES
Pharmacokinetic Assessment Follow Up: IV Vancomycin    Vancomycin serum concentration assessment(s):    Trough collected a few hours after scheduled but level still low.     Vancomycin Regimen Plan:    Increase vancomycin frequency to 1,000 mg every 8 hours. Trough goal 15-20 ug/mL. Trough scheduled for 8/22 @ 0500.    Drug levels (last 3 results):  Recent Labs   Lab Result Units 08/21/20  0438   Vancomycin-Trough ug/mL 5.7*       Pharmacy will continue to follow and monitor vancomycin.    Please contact pharmacy at extension [phone]  for questions regarding this assessment.    Thank you for the consult,   Ksenia Staley       Patient brief summary:  Bebo Koroma is a 24 y.o. male initiated on antimicrobial therapy with IV Vancomycin for treatment of bone/joint infection        Drug Allergies:   Review of patient's allergies indicates:   Allergen Reactions    Penicillins Hives     Unclear of last episode of allergic reaction       Actual Body Weight:   63.5 kg    Renal Function:   Estimated Creatinine Clearance: 172 mL/min (based on SCr of 0.6 mg/dL).,     Dialysis Method (if applicable):  n/a    CBC (last 72 hours):  Recent Labs   Lab Result Units 08/19/20  1140 08/20/20  0454 08/21/20  0438   WBC K/uL 9.05 11.07 13.77*   Hemoglobin g/dL 13.7* 11.7* 11.4*   Hematocrit % 40.2 34.7* 34.2*   Platelets K/uL 141* 154 240   Gran% % 65.9 81.1* 77.2*   Lymph% % 20.4 12.5* 11.9*   Mono% % 12.5 5.1 9.4   Eosinophil% % 0.1 0.1 0.1   Basophil% % 0.3 0.2 0.2   Differential Method  Automated Automated Automated       Metabolic Panel (last 72 hours):  Recent Labs   Lab Result Units 08/19/20  1140 08/19/20  1141 08/20/20  0129 08/20/20  0454 08/21/20  0438   Sodium mmol/L 134*  --   --  134* 138   Potassium mmol/L 3.9  --   --  4.1 4.1   Chloride mmol/L 97  --   --  100 102   CO2 mmol/L 27  --   --  28 29   Glucose mg/dL 92  --   --  216* 106   Glucose, UA   --  Negative  --   --   --    BUN, Bld mg/dL 9  --   --  7 15    Creatinine mg/dL 0.8  --   --  0.7 0.6   Creatinine, Random Ur mg/dL  --   --  52.0  --   --    Albumin g/dL 3.6  --   --  3.0* 3.0*   Total Bilirubin mg/dL 0.9  --   --  0.6 0.4   Alkaline Phosphatase U/L 110  --   --  91 164*   AST U/L 20  --   --  16 16   ALT U/L 24  --   --  18 16   Magnesium mg/dL  --   --   --  2.1 2.2   Phosphorus mg/dL  --   --   --  3.0 3.4       Vancomycin Administrations:  vancomycin given in the last 96 hours                   vancomycin in dextrose 5 % 1 gram/250 mL IVPB 1,000 mg (mg) 1,000 mg New Bag 08/21/20 0534     1,000 mg New Bag 08/20/20 1557     1,000 mg New Bag  0211    vancomycin injection (g) 3 g Given 08/19/20 2325    vancomycin 1.25 g in dextrose 5% 250 mL IVPB (ready to mix) (mg) 1,250 mg New Bag 08/19/20 1251                Microbiologic Results:  Microbiology Results (last 7 days)     Procedure Component Value Units Date/Time    AFB Culture & Smear [[id number]] Collected: 08/19/20 1804    Order Status: Completed Specimen: Joint Fluid from Ankle, Right Updated: 08/20/20 2127     AFB Culture & Smear Culture in progress     AFB CULTURE STAIN No acid fast bacilli seen.    AFB Culture & Smear [[id number]] Collected: 08/19/20 1805    Order Status: Completed Specimen: Joint Fluid from Shoulder, Left Updated: 08/20/20 2127     AFB Culture & Smear Culture in progress     AFB CULTURE STAIN No acid fast bacilli seen.    AFB Culture & Smear [[id number]] Collected: 08/19/20 1802    Order Status: Completed Specimen: Joint Fluid from Knee, Right Updated: 08/20/20 2127     AFB Culture & Smear Culture in progress     AFB CULTURE STAIN No acid fast bacilli seen.    Blood culture [[id number]] Collected: 08/19/20 1644    Order Status: Completed Specimen: Blood from Peripheral, Antecubital, Right Updated: 08/20/20 1822     Blood Culture, Routine No Growth to date      No Growth to date    Blood culture x two cultures. Draw prior to antibiotics. [[id number]] Collected: 08/19/20 1140    Order  Status: Completed Specimen: Blood from Peripheral, Antecubital, Right Updated: 08/20/20 1412     Blood Culture, Routine No Growth to date      No Growth to date    Narrative:      Aerobic and anaerobic    Blood culture x two cultures. Draw prior to antibiotics. [[id number]] Collected: 08/19/20 1141    Order Status: Completed Specimen: Blood from Peripheral, Forearm, Right Updated: 08/20/20 1412     Blood Culture, Routine No Growth to date      No Growth to date    Narrative:      Aerobic and anaerobic    Chlamydia/Neisseria gonorrhoeae RNA, TMA [[id number]] Collected: 08/19/20 1140    Order Status: No result Updated: 08/20/20 0902    Culture, Anaerobic [[id number]] Collected: 08/19/20 1805    Order Status: Completed Specimen: Joint Fluid from Shoulder, Left Updated: 08/20/20 0727     Anaerobic Culture Culture in progress    Culture, Anaerobic [[id number]] Collected: 08/19/20 1802    Order Status: Completed Specimen: Joint Fluid from Knee, Right Updated: 08/20/20 0727     Anaerobic Culture Culture in progress    Culture, Anaerobic [[id number]] Collected: 08/19/20 1804    Order Status: Completed Specimen: Joint Fluid from Ankle, Right Updated: 08/20/20 0727     Anaerobic Culture Culture in progress    Aerobic culture [[id number]] Collected: 08/19/20 1802    Order Status: Completed Specimen: Joint Fluid from Knee, Right Updated: 08/20/20 0722     Aerobic Bacterial Culture No growth    Aerobic culture [[id number]] Collected: 08/19/20 1805    Order Status: Completed Specimen: Joint Fluid from Shoulder, Left Updated: 08/20/20 0722     Aerobic Bacterial Culture No growth    Aerobic culture [[id number]] Collected: 08/19/20 1804    Order Status: Completed Specimen: Joint Fluid from Ankle, Right Updated: 08/20/20 0722     Aerobic Bacterial Culture No growth    Gram stain [[id number]] Collected: 08/19/20 1802    Order Status: Completed Specimen: Joint Fluid from Knee, Right Updated: 08/19/20 1932     Gram Stain Result Rare WBC's       No organisms seen    Gram stain [[id number]] Collected: 08/19/20 1804    Order Status: Completed Specimen: Joint Fluid from Ankle, Right Updated: 08/19/20 1930     Gram Stain Result Rare WBC's      No organisms seen    Gram stain [[id number]] Collected: 08/19/20 1805    Order Status: Completed Specimen: Joint Fluid from Shoulder, Left Updated: 08/19/20 1929     Gram Stain Result No WBC's      No organisms seen    Fungus culture [[id number]] Collected: 08/19/20 1802    Order Status: Sent Specimen: Joint Fluid from Knee, Right Updated: 08/19/20 1816    Fungus culture [[id number]] Collected: 08/19/20 1804    Order Status: Sent Specimen: Joint Fluid from Ankle, Right Updated: 08/19/20 1813    Fungus culture [[id number]] Collected: 08/19/20 1805    Order Status: Sent Specimen: Joint Fluid from Shoulder, Left Updated: 08/19/20 1811    C. trachomatis/N. gonorrhoeae by AMP DNA [[id number]] Collected: 08/19/20 1140    Order Status: Canceled Specimen: Genital

## 2020-08-21 NOTE — PT/OT/SLP EVAL
Physical Therapy Evaluation    Patient Name:  Bebo Koroma   MRN:  [id number]    Recommendations:     Discharge Recommendations:  home(Home vs. Home c/ HH)   Discharge Equipment Recommendations: none   Barriers to discharge: None    Assessment:     Bebo Koroma is a 24 y.o. male admitted with a medical diagnosis of SIRS (systemic inflammatory response syndrome).  He presents with the following impairments/functional limitations:  weakness, impaired functional mobilty, gait instability, impaired balance, decreased lower extremity function, decreased upper extremity function, pain, decreased ROM. Pt tolerated assessment poorly. Pt is primarily limited by pain this visit. Pt performed bed mobility with supervision. Pt able to sit to stand with CGA, took 2 steps from EOB and then returned to bed due to pain. Pt with decreased WB on the R and R knee flexed, pt holding on to wall to stabilize but unable to use RW due to R shoulder pain. Pt tearful throughout session due to pain and his current situation. RN notified of pts pain level and present to administer pain meds during treatment session. Pt will likely be able to return home with no needs once his pain is under control. Pt will benefit from skilled PT services during this admission in order to improve independence with functional mobility and maximize return to PLOF.     Rehab Prognosis: Good; patient would benefit from acute skilled PT services to address these deficits and reach maximum level of function.    Recent Surgery: Procedure(s) (LRB):  ARTHROSCOPY, KNEE - cultures & 9 liters saline - arthroscopic leg lozada  (Right)  ARTHROSCOPY, SHOULDER - cultures & 9 liters saline (Left)  ARTHROTOMY, ANKLE - cultures & 9 liters of saline - cysto tubing (Right) 2 Days Post-Op    Plan:     During this hospitalization, patient to be seen 3 x/week to address the identified rehab impairments via gait training, therapeutic activities, therapeutic exercises,  "neuromuscular re-education and progress toward the following goals:    · Plan of Care Expires:  09/20/20    Subjective     Chief Complaint: pain; "The meds don't even help, that's the problem"  Patient/Family Comments/goals: to reduce pain  Pain/Comfort:  · Pain Rating 1: 8/10  · Location - Side 1: Left  · Location - Orientation 1: upper  · Location 1: shoulder  · Pain Addressed 1: Reposition, Nurse notified, Cessation of Activity  · Pain Rating Post-Intervention 1: 8/10  · Pain Rating 2: 8/10  · Location - Side 2: Right  · Location 2: knee  · Pain Addressed 2: Cessation of Activity, Nurse notified, Reposition  · Pain Rating Post-Intervention 2: 8/10    Patients cultural, spiritual, Worship conflicts given the current situation: no    Living Environment:  Pt lives with his girlfriend and 1 week only baby girl in a H with 4 DEYA and R handrail. Pt was independent prior to this admission and does not use or own any DME. Pt drives but does not work.  Upon discharge, patient will have assistance from girlfriend.    Objective:     Communicated with RN prior to session.  Patient found supine with telemetry  upon PT entry to room.    General Precautions: Standard, fall   Orthopedic Precautions:(WBAT all joints)   Braces: N/A     Exams:  · Cognitive Exam:  Patient is oriented to Person, Place, Time and Situation  · Skin Integrity/Edema:      · -       Edema: Mild LUE and RLE  · RLE ROM: Unable to assess; limited due to pain  · RLE Strength: unable to assess, limited due to pain  · LLE ROM: WFL  · LLE Strength: WFL   · LUE ROM/strength limited to L shoulder pain; pt reports that he is going to have a procedure done on the L shoulder later today/tomorrow    Functional Mobility:  · Bed Mobility:     · Supine to Sit: supervision  · Sit to Supine: supervision  · Transfers:     · Sit to Stand:  contact guard assistance with no AD  · Gait: Pt took 2 steps away from EOB. Pt with flexed R knee and decreased WB on the right. Pt " with step-to gait pattern. Pt returned safely to bed but declined further ambulation due to pain. Pt tearful at EOB, RN notified of pain level. Pt reaching for wall to stabilize self, PT encouraged use of AD but pt unable to use RW due to shoulder pain and declined use of QC      Therapeutic Activities and Exercises:   Treatment & Education:  · Therapist provided facilitation and instruction of proper body mechanics, energy conservation, and fall prevention strategies during tasks listed above.  · Encouraged patient to sit up in bedside chair daily to increase OOB/activity tolerance.  · Instructed patient to call nursing staff  for assist with transfers.   · Educated patient on PT POC and answered all questions within PT scope of practice.  · Whiteboard updated       AM-PAC 6 CLICK MOBILITY  Total Score:18     Patient left supine with all lines intact, call button in reach and RN present.    GOALS:   Multidisciplinary Problems     Physical Therapy Goals        Problem: Physical Therapy Goal    Goal Priority Disciplines Outcome Goal Variances Interventions   Physical Therapy Goal     PT, PT/OT Ongoing, Progressing     Description: Goals to be met by: 20     Patient will increase functional independence with mobility by performin. Bed mobility with independence  2. Sit to stand transfer with Modified [place]  3. Bed to chair transfer with Modified [place] using LRAD  4. Gait  x 150 feet with Supervision using LRAD.   5. Ascend/descend 4 stair with right Handrails Supervision using LRAD  .                      History:     Past Medical History:   Diagnosis Date    IVDU (intravenous drug user) 2020       Past Surgical History:   Procedure Laterality Date    ARTHROSCOPY OF KNEE Right 2020    Procedure: ARTHROSCOPY, KNEE - cultures & 9 liters saline - arthroscopic leg lozada ;  Surgeon: Kamran Levy MD;  Location: [facility] OR [address] [place] [address];  Service: Orthopedics;  Laterality: Right;     ARTHROTOMY OF ANKLE Right 8/19/2020    Procedure: ARTHROTOMY, ANKLE - cultures & 9 liters of saline - cysto tubing;  Surgeon: Kamran Levy MD;  Location: [facility] OR [address] [place] [address];  Service: Orthopedics;  Laterality: Right;    SHOULDER ARTHROSCOPY Left 8/19/2020    Procedure: ARTHROSCOPY, SHOULDER - cultures & 9 liters saline;  Surgeon: Kamran Levy MD;  Location: [facility] OR [address] [place] [address];  Service: Orthopedics;  Laterality: Left;       Time Tracking:     PT Received On: 08/21/20  PT Start Time: 1033     PT Stop Time: 1049  PT Total Time (min): 16 min     Billable Minutes: Evaluation 16      NIRMAL SANDERS, PT  08/21/2020

## 2020-08-21 NOTE — ASSESSMENT & PLAN NOTE
"24 year old M with a PMHx of IV drug abuse and depression presented to the ED complaining of left shoulder pain, right knee pain, and right ankle pain. On arrival temp 101.4, pulse 111, elevated CRP and Sed rate. WBC wnl. He has significant decreased ROM of left shoulder and swelling of right knee. Infectious workup started in the ED. Vanc and zosyn given in the ED.    Xray left shoulder, R knee, R ankle, CXR, and US right lower leg: no significant findings    POD 2: arthroscopy R knee and L shoulder, arthrotomy R ankle -  per ortho op-note "I think this is either a very early case of septic arthritis of multiple joints versus inflammatory poly arthritis. Given his febrile state and IV drug abuse I think he should be treated as though the septic arthritis even if his cultures fail to grow anything."    Plan:   -- Ortho consulted for septic arthritis r/o. Follwing recs  -- Aspiration of joints: R knee (31K WBC, 90% segs) R ankle (600 WBC, 43% segs), L shoulder clotted  -- Fluid aspiration gram stain pending  -- Continue Rocephin and Vanc  -- ID consulted. Appreciate recs  -- Blood cultures NGTD  -- Chlamydia and gonnorhea DNA, RPR, HIV, hepatitis panel pending   -- Procalcitonin 0.67      "

## 2020-08-21 NOTE — ASSESSMENT & PLAN NOTE
Pt reports use of IV drugs since the age of 17. States he has consistently used them for 5 years and became sober 6 months ago. He is on Subox-one at home which he last took 3 days ago. He relapsed last week. Last IV opioid use was 3 days ago. Patient has a 1 week old  at home that could be contributing to the relapse. On exam he was tremulous with mildly dilated pupils. He reports recent sweating and cold chills. He denies N/V diarrhea. With his chronic use, he will likely be hyper-analgesic.     Plan:  -- Clinical Opiate Withdrawal Scale: 4  -- Ativan 0.5mg PRN  -- Psych consulted for addiction management. Following recs. See opioid dependence.  -- Continue to monitor for signs of withdrawl

## 2020-08-21 NOTE — PROGRESS NOTES
[facility]  Infectious Disease  Progress Note    Patient Name: Bebo Koroma  MRN: [id number]  Admission Date: 8/19/2020  Length of Stay: 1 days  Attending Physician: Ronna Greene MD  Primary Care Provider: Primary Doctor No    Isolation Status: No active isolations  Assessment/Plan:      Septic arthritis of knee, right  23 y/o male with IVDU presents with polyarthritis and fevers at home. He developed right ankle, right knee and left shoulder pain 3 days ago with associated fever, chills, fatigue, and night sweats. Denies n/v and chest pain. He last IV drug use was 3 days ago and inject to left arm.     tmax 101.4 in ED with elevated inflammatory markers. started on vancomycin and zosyn in ED. underwent aspiration of multiple joints yesterday (R knee 31K cells 90% segs, right ankle 600 WBC 43%segs, left shoulder no data)  He underwent washout with orthopedics yeste8/19 rday. Per op note, murky fluid was encountered from joint spaces. Cultures NGTD. infectious workup pending. Blood cultures NGTD.    Recommendations  1. Continue vancomycin and ceftriaxone  2. Will follow cultures and tailor abx accordingly  3. Likely need long term IV abx therapy. Will discuss with orthopedics. Possible repeat washout?   4. ID will follow with you             Thank you for your consult. I will follow-up with patient. Please contact us if you have any additional questions.    Sher Alfaro PA-C  Infectious Disease  [facility]  [id number]    Subjective:     Principal Problem:SIRS (systemic inflammatory response syndrome)    HPI: 23 y/o male with IVDU presents with polyarthritis and fevers at home. He developed right ankle, right knee and left shoulder pain 3 days ago with associated fever, chills, fatigue, and night sweats. Denies n/v/ and chest pain. He last IV drug use was 3 days ago and inject to left arm.     tmax 101.4 in ED with elevated inflammatory markers. started on vancomycin and zosyn  in ED. He underwent washout with orthopedics yesterday.     R Knee Joint Fluid Analysis:  WBC: 31k cells  Segs: 90%  Crystals: negative  Gram Stain: negative  Cultures pending     R Ankle Joint Fluid Analysis:  WBC: 600 cells  Segs: 43%  Crystals: negative  Gram Stain: negative  Cultures pending     L Shoulder Joint Fluid Analysis:  WBC: clotted  Segs: clotted  Crystals: negative  Gram Stain: negative  Cultures pending    Per op note, murky fluid was encountered from joint spaces. Cultures pending.   Interval History:   More calm and comfortable today. Surgical cultures NGTD. Possible repeat washout with orthopedics.     Review of Systems   Constitutional: Positive for activity change, appetite change and fatigue. Negative for chills, diaphoresis and fever.   Respiratory: Negative for cough and shortness of breath.    Gastrointestinal: Negative for abdominal pain, nausea and vomiting.   Genitourinary: Negative for dysuria.   Musculoskeletal: Positive for arthralgias and joint swelling. Negative for back pain.   Skin: Negative for color change, pallor, rash and wound.   Neurological: Negative for dizziness and headaches.   All other systems reviewed and are negative.    Objective:     Vital Signs (Most Recent):  Temp: 98.1 °F (36.7 °C) (08/21/20 1141)  Pulse: 78 (08/21/20 1141)  Resp: 17 (08/21/20 1240)  BP: 108/60 (08/21/20 1141)  SpO2: 95 % (08/21/20 1141) Vital Signs (24h Range):  Temp:  [96.5 °F (35.8 °C)-98.2 °F (36.8 °C)] 98.1 °F (36.7 °C)  Pulse:  [62-83] 78  Resp:  [16-20] 17  SpO2:  [94 %-99 %] 95 %  BP: ()/(53-67) 108/60     Weight: 63.5 kg (140 lb)  Body mass index is 21.29 kg/m².    Estimated Creatinine Clearance: 172 mL/min (based on SCr of 0.6 mg/dL).    Physical Exam  Vitals signs and nursing note reviewed.   Constitutional:       General: He is not in acute distress.     Appearance: He is not ill-appearing, toxic-appearing or diaphoretic.      Comments: thin   HENT:      Head: Normocephalic.    Cardiovascular:      Rate and Rhythm: Regular rhythm. Tachycardia present.      Heart sounds: No murmur. No friction rub. No gallop.    Pulmonary:      Effort: Pulmonary effort is normal. No respiratory distress.      Breath sounds: Normal breath sounds. No stridor.   Abdominal:      General: Abdomen is flat. There is no distension.      Palpations: Abdomen is soft. There is no mass.   Musculoskeletal:         General: Swelling and tenderness present. No deformity or signs of injury.   Skin:     General: Skin is warm and dry.      Coloration: Skin is not jaundiced or pale.   Neurological:      General: No focal deficit present.      Mental Status: He is oriented to person, place, and time.   Psychiatric:      Comments: tearful         Significant Labs: All pertinent labs within the past 24 hours have been reviewed.    Significant Imaging: I have reviewed all pertinent imaging results/findings within the past 24 hours.

## 2020-08-21 NOTE — SUBJECTIVE & OBJECTIVE
Principal Problem:SIRS (systemic inflammatory response syndrome)    Principal Orthopedic Problem: s/p L shoulder arthroscopy, R knee arthroscopy and R ankle arthrotomy on 8/19/20    Interval History: The patient was seen and examined at the bedside. NAEON. Tolerating PO intake. VSS. Pain improved significantly in R knee and R ankle, able to ambulate without pain. Pt complaining of pain with L shoulder ROM.      Review of patient's allergies indicates:   Allergen Reactions    Penicillins Hives     Unclear of last episode of allergic reaction       Current Facility-Administered Medications   Medication    acetaminophen tablet 650 mg    ARIPiprazole tablet 5 mg    aspirin EC tablet 81 mg    cefTRIAXone (ROCEPHIN) 2 g/50 mL D5W IVPB    cloNIDine tablet 0.1 mg    dextrose 50% injection 12.5 g    dextrose 50% injection 25 g    dicyclomine capsule 10 mg    FLUoxetine capsule 10 mg    glucagon (human recombinant) injection 1 mg    glucose chewable tablet 16 g    glucose chewable tablet 24 g    HYDROmorphone injection 1 mg    hydrOXYzine pamoate capsule 50 mg    loperamide capsule 2 mg    LORazepam tablet 0.5 mg    melatonin tablet 6 mg    methocarbamoL tablet 500 mg    naloxone 0.4 mg/mL injection 0.4 mg    ondansetron disintegrating tablet 8 mg    oxyCODONE immediate release tablet Tab 10 mg    pantoprazole EC tablet 40 mg    prochlorperazine tablet 10 mg    senna-docusate 8.6-50 mg per tablet 1 tablet    sodium chloride 0.9% flush 10 mL    vancomycin in dextrose 5 % 1 gram/250 mL IVPB 1,000 mg     Objective:     Vital Signs (Most Recent):  Temp: 98.1 °F (36.7 °C) (08/21/20 1141)  Pulse: 78 (08/21/20 1141)  Resp: (!) 1 (08/21/20 1737)  BP: 108/60 (08/21/20 1141)  SpO2: 95 % (08/21/20 1141) Vital Signs (24h Range):  Temp:  [97.4 °F (36.3 °C)-98.2 °F (36.8 °C)] 98.1 °F (36.7 °C)  Pulse:  [62-83] 78  Resp:  [1-20] 1  SpO2:  [94 %-99 %] 95 %  BP: ()/(53-67) 108/60     Weight: 63.5 kg (140  "lb)  Height: 5' 8" (172.7 cm)  Body mass index is 21.29 kg/m².      Intake/Output Summary (Last 24 hours) at 8/21/2020 1757  Last data filed at 8/21/2020 0853  Gross per 24 hour   Intake 160 ml   Output 400 ml   Net -240 ml       Ortho/SPM Exam     LUE  Dressings c,d,i  Mild soreness with passive shoulder ROM  SILT M/U/R  Motor intact AIN/PIN/M/U/R   Cap refill < 2s  2+ RP    RLE:  Dressings to ankle and knee c,d,i  Soreness with ankle and knee passive  SILT Sa/Bell/DP/SP/T  Motor intact EHL/FHL/TA/Gastroc  2+ DP, 2+ PT          Significant Labs:   CBC:   Recent Labs   Lab 08/20/20 0454 08/21/20  0438   WBC 11.07 13.77*   HGB 11.7* 11.4*   HCT 34.7* 34.2*    240     CMP:   Recent Labs   Lab 08/20/20 0454 08/21/20  0438   * 138   K 4.1 4.1    102   CO2 28 29   * 106   BUN 7 15   CREATININE 0.7 0.6   CALCIUM 8.7 8.9   PROT 6.9 7.1   ALBUMIN 3.0* 3.0*   BILITOT 0.6 0.4   ALKPHOS 91 164*   AST 16 16   ALT 18 16   ANIONGAP 6* 7*   EGFRNONAA >60.0 >60.0     CRP:   Recent Labs   Lab 08/21/20 0438   .6*     All pertinent labs within the past 24 hours have been reviewed.    Significant Imaging: I have reviewed all pertinent imaging results/findings.  "

## 2020-08-21 NOTE — ASSESSMENT & PLAN NOTE
25 y/o male with IVDU presents with polyarthritis and fevers at home. He developed right ankle, right knee and left shoulder pain 3 days ago with associated fever, chills, fatigue, and night sweats. Denies n/v and chest pain. He last IV drug use was 3 days ago and inject to left arm.     tmax 101.4 in ED with elevated inflammatory markers. started on vancomycin and zosyn in ED. underwent aspiration of multiple joints yesterday (R knee 31K cells 90% segs, right ankle 600 WBC 43%segs, left shoulder no data)  He underwent washout with orthopedics yeste8/19 rday. Per op note, murky fluid was encountered from joint spaces. Cultures NGTD. infectious workup pending. Blood cultures NGTD.    Recommendations  1. Continue vancomycin and ceftriaxone  2. Will follow cultures and tailor abx accordingly  3. Likely need long term IV abx therapy. Will discuss with orthopedics. Possible repeat washout?   4. ID will follow with you

## 2020-08-21 NOTE — PROGRESS NOTES
[facility]  Orthopedics  Progress Note    Patient Name: Bebo Koroma  MRN: [id number]  Admission Date: 8/19/2020  Hospital Length of Stay: 1 days  Attending Provider: Ronna Greene MD  Primary Care Provider: Primary Doctor No  Follow-up For: Procedure(s) (LRB):  ARTHROSCOPY, KNEE - cultures & 9 liters saline - arthroscopic leg lozada  (Right)  ARTHROSCOPY, SHOULDER - cultures & 9 liters saline (Left)  ARTHROTOMY, ANKLE - cultures & 9 liters of saline - cysto tubing (Right)    Post-Operative Day: 2 Days Post-Op  Subjective:     Principal Problem:SIRS (systemic inflammatory response syndrome)    Principal Orthopedic Problem: s/p L shoulder arthroscopy, R knee arthroscopy and R ankle arthrotomy on 8/19/20    Interval History: The patient was seen and examined at the bedside. NAEON. Tolerating PO intake. VSS. Pain improved significantly in R knee and R ankle, able to ambulate without pain. Pt complaining of pain with L shoulder ROM.      Review of patient's allergies indicates:   Allergen Reactions    Penicillins Hives     Unclear of last episode of allergic reaction       Current Facility-Administered Medications   Medication    acetaminophen tablet 650 mg    ARIPiprazole tablet 5 mg    aspirin EC tablet 81 mg    cefTRIAXone (ROCEPHIN) 2 g/50 mL D5W IVPB    cloNIDine tablet 0.1 mg    dextrose 50% injection 12.5 g    dextrose 50% injection 25 g    dicyclomine capsule 10 mg    FLUoxetine capsule 10 mg    glucagon (human recombinant) injection 1 mg    glucose chewable tablet 16 g    glucose chewable tablet 24 g    HYDROmorphone injection 1 mg    hydrOXYzine pamoate capsule 50 mg    loperamide capsule 2 mg    LORazepam tablet 0.5 mg    melatonin tablet 6 mg    methocarbamoL tablet 500 mg    naloxone 0.4 mg/mL injection 0.4 mg    ondansetron disintegrating tablet 8 mg    oxyCODONE immediate release tablet Tab 10 mg    pantoprazole EC tablet 40 mg    prochlorperazine tablet 10  "mg    senna-docusate 8.6-50 mg per tablet 1 tablet    sodium chloride 0.9% flush 10 mL    vancomycin in dextrose 5 % 1 gram/250 mL IVPB 1,000 mg     Objective:     Vital Signs (Most Recent):  Temp: 98.1 °F (36.7 °C) (08/21/20 1141)  Pulse: 78 (08/21/20 1141)  Resp: (!) 1 (08/21/20 1737)  BP: 108/60 (08/21/20 1141)  SpO2: 95 % (08/21/20 1141) Vital Signs (24h Range):  Temp:  [97.4 °F (36.3 °C)-98.2 °F (36.8 °C)] 98.1 °F (36.7 °C)  Pulse:  [62-83] 78  Resp:  [1-20] 1  SpO2:  [94 %-99 %] 95 %  BP: ()/(53-67) 108/60     Weight: 63.5 kg (140 lb)  Height: 5' 8" (172.7 cm)  Body mass index is 21.29 kg/m².      Intake/Output Summary (Last 24 hours) at 8/21/2020 1757  Last data filed at 8/21/2020 0853  Gross per 24 hour   Intake 160 ml   Output 400 ml   Net -240 ml       Ortho/SPM Exam     LUE  Dressings c,d,i  Mild soreness with passive shoulder ROM  SILT M/U/R  Motor intact AIN/PIN/M/U/R   Cap refill < 2s  2+ RP    RLE:  Dressings to ankle and knee c,d,i  Soreness with ankle and knee passive  SILT Sa/Bell/DP/SP/T  Motor intact EHL/FHL/TA/Gastroc  2+ DP, 2+ PT          Significant Labs:   CBC:   Recent Labs   Lab 08/20/20 0454 08/21/20  0438   WBC 11.07 13.77*   HGB 11.7* 11.4*   HCT 34.7* 34.2*    240     CMP:   Recent Labs   Lab 08/20/20  0454 08/21/20  0438   * 138   K 4.1 4.1    102   CO2 28 29   * 106   BUN 7 15   CREATININE 0.7 0.6   CALCIUM 8.7 8.9   PROT 6.9 7.1   ALBUMIN 3.0* 3.0*   BILITOT 0.6 0.4   ALKPHOS 91 164*   AST 16 16   ALT 18 16   ANIONGAP 6* 7*   EGFRNONAA >60.0 >60.0     CRP:   Recent Labs   Lab 08/21/20  0438   .6*     All pertinent labs within the past 24 hours have been reviewed.    Significant Imaging: I have reviewed all pertinent imaging results/findings.    Assessment/Plan:     Septic arthritis of knee, right  Bebo Koroma is a 24 y.o. male IVDU presenting with fevers, left shoulder pain, right knee pain, right ankle pain s/p L shoulder arthroscopy, R " knee arthroscopy and R ankle arthrotomy on 8/19/20. R knee and R ankle pain have improved significantly since yesterday, but having some soreness with ROM.    -Trend ESR and CRP daily  -Follow cultures, ngtd  -PT/OT WBAT in all joints  -Abx, continue vanc/rocephin;  ID recs once cx and sensitivities  -DVT asa 81 daily per medicine team  -No plans for additional operative intervention at this time.                Tian Bucio MD  Orthopedics  [facility]

## 2020-08-21 NOTE — ASSESSMENT & PLAN NOTE
Bebo Koroma is a 24 y.o. male IVDU presenting with fevers, left shoulder pain, right knee pain, right ankle pain s/p L shoulder arthroscopy, R knee arthroscopy and R ankle arthrotomy on 8/19/20. R knee and R ankle pain have improved significantly since yesterday, but having some soreness with ROM.    -Trend ESR and CRP daily  -Follow cultures, ngtd  -PT/OT WBAT in all joints  -Abx, continue vanc/rocephin;  ID recs once cx and sensitivities  -DVT asa 81 daily per medicine team  -No plans for additional operative intervention at this time.

## 2020-08-21 NOTE — SUBJECTIVE & OBJECTIVE
Interval History:   More calm and comfortable today. Surgical cultures NGTD. Possible repeat washout with orthopedics.     Review of Systems   Constitutional: Positive for activity change, appetite change and fatigue. Negative for chills, diaphoresis and fever.   Respiratory: Negative for cough and shortness of breath.    Gastrointestinal: Negative for abdominal pain, nausea and vomiting.   Genitourinary: Negative for dysuria.   Musculoskeletal: Positive for arthralgias and joint swelling. Negative for back pain.   Skin: Negative for color change, pallor, rash and wound.   Neurological: Negative for dizziness and headaches.   All other systems reviewed and are negative.    Objective:     Vital Signs (Most Recent):  Temp: 98.1 °F (36.7 °C) (08/21/20 1141)  Pulse: 78 (08/21/20 1141)  Resp: 17 (08/21/20 1240)  BP: 108/60 (08/21/20 1141)  SpO2: 95 % (08/21/20 1141) Vital Signs (24h Range):  Temp:  [96.5 °F (35.8 °C)-98.2 °F (36.8 °C)] 98.1 °F (36.7 °C)  Pulse:  [62-83] 78  Resp:  [16-20] 17  SpO2:  [94 %-99 %] 95 %  BP: ()/(53-67) 108/60     Weight: 63.5 kg (140 lb)  Body mass index is 21.29 kg/m².    Estimated Creatinine Clearance: 172 mL/min (based on SCr of 0.6 mg/dL).    Physical Exam  Vitals signs and nursing note reviewed.   Constitutional:       General: He is not in acute distress.     Appearance: He is not ill-appearing, toxic-appearing or diaphoretic.      Comments: thin   HENT:      Head: Normocephalic.   Cardiovascular:      Rate and Rhythm: Regular rhythm. Tachycardia present.      Heart sounds: No murmur. No friction rub. No gallop.    Pulmonary:      Effort: Pulmonary effort is normal. No respiratory distress.      Breath sounds: Normal breath sounds. No stridor.   Abdominal:      General: Abdomen is flat. There is no distension.      Palpations: Abdomen is soft. There is no mass.   Musculoskeletal:         General: Swelling and tenderness present. No deformity or signs of injury.   Skin:      General: Skin is warm and dry.      Coloration: Skin is not jaundiced or pale.   Neurological:      General: No focal deficit present.      Mental Status: He is oriented to person, place, and time.   Psychiatric:      Comments: tearful         Significant Labs: All pertinent labs within the past 24 hours have been reviewed.    Significant Imaging: I have reviewed all pertinent imaging results/findings within the past 24 hours.

## 2020-08-21 NOTE — PROGRESS NOTES
"ADDICTION CONSULT FOLLOW UP VISIT     DEPARTMENT:  Psychiatry  SITE: [facility] [address]    DATE OF ADMISSION: 8/19/2020 10:37 AM  LENGTH OF STAY: 1 days    EXAMINING PRACTITIONER: Lev Randhawa      SUBJECTIVE:     HISTORY    Patient Name: Bebo Koroma  YOB: 1995    CHIEF COMPLAINT   Bebo Koroma is a 24 y.o. male who is being seen today for a follow up visit by the addiction psychiatry consult service.  Bebo Koroma presents with the chief complaint of: SIRS, Opioid use disorder    HPI & PSYCHIATRIC ROS    Pt resting in bed, complaining of "10/10 pain" in his R knee, L shoulder.  Reports hx of suboxone started in January 2020.  Reports last clinic visit 4 days ago at unknown facility with unknown physician who had him on 8mg BID (per  last Rx in July).  He likes suboxone as it helps with his cravings.  He also takes prozac and abilify at home which he would like to restart as it helps his mood.  Endorses depressed mood in recent weeks 2/2 medical problems, finances and relationship strife with his gf.  He does not feel like his depressed mood is out of proportion to his life stressors.  Denies SI/HI/AVH.       Difficult to obtain thorough history from as patient was grimacing and wincing 2/2 pain throughout interview.  Pt just had knee scoped last night.       Reports "years" of IVDU, heroin.  Denies substance use otherwise besides cannabis. Denies hx of psych hospitalization or rehab. He is unsure if he would like to pursue rehab post hospitalization.  He continues to groan and becomes tearful which he attributes to his acute knee, shoulder pain.      PFSH: The patient's past medical history has been reviewed and updated as appropriate within the electronic medical record system.    INTERVAL HISTORY:   8/21: Resumed home meds abilify and prozac without issue.  Pt denies SI./HI/AVH, no mood symptoms at this time. Attributes all his frustration to uncontrolled pain.  " "Pt thoughts focused on his reported pain and need for additional pain control.  He expresses frustration with the current spacing of his prns.    He is refusing plan for suboxone at this time.  Pt with plans for furutre washout procedures in OR. Will likely require continued prn opioids for the foreseeable future.          ROS:  Psychiatric Review Of Systems - Is patient experiencing or having changes in:     Symptoms of Depression: diminished mood or loss of interest/anhedonia; irritability, diminished energy, change in sleep, change in appetite, diminished concentration     Symptoms of BEVERLY: endorses mild anxiety, denies persistent worry out of proportion to stressors      Symptoms of alejandrina or hypomania: Denies , no objective signs     Symptoms of psychosis: Denies , no objective signs        Suicidal/Homicidal ideations: Denies      Symptoms of Panic Disorder: Denies      Symptoms of PTSD: Denies      PSYCHOTROPIC MEDICATIONS   None      OBJECTIVE:     EXAMINATION    Vitals:    08/21/20 1049 08/21/20 1141 08/21/20 1240 08/21/20 1420   BP:  108/60     BP Location:  Right arm     Patient Position:  Lying     Pulse:  78     Resp: 18 20 17 17   Temp:  98.1 °F (36.7 °C)     TempSrc:  Oral     SpO2:  95%     Weight:       Height:           CONSTITUTIONAL  General Appearance: Uncomfortable, appears to be in pain    MUSCULOSKELETAL  Abnormal Involuntary Movements: no    PSYCHIATRIC   Orientation: AAOx3  Speech: normal rate, tone, volume  Language: normal, fluid,  no prosody   Mood: "bad"  Affect: tearful  Thought Process: Linear, goal oriented, organized  Associations: intact, logical  Thought Content: No SI/HI/AVH  Memory: 3/3, 3/3  Attention and Concentration: Intact to conversation  Fund of Knowledge: Intact to conversation  Insight: fair  Judgment: appropriate for setting    ASSESSMENT:     DIAGNOSES & PROBLEMS  Opioid use disorder    Patient Active Problem List   Diagnosis    SIRS (systemic inflammatory response " syndrome)    Elevated erythrocyte sedimentation rate    Elevated C-reactive protein (CRP)    IVDU (intravenous drug user)    Oligoarthritis of multiple sites - L shoulder, R knee, R ankle    Anxiety and depression    Septic arthritis of knee, right    Septic arthritis of shoulder, left    Septic arthritis of right ankle    Opioid use disorder, severe, dependence    Opioid withdrawal    Polyarthralgia         PLAN:       MANAGEMENT PLAN, TREATMENT GOALS, THERAPEUTIC TECHNIQUES/APPROACHES & CLINICAL REASONING    Recommend continuing home medications for mod, anxiety:  -Abilify 5mg qd  -Prozac 10mg qd     Pt no longer amenable to suboxone initiation in the hospital.  He expressed frustration with current pain regiment.  Pt is scheduled for upcoming procedures in the OR and will likely require additional pain control over the next few days at least.  Pain regiment per primary team.  Psychiatry will sign off at this time, please re-engage is able to wean off opioids and would like to initiate suboxone here in the hospital.  Pt must be off all opioid medications for atleast 24 hours prior to suboxone initiation.        Opiate Withdrawal Protocol:  -Clonidine 0.1mg PO q4hr PRN for withdrawal associated HTN  -Bentyl 10mg PO q6hr PRN for abdominal muscle cramps  -Loperamide 2mg PO q6hr PRN for diarrhea  -Robaxin 500mg PO q6hr PRN for muscle spasm  -Zofran 4mg ODT q8hr PRN for nausea  -Vistaril 50mg PO qHS PRN for insomnia           · Patient counseled on abstinence from alcohol and substances of abuse (illicit and prescription).  · Additional workup planned to address substance use disorder, in order to guide and refine ongoing management options, includes serial alcohol and drug laboratory testing (e.g. PETH, urine toxicology).  · Relapse prevention and motivational interviewing provided.  · Education provided on 12 step recovery programs.        PRESCRIPTION DRUG MANAGEMENT  - The risks and benefits of  medication were discussed with this patient.  - Possible expectable adverse effects of any current or proposed individual psychotropic agents were discussed with this patient.  - Counseling was provided on the importance of full compliance with medication regimens.         In cases of emergency, daily coverage provided by Acute/ER Psych MD, NP, or SW, with contact numbers located in [facility] On Call Schedule    Case discussed with staff addiction psychiatrist: MAGED LUCERO MD      LABS/IMAGING/STUDIES   Recent Results (from the past 24 hour(s))   Comprehensive Metabolic Panel (CMP)    Collection Time: 08/21/20  4:38 AM   Result Value Ref Range    Sodium 138 136 - 145 mmol/L    Potassium 4.1 3.5 - 5.1 mmol/L    Chloride 102 95 - 110 mmol/L    CO2 29 23 - 29 mmol/L    Glucose 106 70 - 110 mg/dL    BUN, Bld 15 6 - 20 mg/dL    Creatinine 0.6 0.5 - 1.4 mg/dL    Calcium 8.9 8.7 - 10.5 mg/dL    Total Protein 7.1 6.0 - 8.4 g/dL    Albumin 3.0 (L) 3.5 - 5.2 g/dL    Total Bilirubin 0.4 0.1 - 1.0 mg/dL    Alkaline Phosphatase 164 (H) 55 - 135 U/L    AST 16 10 - 40 U/L    ALT 16 10 - 44 U/L    Anion Gap 7 (L) 8 - 16 mmol/L    eGFR if African American >60.0 >60 mL/min/1.73 m^2    eGFR if non African American >60.0 >60 mL/min/1.73 m^2   CBC with Automated Differential    Collection Time: 08/21/20  4:38 AM   Result Value Ref Range    WBC 13.77 (H) 3.90 - 12.70 K/uL    RBC 3.75 (L) 4.60 - 6.20 M/uL    Hemoglobin 11.4 (L) 14.0 - 18.0 g/dL    Hematocrit 34.2 (L) 40.0 - 54.0 %    Mean Corpuscular Volume 91 82 - 98 fL    Mean Corpuscular Hemoglobin 30.4 27.0 - 31.0 pg    Mean Corpuscular Hemoglobin Conc 33.3 32.0 - 36.0 g/dL    RDW 12.6 11.5 - 14.5 %    Platelets 240 150 - 350 K/uL    MPV 11.6 9.2 - 12.9 fL    Immature Granulocytes 1.2 (H) 0.0 - 0.5 %    Gran # (ANC) 10.6 (H) 1.8 - 7.7 K/uL    Immature Grans (Abs) 0.17 (H) 0.00 - 0.04 K/uL    Lymph # 1.6 1.0 - 4.8 K/uL    Mono # 1.3 (H) 0.3 - 1.0 K/uL    Eos # 0.0 0.0 -  0.5 K/uL    Baso # 0.03 0.00 - 0.20 K/uL    nRBC 0 0 /100 WBC    Gran% 77.2 (H) 38.0 - 73.0 %    Lymph% 11.9 (L) 18.0 - 48.0 %    Mono% 9.4 4.0 - 15.0 %    Eosinophil% 0.1 0.0 - 8.0 %    Basophil% 0.2 0.0 - 1.9 %    Differential Method Automated    Phosphorus    Collection Time: 08/21/20  4:38 AM   Result Value Ref Range    Phosphorus 3.4 2.7 - 4.5 mg/dL   Magnesium    Collection Time: 08/21/20  4:38 AM   Result Value Ref Range    Magnesium 2.2 1.6 - 2.6 mg/dL   VANCOMYCIN, TROUGH    Collection Time: 08/21/20  4:38 AM   Result Value Ref Range    Vancomycin-Trough 5.7 (L) 10.0 - 22.0 ug/mL   Sedimentation rate    Collection Time: 08/21/20  4:38 AM   Result Value Ref Range    Sed Rate 90 (H) 0 - 23 mm/Hr   C-Reactive Protein    Collection Time: 08/21/20  4:38 AM   Result Value Ref Range    .6 (H) 0.0 - 8.2 mg/L      Imaging Results          X-Ray Ankle 2 View Right (Final result)  Result time 08/20/20 04:28:30    Final result by Abhi Deng MD (08/20/20 04:28:30)                 Impression:      Mild soft tissue edema about the ankle.  No acute bony abnormality.      Electronically signed by: Abhi Deng MD  Date:    08/20/2020  Time:    04:28             Narrative:    EXAMINATION:  XR ANKLE 2 VIEW RIGHT    CLINICAL HISTORY:  R ankle pain;    TECHNIQUE:  Three views of the right ankle.    COMPARISON:  None.    FINDINGS:  No evidence of acute fracture or dislocation.  No focal bony erosion.  There is mild soft tissue edema about the ankle.  No unexpected radiopaque foreign body.                               X-Ray Knee 3 View Right (Final result)  Result time 08/19/20 22:40:35    Final result by Cuauhtemoc Pearson MD (08/19/20 22:40:35)                 Impression:      Negative right knee.      Electronically signed by: Cuauhtemoc Pearson  Date:    08/19/2020  Time:    22:40             Narrative:    EXAMINATION:  XR KNEE 3 VIEW RIGHT    CLINICAL HISTORY:  R knee pain and swelling;    TECHNIQUE:  AP,  lateral, and Merchant views of the right knee were performed.    COMPARISON:  None    FINDINGS:  The bones, joint spaces and soft tissues are intact without fracture or effusion or dislocation.                               X-Ray Shoulder 2 or More Views Left (Final result)  Result time 08/19/20 15:05:20    Final result by Steve Barron MD (08/19/20 15:05:20)                 Impression:      1. No acute displaced fracture or dislocation of the left shoulder.      Electronically signed by: Steve Barron MD  Date:    08/19/2020  Time:    15:05             Narrative:    EXAMINATION:  XR SHOULDER COMPLETE 2 OR MORE VIEWS LEFT    CLINICAL HISTORY:  shoulder pain;    TECHNIQUE:  Two or three views of the left shoulder were performed.    COMPARISON:  None    FINDINGS:  Three views left shoulder.    The left humeral head maintains appropriate relationship with the glenoid.  The acromioclavicular joint is intact.  No acute displaced left rib fracture.  The left lung zones are clear.                               US Lower Extremity Veins Right (Final result)  Result time 08/19/20 14:10:45    Final result by Steve Barron MD (08/19/20 14:10:45)                 Impression:      No evidence of deep venous thrombosis in the right lower extremity.      Electronically signed by: Steve Barron MD  Date:    08/19/2020  Time:    14:10             Narrative:    EXAMINATION:  US LOWER EXTREMITY VEINS RIGHT    CLINICAL HISTORY:  Other specified soft tissue disorders    TECHNIQUE:  Duplex and color flow Doppler evaluation and graded compression of the right lower extremity veins was performed.    COMPARISON:  None    FINDINGS:  Duplex and color flow Doppler evaluation does not reveal any evidence of acute venous thrombosis in the common femoral, superficial femoral, greater saphenous, popliteal, peroneal, anterior tibial and posterior tibial veins of the right lower extremity.  There is no reflux to suggest valvular  incompetence.                               X-Ray Chest AP Portable (Final result)  Result time 08/19/20 11:42:19    Final result by Wilbert Munoz MD (08/19/20 11:42:19)                 Impression:      See above      Electronically signed by: Wilbert Munoz MD  Date:    08/19/2020  Time:    11:42             Narrative:    EXAMINATION:  XR CHEST AP PORTABLE    CLINICAL HISTORY:  Sepsis;    TECHNIQUE:  Single frontal view of the chest was performed.    COMPARISON:  None    FINDINGS:  Heart size normal.  The lungs are clear.  No pleural effusion

## 2020-08-21 NOTE — CARE UPDATE
Patient in severe left shoulder pain, screaming and throwing objects in his room.  Joint was not bad in OR and he does not have significant effusion, erythema or any drainage from his wounds.  Hes is an IV drug abuser and his response to nociception will be altered/amplified.  He also has poor coping mechanisms.  He is on scheduled Tylenol but I added scheduled Robaxin, motrin and gabapentin as well as low dose Elavil at night.  Keeping scheduled multimodal analgesia will be important for him.  If he has any further issues, I will have the anesthesia pain management service see him as well.      Kamran eLvy MD

## 2020-08-21 NOTE — PLAN OF CARE
Problem: Physical Therapy Goal  Goal: Physical Therapy Goal  Description: Goals to be met by: 20     Patient will increase functional independence with mobility by performin. Bed mobility with independence  2. Sit to stand transfer with Modified [place]  3. Bed to chair transfer with Modified [place] using LRAD  4. Gait  x 150 feet with Supervision using LRAD.   5. Ascend/descend 4 stair with right Handrails Supervision using LRAD  .     Outcome: Ongoing, Progressing     Initial Eval performed and PT POC established this date.   Orin Chiang, PT, DPT  2020

## 2020-08-22 LAB
ALBUMIN SERPL BCP-MCNC: 2.9 G/DL (ref 3.5–5.2)
ALP SERPL-CCNC: 72 U/L (ref 55–135)
ALT SERPL W/O P-5'-P-CCNC: 13 U/L (ref 10–44)
ANION GAP SERPL CALC-SCNC: 12 MMOL/L (ref 8–16)
AST SERPL-CCNC: 12 U/L (ref 10–40)
BACTERIA SPEC AEROBE CULT: NO GROWTH
BASOPHILS # BLD AUTO: 0.02 K/UL (ref 0–0.2)
BASOPHILS NFR BLD: 0.3 % (ref 0–1.9)
BILIRUB SERPL-MCNC: 0.4 MG/DL (ref 0.1–1)
BUN SERPL-MCNC: 11 MG/DL (ref 6–20)
C TRACH RRNA SPEC QL NAA+PROBE: NEGATIVE
CALCIUM SERPL-MCNC: 8.9 MG/DL (ref 8.7–10.5)
CHLORIDE SERPL-SCNC: 103 MMOL/L (ref 95–110)
CO2 SERPL-SCNC: 25 MMOL/L (ref 23–29)
CREAT SERPL-MCNC: 0.7 MG/DL (ref 0.5–1.4)
CRP SERPL-MCNC: 93 MG/L (ref 0–8.2)
DIFFERENTIAL METHOD: ABNORMAL
EOSINOPHIL # BLD AUTO: 0.1 K/UL (ref 0–0.5)
EOSINOPHIL NFR BLD: 0.9 % (ref 0–8)
ERYTHROCYTE [DISTWIDTH] IN BLOOD BY AUTOMATED COUNT: 12.9 % (ref 11.5–14.5)
ERYTHROCYTE [SEDIMENTATION RATE] IN BLOOD BY WESTERGREN METHOD: 95 MM/HR (ref 0–23)
EST. GFR  (AFRICAN AMERICAN): >60 ML/MIN/1.73 M^2
EST. GFR  (NON AFRICAN AMERICAN): >60 ML/MIN/1.73 M^2
GLUCOSE SERPL-MCNC: 96 MG/DL (ref 70–110)
HCT VFR BLD AUTO: 34.6 % (ref 40–54)
HGB BLD-MCNC: 11.4 G/DL (ref 14–18)
IMM GRANULOCYTES # BLD AUTO: 0.12 K/UL (ref 0–0.04)
IMM GRANULOCYTES NFR BLD AUTO: 1.6 % (ref 0–0.5)
LYMPHOCYTES # BLD AUTO: 2 K/UL (ref 1–4.8)
LYMPHOCYTES NFR BLD: 27.1 % (ref 18–48)
MAGNESIUM SERPL-MCNC: 1.8 MG/DL (ref 1.6–2.6)
MCH RBC QN AUTO: 30.6 PG (ref 27–31)
MCHC RBC AUTO-ENTMCNC: 32.9 G/DL (ref 32–36)
MCV RBC AUTO: 93 FL (ref 82–98)
MONOCYTES # BLD AUTO: 1 K/UL (ref 0.3–1)
MONOCYTES NFR BLD: 13 % (ref 4–15)
N GONORRHOEA RRNA SPEC QL NAA+PROBE: NEGATIVE
NEUTROPHILS # BLD AUTO: 4.3 K/UL (ref 1.8–7.7)
NEUTROPHILS NFR BLD: 57.1 % (ref 38–73)
NRBC BLD-RTO: 0 /100 WBC
PHOSPHATE SERPL-MCNC: 4.7 MG/DL (ref 2.7–4.5)
PLATELET # BLD AUTO: 278 K/UL (ref 150–350)
PMV BLD AUTO: 11 FL (ref 9.2–12.9)
POTASSIUM SERPL-SCNC: 3.2 MMOL/L (ref 3.5–5.1)
PROT SERPL-MCNC: 7 G/DL (ref 6–8.4)
RBC # BLD AUTO: 3.72 M/UL (ref 4.6–6.2)
SODIUM SERPL-SCNC: 140 MMOL/L (ref 136–145)
VANCOMYCIN TROUGH SERPL-MCNC: 10.9 UG/ML (ref 10–22)
WBC # BLD AUTO: 7.45 K/UL (ref 3.9–12.7)

## 2020-08-22 PROCEDURE — 63600175 PHARM REV CODE 636 W HCPCS: Performed by: STUDENT IN AN ORGANIZED HEALTH CARE EDUCATION/TRAINING PROGRAM

## 2020-08-22 PROCEDURE — 25000003 PHARM REV CODE 250: Performed by: STUDENT IN AN ORGANIZED HEALTH CARE EDUCATION/TRAINING PROGRAM

## 2020-08-22 PROCEDURE — 63600175 PHARM REV CODE 636 W HCPCS: Performed by: INTERNAL MEDICINE

## 2020-08-22 PROCEDURE — 25000003 PHARM REV CODE 250: Performed by: ORTHOPAEDIC SURGERY

## 2020-08-22 PROCEDURE — 99232 PR SUBSEQUENT HOSPITAL CARE,LEVL II: ICD-10-PCS | Mod: ,,, | Performed by: INTERNAL MEDICINE

## 2020-08-22 PROCEDURE — 80053 COMPREHEN METABOLIC PANEL: CPT

## 2020-08-22 PROCEDURE — 11000001 HC ACUTE MED/SURG PRIVATE ROOM

## 2020-08-22 PROCEDURE — 80202 ASSAY OF VANCOMYCIN: CPT

## 2020-08-22 PROCEDURE — 85025 COMPLETE CBC W/AUTO DIFF WBC: CPT

## 2020-08-22 PROCEDURE — S4991 NICOTINE PATCH NONLEGEND: HCPCS | Performed by: STUDENT IN AN ORGANIZED HEALTH CARE EDUCATION/TRAINING PROGRAM

## 2020-08-22 PROCEDURE — 25000003 PHARM REV CODE 250: Performed by: INTERNAL MEDICINE

## 2020-08-22 PROCEDURE — 83735 ASSAY OF MAGNESIUM: CPT

## 2020-08-22 PROCEDURE — 36415 COLL VENOUS BLD VENIPUNCTURE: CPT

## 2020-08-22 PROCEDURE — 84100 ASSAY OF PHOSPHORUS: CPT

## 2020-08-22 PROCEDURE — 86140 C-REACTIVE PROTEIN: CPT

## 2020-08-22 PROCEDURE — 85652 RBC SED RATE AUTOMATED: CPT

## 2020-08-22 PROCEDURE — 99232 SBSQ HOSP IP/OBS MODERATE 35: CPT | Mod: ,,, | Performed by: INTERNAL MEDICINE

## 2020-08-22 RX ORDER — IBUPROFEN 200 MG
1 TABLET ORAL DAILY
Status: DISCONTINUED | OUTPATIENT
Start: 2020-08-22 | End: 2020-09-01 | Stop reason: HOSPADM

## 2020-08-22 RX ORDER — POTASSIUM CHLORIDE 20 MEQ/1
40 TABLET, EXTENDED RELEASE ORAL
Status: COMPLETED | OUTPATIENT
Start: 2020-08-22 | End: 2020-08-22

## 2020-08-22 RX ORDER — HYDROMORPHONE HYDROCHLORIDE 1 MG/ML
2 INJECTION, SOLUTION INTRAMUSCULAR; INTRAVENOUS; SUBCUTANEOUS ONCE
Status: COMPLETED | OUTPATIENT
Start: 2020-08-22 | End: 2020-08-22

## 2020-08-22 RX ADMIN — METHOCARBAMOL TABLETS 750 MG: 750 TABLET, COATED ORAL at 05:08

## 2020-08-22 RX ADMIN — GABAPENTIN 300 MG: 300 CAPSULE ORAL at 09:08

## 2020-08-22 RX ADMIN — HYDROMORPHONE HYDROCHLORIDE 1 MG: 1 INJECTION, SOLUTION INTRAMUSCULAR; INTRAVENOUS; SUBCUTANEOUS at 12:08

## 2020-08-22 RX ADMIN — GABAPENTIN 300 MG: 300 CAPSULE ORAL at 08:08

## 2020-08-22 RX ADMIN — NICOTINE 1 PATCH: 21 PATCH, EXTENDED RELEASE TRANSDERMAL at 05:08

## 2020-08-22 RX ADMIN — DEXTROSE MONOHYDRATE 1250 MG: 50 INJECTION, SOLUTION INTRAVENOUS at 12:08

## 2020-08-22 RX ADMIN — OXYCODONE HYDROCHLORIDE 10 MG: 10 TABLET ORAL at 07:08

## 2020-08-22 RX ADMIN — IBUPROFEN 800 MG: 400 TABLET, FILM COATED ORAL at 08:08

## 2020-08-22 RX ADMIN — IBUPROFEN 800 MG: 400 TABLET, FILM COATED ORAL at 04:08

## 2020-08-22 RX ADMIN — ASPIRIN 81 MG: 81 TABLET, COATED ORAL at 08:08

## 2020-08-22 RX ADMIN — METHOCARBAMOL TABLETS 750 MG: 750 TABLET, COATED ORAL at 12:08

## 2020-08-22 RX ADMIN — HYDROMORPHONE HYDROCHLORIDE 1 MG: 1 INJECTION, SOLUTION INTRAMUSCULAR; INTRAVENOUS; SUBCUTANEOUS at 05:08

## 2020-08-22 RX ADMIN — MELATONIN TAB 3 MG 6 MG: 3 TAB at 09:08

## 2020-08-22 RX ADMIN — CEFTRIAXONE 2 G: 2 INJECTION, SOLUTION INTRAVENOUS at 08:08

## 2020-08-22 RX ADMIN — OXYCODONE HYDROCHLORIDE 10 MG: 10 TABLET ORAL at 09:08

## 2020-08-22 RX ADMIN — ARIPIPRAZOLE 5 MG: 5 TABLET ORAL at 08:08

## 2020-08-22 RX ADMIN — OXYCODONE HYDROCHLORIDE 10 MG: 10 TABLET ORAL at 01:08

## 2020-08-22 RX ADMIN — HYDROMORPHONE HYDROCHLORIDE 1 MG: 1 INJECTION, SOLUTION INTRAMUSCULAR; INTRAVENOUS; SUBCUTANEOUS at 04:08

## 2020-08-22 RX ADMIN — OXYCODONE HYDROCHLORIDE 10 MG: 10 TABLET ORAL at 05:08

## 2020-08-22 RX ADMIN — HYDROXYZINE PAMOATE 50 MG: 25 CAPSULE ORAL at 09:08

## 2020-08-22 RX ADMIN — ACETAMINOPHEN 650 MG: 325 TABLET ORAL at 08:08

## 2020-08-22 RX ADMIN — LORAZEPAM 0.5 MG: 0.5 TABLET ORAL at 09:08

## 2020-08-22 RX ADMIN — ACETAMINOPHEN 650 MG: 325 TABLET ORAL at 04:08

## 2020-08-22 RX ADMIN — POTASSIUM CHLORIDE 40 MEQ: 1500 TABLET, EXTENDED RELEASE ORAL at 08:08

## 2020-08-22 RX ADMIN — VANCOMYCIN HYDROCHLORIDE 1000 MG: 1 INJECTION, POWDER, LYOPHILIZED, FOR SOLUTION INTRAVENOUS at 05:08

## 2020-08-22 RX ADMIN — FLUOXETINE 10 MG: 10 CAPSULE ORAL at 08:08

## 2020-08-22 RX ADMIN — HYDROMORPHONE HYDROCHLORIDE 2 MG: 1 INJECTION, SOLUTION INTRAMUSCULAR; INTRAVENOUS; SUBCUTANEOUS at 08:08

## 2020-08-22 RX ADMIN — HYDROMORPHONE HYDROCHLORIDE 1 MG: 1 INJECTION, SOLUTION INTRAMUSCULAR; INTRAVENOUS; SUBCUTANEOUS at 02:08

## 2020-08-22 RX ADMIN — DEXTROSE MONOHYDRATE 1250 MG: 50 INJECTION, SOLUTION INTRAVENOUS at 09:08

## 2020-08-22 RX ADMIN — HYDROMORPHONE HYDROCHLORIDE 1 MG: 1 INJECTION, SOLUTION INTRAMUSCULAR; INTRAVENOUS; SUBCUTANEOUS at 06:08

## 2020-08-22 RX ADMIN — HYDROMORPHONE HYDROCHLORIDE 1 MG: 1 INJECTION, SOLUTION INTRAMUSCULAR; INTRAVENOUS; SUBCUTANEOUS at 09:08

## 2020-08-22 RX ADMIN — PANTOPRAZOLE SODIUM 40 MG: 40 TABLET, DELAYED RELEASE ORAL at 08:08

## 2020-08-22 RX ADMIN — POTASSIUM CHLORIDE 40 MEQ: 1500 TABLET, EXTENDED RELEASE ORAL at 12:08

## 2020-08-22 RX ADMIN — IBUPROFEN 800 MG: 400 TABLET, FILM COATED ORAL at 09:08

## 2020-08-22 RX ADMIN — AMITRIPTYLINE HYDROCHLORIDE 25 MG: 25 TABLET, FILM COATED ORAL at 09:08

## 2020-08-22 RX ADMIN — POTASSIUM CHLORIDE 40 MEQ: 1500 TABLET, EXTENDED RELEASE ORAL at 11:08

## 2020-08-22 NOTE — ASSESSMENT & PLAN NOTE
Pt reports use of IV drugs since the age of 17. States he has consistently used them for 5 years and became sober 6 months ago. He is on Subox-one at home which he last took 3 days ago. He relapsed last week. Last IV opioid use was 3 days ago. Patient has a 1 week old  at home that could be contributing to the relapse. On exam he was tremulous with mildly dilated pupils. He reports recent sweating and cold chills. He denies N/V diarrhea. With his chronic use, he will likely be hyper-analgesic.     Plan:  -- Clinical Opiate Withdrawal Scale: 4  -- Ativan 0.5 mg PRN  -- Psych consulted for addiction management. Following recs. See opioid dependence.  -- Continue to monitor for signs of withdrawl

## 2020-08-22 NOTE — NURSING
Pt screaming and yelling during the beginning of the night about pain to left shoulder. Pt medicated with prn pain medication as ordered. Free from falls and injuries. Will continue to monitor.

## 2020-08-22 NOTE — ASSESSMENT & PLAN NOTE
"24 year old M with a PMHx of IV drug abuse and depression presented to the ED complaining of left shoulder pain, right knee pain, and right ankle pain. On arrival temp 101.4, pulse 111, elevated CRP and Sed rate. WBC wnl. He has significant decreased ROM of left shoulder and swelling of right knee. Infectious workup started in the ED. Vanc and zosyn given in the ED.    Xray left shoulder, R knee, R ankle, CXR, and US right lower leg: no significant findings    POD 3: arthroscopy R knee and L shoulder, arthrotomy R ankle -  per ortho op-note "I think this is either a very early case of septic arthritis of multiple joints versus inflammatory poly arthritis. Given his febrile state and IV drug abuse I think he should be treated as though the septic arthritis even if his cultures fail to grow anything."    Plan:   -- Ortho consulted for septic arthritis r/o. Follwing recs  -- Aspiration of joints: R knee (31K WBC, 90% segs) R ankle (600 WBC, 43% segs), L shoulder clotted  -- Fluid aspiration gram stain pending  -- Continue Rocephin and Vanc  -- ID consulted continue to appreciate recs  -- Blood cultures NGTD  -- HIV negative  -- Hepatitis C antibody positive so will need follow up with Infectious Disease in [facility] as outpatient  -- Procalcitonin 0.67      "

## 2020-08-22 NOTE — PROGRESS NOTES
[facility]  Orthopedics  Progress Note    Patient Name: Bebo Koroma  MRN: [id number]  Admission Date: 8/19/2020  Hospital Length of Stay: 2 days  Attending Provider: Ronna Greene MD  Primary Care Provider: Primary Doctor No  Follow-up For: Procedure(s) (LRB):  ARTHROSCOPY, KNEE - cultures & 9 liters saline - arthroscopic leg lozada  (Right)  ARTHROSCOPY, SHOULDER - cultures & 9 liters saline (Left)  ARTHROTOMY, ANKLE - cultures & 9 liters of saline - cysto tubing (Right)    Post-Operative Day: 3 Days Post-Op  Subjective:     Principal Problem:SIRS (systemic inflammatory response syndrome)    Principal Orthopedic Problem: s/p L shoulder arthroscopy, R knee arthroscopy and R ankle arthrotomy on 8/19/20    Interval History: The patient was seen and examined at the bedside. NAEON. Tolerating PO intake. VSS. Pain improved significantly in R knee and R ankle, able to ambulate without pain. Still having significant L shoulder pain. K+ 3.2 this AM      Review of patient's allergies indicates:   Allergen Reactions    Penicillins Hives     Unclear of last episode of allergic reaction       Current Facility-Administered Medications   Medication    acetaminophen tablet 650 mg    amitriptyline tablet 25 mg    ARIPiprazole tablet 5 mg    aspirin EC tablet 81 mg    cefTRIAXone (ROCEPHIN) 2 g/50 mL D5W IVPB    cloNIDine tablet 0.1 mg    dextrose 50% injection 12.5 g    dextrose 50% injection 25 g    dicyclomine capsule 10 mg    FLUoxetine capsule 10 mg    gabapentin capsule 300 mg    glucagon (human recombinant) injection 1 mg    glucose chewable tablet 16 g    glucose chewable tablet 24 g    HYDROmorphone injection 1 mg    hydrOXYzine pamoate capsule 50 mg    ibuprofen tablet 800 mg    loperamide capsule 2 mg    LORazepam tablet 0.5 mg    melatonin tablet 6 mg    methocarbamoL tablet 750 mg    naloxone 0.4 mg/mL injection 0.4 mg    ondansetron disintegrating tablet 8 mg     "oxyCODONE immediate release tablet Tab 10 mg    pantoprazole EC tablet 40 mg    potassium chloride SA CR tablet 40 mEq    prochlorperazine tablet 10 mg    senna-docusate 8.6-50 mg per tablet 1 tablet    sodium chloride 0.9% flush 10 mL    vancomycin 1.25 g in dextrose 5% 250 mL IVPB (ready to mix)     Objective:     Vital Signs (Most Recent):  Temp: 99.5 °F (37.5 °C) (08/22/20 0738)  Pulse: 89 (08/22/20 0738)  Resp: 17 (08/22/20 0803)  BP: 120/71 (08/22/20 0738)  SpO2: 100 % (08/22/20 0738) Vital Signs (24h Range):  Temp:  [97.9 °F (36.6 °C)-99.5 °F (37.5 °C)] 99.5 °F (37.5 °C)  Pulse:  [71-89] 89  Resp:  [1-24] 17  SpO2:  [95 %-100 %] 100 %  BP: (108-126)/(60-79) 120/71     Weight: 63.5 kg (140 lb)  Height: 5' 8" (172.7 cm)  Body mass index is 21.29 kg/m².      Intake/Output Summary (Last 24 hours) at 8/22/2020 0918  Last data filed at 8/21/2020 1836  Gross per 24 hour   Intake 360 ml   Output --   Net 360 ml       Ortho/SPM Exam     LUE  Dressings c,d,i  Pain with passive shoulder ROM  SILT M/U/R  Motor intact AIN/PIN/M/U/R   Cap refill < 2s  2+ RP    RLE:  Dressings to ankle and knee c,d,i  Soreness with ankle and knee passive  SILT Sa/Bell/DP/SP/T  Motor intact EHL/FHL/TA/Gastroc  2+ DP, 2+ PT          Significant Labs:   CBC:   Recent Labs   Lab 08/21/20 0438 08/22/20 0442   WBC 13.77* 7.45   HGB 11.4* 11.4*   HCT 34.2* 34.6*    278     CMP:   Recent Labs   Lab 08/21/20 0438 08/22/20 0442    140   K 4.1 3.2*    103   CO2 29 25    96   BUN 15 11   CREATININE 0.6 0.7   CALCIUM 8.9 8.9   PROT 7.1 7.0   ALBUMIN 3.0* 2.9*   BILITOT 0.4 0.4   ALKPHOS 164* 72   AST 16 12   ALT 16 13   ANIONGAP 7* 12   EGFRNONAA >60.0 >60.0     CRP:   Recent Labs   Lab 08/21/20  0438   .6*     All pertinent labs within the past 24 hours have been reviewed.    Significant Imaging: I have reviewed all pertinent imaging results/findings.    Assessment/Plan:     Septic arthritis of knee, " right  Bebo Koroma is a 24 y.o. male IVDU presenting with fevers, left shoulder pain, right knee pain, right ankle pain s/p L shoulder arthroscopy, R knee arthroscopy and R ankle arthrotomy on 8/19/20. R knee and R ankle pain well controlled. L shoulder pain significant with ROM. Continue iv abx per ID recs. No plans for operative intervention at this time.     -Trend ESR and CRP daily  -Follow cultures, ngtd  -PT/OT WBAT in all joints  -Abx, continue vanc/rocephin;  ID recs once cx and sensitivities  -DVT asa 81 daily per medicine team  -No plans for additional operative intervention at this time.                Tian Bucio MD  Orthopedics  [facility]

## 2020-08-22 NOTE — NURSING
Patient alert and oriented X4. Pt was screaming and throwing objects in the room complaining of severe left shoulder pain.  On call MD notified.  All schedule and PRN pain medication given. Orthopedic MD adjusted pain management medication.  No falls or injuries during shift.

## 2020-08-22 NOTE — PROGRESS NOTES
Pharmacokinetic Assessment Follow Up: IV Vancomycin    Vancomycin serum concentration assessment(s):  · Trough of 10.9 mcg/mL was drawn appropriately and will be used to guide therapy  · Subtherapeutic level for target of 15-20 mcg/mL for septic arthritis of knee  · Renal function stable    Vancomycin Regimen Plan:  1. Will increase dose to 1250mg every 8 hours  2. Obtain trough prior to dose on 8/23 @ 1100    Drug levels (last 3 results):  Recent Labs   Lab Result Units 08/21/20  0438 08/22/20  0442   Vancomycin-Trough ug/mL 5.7* 10.9       Pharmacy will continue to follow and monitor vancomycin.    Please contact pharmacy at extension [phone] for questions regarding this assessment.    Thank you for the consult,   Cris Grace       Patient brief summary:  Bebo Koroma is a 24 y.o. male initiated on antimicrobial therapy with IV Vancomycin for treatment of bone/joint infection    The patient's current regimen is 1000mg q8 hours    Drug Allergies:   Review of patient's allergies indicates:   Allergen Reactions    Penicillins Hives     Unclear of last episode of allergic reaction       Actual Body Weight:   63.5 kg    Renal Function:   Estimated Creatinine Clearance: 147.4 mL/min (based on SCr of 0.7 mg/dL).,       CBC (last 72 hours):  Recent Labs   Lab Result Units 08/19/20  1140 08/20/20  0454 08/21/20  0438 08/22/20  0442   WBC K/uL 9.05 11.07 13.77* 7.45   Hemoglobin g/dL 13.7* 11.7* 11.4* 11.4*   Hematocrit % 40.2 34.7* 34.2* 34.6*   Platelets K/uL 141* 154 240 278   Gran% % 65.9 81.1* 77.2* 57.1   Lymph% % 20.4 12.5* 11.9* 27.1   Mono% % 12.5 5.1 9.4 13.0   Eosinophil% % 0.1 0.1 0.1 0.9   Basophil% % 0.3 0.2 0.2 0.3   Differential Method  Automated Automated Automated Automated       Metabolic Panel (last 72 hours):  Recent Labs   Lab Result Units 08/19/20  1140 08/19/20  1141 08/20/20  0129 08/20/20  0454 08/21/20  0438 08/22/20  0442   Sodium mmol/L 134*  --   --  134* 138 140   Potassium mmol/L 3.9  --    --  4.1 4.1 3.2*   Chloride mmol/L 97  --   --  100 102 103   CO2 mmol/L 27  --   --  28 29 25   Glucose mg/dL 92  --   --  216* 106 96   Glucose, UA   --  Negative  --   --   --   --    BUN, Bld mg/dL 9  --   --  7 15 11   Creatinine mg/dL 0.8  --   --  0.7 0.6 0.7   Creatinine, Random Ur mg/dL  --   --  52.0  --   --   --    Albumin g/dL 3.6  --   --  3.0* 3.0* 2.9*   Total Bilirubin mg/dL 0.9  --   --  0.6 0.4 0.4   Alkaline Phosphatase U/L 110  --   --  91 164* 72   AST U/L 20  --   --  16 16 12   ALT U/L 24  --   --  18 16 13   Magnesium mg/dL  --   --   --  2.1 2.2 1.8   Phosphorus mg/dL  --   --   --  3.0 3.4 4.7*       Vancomycin Administrations:  vancomycin given in the last 96 hours                   vancomycin in dextrose 5 % 1 gram/250 mL IVPB 1,000 mg (mg) 1,000 mg New Bag 08/22/20 0556     1,000 mg New Bag 08/21/20 2157     1,000 mg New Bag  1420    vancomycin in dextrose 5 % 1 gram/250 mL IVPB 1,000 mg (mg) 1,000 mg New Bag 08/21/20 0534     1,000 mg New Bag 08/20/20 1557     1,000 mg New Bag  0211    vancomycin injection (g) 3 g Given 08/19/20 2325    vancomycin 1.25 g in dextrose 5% 250 mL IVPB (ready to mix) (mg) 1,250 mg New Bag 08/19/20 1251                Microbiologic Results:  Microbiology Results (last 7 days)     Procedure Component Value Units Date/Time    Blood culture [[id number]] Collected: 08/19/20 1644    Order Status: Completed Specimen: Blood from Peripheral, Antecubital, Right Updated: 08/21/20 1822     Blood Culture, Routine No Growth to date      No Growth to date      No Growth to date    Blood culture x two cultures. Draw prior to antibiotics. [[id number]] Collected: 08/19/20 1140    Order Status: Completed Specimen: Blood from Peripheral, Antecubital, Right Updated: 08/21/20 1412     Blood Culture, Routine No Growth to date      No Growth to date      No Growth to date    Narrative:      Aerobic and anaerobic    Blood culture x two cultures. Draw prior to antibiotics.  [[id number]] Collected: 08/19/20 1141    Order Status: Completed Specimen: Blood from Peripheral, Forearm, Right Updated: 08/21/20 1412     Blood Culture, Routine No Growth to date      No Growth to date      No Growth to date    Narrative:      Aerobic and anaerobic    Culture, Anaerobic [[id number]] Collected: 08/19/20 1805    Order Status: Completed Specimen: Joint Fluid from Shoulder, Left Updated: 08/21/20 1118     Anaerobic Culture Culture in progress    Culture, Anaerobic [[id number]] Collected: 08/19/20 1804    Order Status: Completed Specimen: Joint Fluid from Ankle, Right Updated: 08/21/20 1117     Anaerobic Culture Culture in progress    Culture, Anaerobic [[id number]] Collected: 08/19/20 1802    Order Status: Completed Specimen: Joint Fluid from Knee, Right Updated: 08/21/20 1116     Anaerobic Culture Culture in progress    AFB Culture & Smear [[id number]] Collected: 08/19/20 1804    Order Status: Completed Specimen: Joint Fluid from Ankle, Right Updated: 08/20/20 2127     AFB Culture & Smear Culture in progress     AFB CULTURE STAIN No acid fast bacilli seen.    AFB Culture & Smear [[id number]] Collected: 08/19/20 1805    Order Status: Completed Specimen: Joint Fluid from Shoulder, Left Updated: 08/20/20 2127     AFB Culture & Smear Culture in progress     AFB CULTURE STAIN No acid fast bacilli seen.    AFB Culture & Smear [[id number]] Collected: 08/19/20 1802    Order Status: Completed Specimen: Joint Fluid from Knee, Right Updated: 08/20/20 2127     AFB Culture & Smear Culture in progress     AFB CULTURE STAIN No acid fast bacilli seen.    Chlamydia/Neisseria gonorrhoeae RNA, TMA [[id number]] Collected: 08/19/20 1140    Order Status: No result Updated: 08/20/20 0902    Aerobic culture [[id number]] Collected: 08/19/20 1802    Order Status: Completed Specimen: Joint Fluid from Knee, Right Updated: 08/20/20 0722     Aerobic Bacterial Culture No growth    Aerobic culture [[id number]] Collected: 08/19/20 1805     Order Status: Completed Specimen: Joint Fluid from Shoulder, Left Updated: 08/20/20 0722     Aerobic Bacterial Culture No growth    Aerobic culture [[id number]] Collected: 08/19/20 1804    Order Status: Completed Specimen: Joint Fluid from Ankle, Right Updated: 08/20/20 0722     Aerobic Bacterial Culture No growth    Gram stain [[id number]] Collected: 08/19/20 1802    Order Status: Completed Specimen: Joint Fluid from Knee, Right Updated: 08/19/20 1932     Gram Stain Result Rare WBC's      No organisms seen    Gram stain [[id number]] Collected: 08/19/20 1804    Order Status: Completed Specimen: Joint Fluid from Ankle, Right Updated: 08/19/20 1930     Gram Stain Result Rare WBC's      No organisms seen    Gram stain [[id number]] Collected: 08/19/20 1805    Order Status: Completed Specimen: Joint Fluid from Shoulder, Left Updated: 08/19/20 1929     Gram Stain Result No WBC's      No organisms seen    Fungus culture [[id number]] Collected: 08/19/20 1802    Order Status: Sent Specimen: Joint Fluid from Knee, Right Updated: 08/19/20 1816    Fungus culture [[id number]] Collected: 08/19/20 1804    Order Status: Sent Specimen: Joint Fluid from Ankle, Right Updated: 08/19/20 1813    Fungus culture [[id number]] Collected: 08/19/20 1805    Order Status: Sent Specimen: Joint Fluid from Shoulder, Left Updated: 08/19/20 1811    C. trachomatis/N. gonorrhoeae by AMP DNA [[id number]] Collected: 08/19/20 1140    Order Status: Canceled Specimen: Genital

## 2020-08-22 NOTE — SUBJECTIVE & OBJECTIVE
Principal Problem:SIRS (systemic inflammatory response syndrome)    Principal Orthopedic Problem: s/p L shoulder arthroscopy, R knee arthroscopy and R ankle arthrotomy on 8/19/20    Interval History: The patient was seen and examined at the bedside. NAEON. Tolerating PO intake. VSS. Pain improved significantly in R knee and R ankle, able to ambulate without pain. Still having significant L shoulder pain. K+ 3.2 this AM      Review of patient's allergies indicates:   Allergen Reactions    Penicillins Hives     Unclear of last episode of allergic reaction       Current Facility-Administered Medications   Medication    acetaminophen tablet 650 mg    amitriptyline tablet 25 mg    ARIPiprazole tablet 5 mg    aspirin EC tablet 81 mg    cefTRIAXone (ROCEPHIN) 2 g/50 mL D5W IVPB    cloNIDine tablet 0.1 mg    dextrose 50% injection 12.5 g    dextrose 50% injection 25 g    dicyclomine capsule 10 mg    FLUoxetine capsule 10 mg    gabapentin capsule 300 mg    glucagon (human recombinant) injection 1 mg    glucose chewable tablet 16 g    glucose chewable tablet 24 g    HYDROmorphone injection 1 mg    hydrOXYzine pamoate capsule 50 mg    ibuprofen tablet 800 mg    loperamide capsule 2 mg    LORazepam tablet 0.5 mg    melatonin tablet 6 mg    methocarbamoL tablet 750 mg    naloxone 0.4 mg/mL injection 0.4 mg    ondansetron disintegrating tablet 8 mg    oxyCODONE immediate release tablet Tab 10 mg    pantoprazole EC tablet 40 mg    potassium chloride SA CR tablet 40 mEq    prochlorperazine tablet 10 mg    senna-docusate 8.6-50 mg per tablet 1 tablet    sodium chloride 0.9% flush 10 mL    vancomycin 1.25 g in dextrose 5% 250 mL IVPB (ready to mix)     Objective:     Vital Signs (Most Recent):  Temp: 99.5 °F (37.5 °C) (08/22/20 0738)  Pulse: 89 (08/22/20 0738)  Resp: 17 (08/22/20 0803)  BP: 120/71 (08/22/20 0738)  SpO2: 100 % (08/22/20 0738) Vital Signs (24h Range):  Temp:  [97.9 °F (36.6 °C)-99.5 °F  "(37.5 °C)] 99.5 °F (37.5 °C)  Pulse:  [71-89] 89  Resp:  [1-24] 17  SpO2:  [95 %-100 %] 100 %  BP: (108-126)/(60-79) 120/71     Weight: 63.5 kg (140 lb)  Height: 5' 8" (172.7 cm)  Body mass index is 21.29 kg/m².      Intake/Output Summary (Last 24 hours) at 8/22/2020 0918  Last data filed at 8/21/2020 1836  Gross per 24 hour   Intake 360 ml   Output --   Net 360 ml       Ortho/SPM Exam     LUE  Dressings c,d,i  Pain with passive shoulder ROM  SILT M/U/R  Motor intact AIN/PIN/M/U/R   Cap refill < 2s  2+ RP    RLE:  Dressings to ankle and knee c,d,i  Soreness with ankle and knee passive  SILT Sa/Bell/DP/SP/T  Motor intact EHL/FHL/TA/Gastroc  2+ DP, 2+ PT          Significant Labs:   CBC:   Recent Labs   Lab 08/21/20 0438 08/22/20 0442   WBC 13.77* 7.45   HGB 11.4* 11.4*   HCT 34.2* 34.6*    278     CMP:   Recent Labs   Lab 08/21/20 0438 08/22/20 0442    140   K 4.1 3.2*    103   CO2 29 25    96   BUN 15 11   CREATININE 0.6 0.7   CALCIUM 8.9 8.9   PROT 7.1 7.0   ALBUMIN 3.0* 2.9*   BILITOT 0.4 0.4   ALKPHOS 164* 72   AST 16 12   ALT 16 13   ANIONGAP 7* 12   EGFRNONAA >60.0 >60.0     CRP:   Recent Labs   Lab 08/21/20 0438   .6*     All pertinent labs within the past 24 hours have been reviewed.    Significant Imaging: I have reviewed all pertinent imaging results/findings.  "

## 2020-08-22 NOTE — SUBJECTIVE & OBJECTIVE
Interval History: Patient seen and examined this AM. Patient agitated yesterday evening regarding current pain regimen. He expressed frustration with nursing staff this AM and even threatened to leave AMA despite extensive conversation regarding his current plan of care. Acute Pain Management service consult placed today for assistance. He is now post op day two of his orthopedic procedures. He continues to have complaints of left shoulder pain at this time although mildly improved this AM from day prior. Orthopedic Surgery with no plans for further surgical intervention at this time. His cultures remain without growth at this time. He is resume on vancomycin and Rocephin per ID.    Review of Systems   Constitutional: Positive for appetite change. Negative for chills, diaphoresis and fever.   HENT: Negative for congestion, sore throat and trouble swallowing.    Eyes: Negative for visual disturbance.   Respiratory: Negative for cough, shortness of breath and wheezing.    Cardiovascular: Negative for chest pain and palpitations.   Gastrointestinal: Negative for abdominal distention, abdominal pain, constipation, diarrhea, nausea and vomiting.   Genitourinary: Negative for difficulty urinating and dysuria.   Musculoskeletal: Positive for arthralgias and joint swelling. Negative for back pain, myalgias and neck pain.   Skin: Negative for pallor and rash.   Neurological: Negative for dizziness, light-headedness and headaches.   Psychiatric/Behavioral: Positive for agitation and sleep disturbance. The patient is nervous/anxious.      Objective:     Vital Signs (Most Recent):  Temp: 98.6 °F (37 °C) (08/22/20 1107)  Pulse: 90 (08/22/20 1107)  Resp: 17 (08/22/20 1258)  BP: 113/61 (08/22/20 1107)  SpO2: 95 % (08/22/20 1107) Vital Signs (24h Range):  Temp:  [97.9 °F (36.6 °C)-99.5 °F (37.5 °C)] 98.6 °F (37 °C)  Pulse:  [71-90] 90  Resp:  [1-24] 17  SpO2:  [95 %-100 %] 95 %  BP: (112-126)/(60-79) 113/61     Weight: 63.5 kg (140  lb)  Body mass index is 21.29 kg/m².    Intake/Output Summary (Last 24 hours) at 8/22/2020 1438  Last data filed at 8/21/2020 1836  Gross per 24 hour   Intake 160 ml   Output --   Net 160 ml      Physical Exam  Vitals signs and nursing note reviewed.   Constitutional:       Appearance: Normal appearance. He is ill-appearing.   HENT:      Head: Normocephalic and atraumatic.      Mouth/Throat:      Mouth: Mucous membranes are moist.      Pharynx: Oropharynx is clear.   Eyes:      General: No scleral icterus.     Extraocular Movements: Extraocular movements intact.      Conjunctiva/sclera: Conjunctivae normal.   Neck:      Musculoskeletal: Normal range of motion and neck supple.   Cardiovascular:      Rate and Rhythm: Normal rate.      Pulses: Normal pulses.      Heart sounds: No murmur. No friction rub. No gallop.    Pulmonary:      Effort: Pulmonary effort is normal. No respiratory distress.      Breath sounds: No wheezing, rhonchi or rales.   Abdominal:      General: Abdomen is flat. Bowel sounds are normal. There is no distension.      Palpations: Abdomen is soft.      Tenderness: There is no abdominal tenderness.   Musculoskeletal:         General: Swelling and tenderness present.      Comments: Dressings in place to anterior left shoulder.   Skin:     General: Skin is warm and dry.      Findings: No erythema or rash.   Neurological:      Mental Status: He is alert and oriented to person, place, and time. Mental status is at baseline.      Motor: No weakness.      Coordination: Coordination normal.      Gait: Gait normal.   Psychiatric:         Mood and Affect: Mood normal.         Behavior: Behavior normal.      Comments: Patient mood and behavior normal on my assessment this morning.         Significant Labs:   BMP:   Recent Labs   Lab 08/22/20  0442   GLU 96      K 3.2*      CO2 25   BUN 11   CREATININE 0.7   CALCIUM 8.9   MG 1.8     CBC:   Recent Labs   Lab 08/21/20  0438 08/22/20  0442   WBC  13.77* 7.45   HGB 11.4* 11.4*   HCT 34.2* 34.6*    278       Significant Imaging: I have reviewed all pertinent imaging results/findings within the past 24 hours.

## 2020-08-22 NOTE — ASSESSMENT & PLAN NOTE
Bebo Koroma is a 24 y.o. male IVDU presenting with fevers, left shoulder pain, right knee pain, right ankle pain s/p L shoulder arthroscopy, R knee arthroscopy and R ankle arthrotomy on 8/19/20. R knee and R ankle pain well controlled. L shoulder pain significant with ROM. Continue iv abx per ID recs. No plans for operative intervention at this time.     -Trend ESR and CRP daily  -Follow cultures, ngtd  -PT/OT WBAT in all joints  -Abx, continue vanc/rocephin;  ID recs once cx and sensitivities  -DVT asa 81 daily per medicine team  -No plans for additional operative intervention at this time.

## 2020-08-22 NOTE — PLAN OF CARE
Problem: Fall Injury Risk  Goal: Absence of Fall and Fall-Related Injury  Outcome: Ongoing, Progressing     Problem: Adult Inpatient Plan of Care  Goal: Plan of Care Review  Outcome: Ongoing, Progressing  Goal: Patient-Specific Goal (Individualization)  Outcome: Ongoing, Progressing  Goal: Absence of Hospital-Acquired Illness or Injury  Outcome: Ongoing, Progressing  Goal: Optimal Comfort and Wellbeing  Outcome: Ongoing, Progressing  Goal: Readiness for Transition of Care  Outcome: Ongoing, Progressing  Goal: Rounds/Family Conference  Outcome: Ongoing, Progressing     Problem: Skin Injury Risk Increased  Goal: Skin Health and Integrity  Outcome: Ongoing, Progressing   Patient alert and oriented X4.  Assessment and ordered interventions completed see flow sheet.  Pain controled with scheduled and PRN interventions.  Vitals remain stable. No falls or injuries during shift.

## 2020-08-22 NOTE — PROGRESS NOTES
[facility] Hospital Medicine  Progress Note    Patient Name: Bebo Koroma  MRN: [id number]  Patient Class: IP- Inpatient   Admission Date: 8/19/2020  Length of Stay: 2 days  Attending Physician: Ronna Greene MD  Primary Care Provider: Primary Doctor [facility] Medicine Team: [facility] 2 Ender Fan MD    Subjective:     Principal Problem:SIRS (systemic inflammatory response syndrome)        HPI:  Mr. Koroma is a 24 year old M with a PMHx of IV drug abuse and depression that presented to the ED complaining of left shoulder pain, right knee pain, and right ankle pain. Pain started when the patient woke up about 3 days ago, mostly left shoulder pain at that time. He then developed right ankle pain and right knee pain. He describes the pain as constant, sharp and worse with movement. Shoulder pain radiates down to the antecubital fossa region. He tried taking Tylenol with no relief. He has experienced significant decrease in ROM of left shoulder and states that he could not walk for 2 days due to his pain. Reports subjective fevers, chills, fatigue, general weakness, night sweats. Denies N/V, chest pain, SOB, abdominal pain, diarrhea, pain with urination. Patient reports that he last used IV opioids about 3 days ago. Injects into the left arm. On Subox-one at home but has not taken in 3 days. Denies alcohol use and other recreational drugs.     Pt arrived to ED with temp of 101.4, tachycardic elevated CRP and ESR. WBC and lactate wnl. Blood cultures done and 1 dose of vanc and zosyn given along with IVF. Xray L shoulder with no significant findings.     Overview/Hospital Course:  Pt admitted to  2 for further management of sepsis (source unidentified). Ortho consulted to r/o septic arthritis. Xray of right knee and ankle ordered. Psych consulted for management of addiction and medications (on Abilify, Prozac, and Subox-one at home). R knee tapped (31K WBC, 90% segs) R ankle  tapped (600 WBC, 43% seg), L shoulder tapped (clotted). Cultures pending. Taken to the OR on 8/19 for arthroscopy of knee and shoulder and arthrotomy of ankle. 8/20 pt in pain, crying, sweating with chills. Subox-one unable to be restarted due to patient being on opioids. Adjusted pain medications for better pain control. Started opioid withdrawal protocol per psych recs. Restarting Abilify and Prozac.    Interval History: Patient seen and examined this AM. Patient agitated yesterday evening regarding current pain regimen. He expressed frustration with nursing staff this AM and even threatened to leave AMA despite extensive conversation regarding his current plan of care. Acute Pain Management service consult placed today for assistance. He is now post op day two of his orthopedic procedures. He continues to have complaints of left shoulder pain at this time although mildly improved this AM from day prior. Orthopedic Surgery with no plans for further surgical intervention at this time. His cultures remain without growth at this time. He is resume on vancomycin and Rocephin per ID.    Review of Systems   Constitutional: Positive for appetite change. Negative for chills, diaphoresis and fever.   HENT: Negative for congestion, sore throat and trouble swallowing.    Eyes: Negative for visual disturbance.   Respiratory: Negative for cough, shortness of breath and wheezing.    Cardiovascular: Negative for chest pain and palpitations.   Gastrointestinal: Negative for abdominal distention, abdominal pain, constipation, diarrhea, nausea and vomiting.   Genitourinary: Negative for difficulty urinating and dysuria.   Musculoskeletal: Positive for arthralgias and joint swelling. Negative for back pain, myalgias and neck pain.   Skin: Negative for pallor and rash.   Neurological: Negative for dizziness, light-headedness and headaches.   Psychiatric/Behavioral: Positive for agitation and sleep disturbance. The patient is  nervous/anxious.      Objective:     Vital Signs (Most Recent):  Temp: 98.6 °F (37 °C) (08/22/20 1107)  Pulse: 90 (08/22/20 1107)  Resp: 17 (08/22/20 1258)  BP: 113/61 (08/22/20 1107)  SpO2: 95 % (08/22/20 1107) Vital Signs (24h Range):  Temp:  [97.9 °F (36.6 °C)-99.5 °F (37.5 °C)] 98.6 °F (37 °C)  Pulse:  [71-90] 90  Resp:  [1-24] 17  SpO2:  [95 %-100 %] 95 %  BP: (112-126)/(60-79) 113/61     Weight: 63.5 kg (140 lb)  Body mass index is 21.29 kg/m².    Intake/Output Summary (Last 24 hours) at 8/22/2020 1438  Last data filed at 8/21/2020 1836  Gross per 24 hour   Intake 160 ml   Output --   Net 160 ml      Physical Exam  Vitals signs and nursing note reviewed.   Constitutional:       Appearance: Normal appearance. He is ill-appearing.   HENT:      Head: Normocephalic and atraumatic.      Mouth/Throat:      Mouth: Mucous membranes are moist.      Pharynx: Oropharynx is clear.   Eyes:      General: No scleral icterus.     Extraocular Movements: Extraocular movements intact.      Conjunctiva/sclera: Conjunctivae normal.   Neck:      Musculoskeletal: Normal range of motion and neck supple.   Cardiovascular:      Rate and Rhythm: Normal rate.      Pulses: Normal pulses.      Heart sounds: No murmur. No friction rub. No gallop.    Pulmonary:      Effort: Pulmonary effort is normal. No respiratory distress.      Breath sounds: No wheezing, rhonchi or rales.   Abdominal:      General: Abdomen is flat. Bowel sounds are normal. There is no distension.      Palpations: Abdomen is soft.      Tenderness: There is no abdominal tenderness.   Musculoskeletal:         General: Swelling and tenderness present.      Comments: Dressings in place to anterior left shoulder.   Skin:     General: Skin is warm and dry.      Findings: No erythema or rash.   Neurological:      Mental Status: He is alert and oriented to person, place, and time. Mental status is at baseline.      Motor: No weakness.      Coordination: Coordination normal.       "Gait: Gait normal.   Psychiatric:         Mood and Affect: Mood normal.         Behavior: Behavior normal.      Comments: Patient mood and behavior normal on my assessment this morning.         Significant Labs:   BMP:   Recent Labs   Lab 08/22/20  0442   GLU 96      K 3.2*      CO2 25   BUN 11   CREATININE 0.7   CALCIUM 8.9   MG 1.8     CBC:   Recent Labs   Lab 08/21/20  0438 08/22/20  0442   WBC 13.77* 7.45   HGB 11.4* 11.4*   HCT 34.2* 34.6*    278       Significant Imaging: I have reviewed all pertinent imaging results/findings within the past 24 hours.      Assessment/Plan:      * SIRS (systemic inflammatory response syndrome)  24 year old M with a PMHx of IV drug abuse and depression presented to the ED complaining of left shoulder pain, right knee pain, and right ankle pain. On arrival temp 101.4, pulse 111, elevated CRP and Sed rate. WBC wnl. He has significant decreased ROM of left shoulder and swelling of right knee. Infectious workup started in the ED. Vanc and zosyn given in the ED.    Xray left shoulder, R knee, R ankle, CXR, and US right lower leg: no significant findings    POD 3: arthroscopy R knee and L shoulder, arthrotomy R ankle -  per ortho op-note "I think this is either a very early case of septic arthritis of multiple joints versus inflammatory poly arthritis. Given his febrile state and IV drug abuse I think he should be treated as though the septic arthritis even if his cultures fail to grow anything."    Plan:   -- Ortho consulted for septic arthritis r/o. Follwing recs  -- Aspiration of joints: R knee (31K WBC, 90% segs) R ankle (600 WBC, 43% segs), L shoulder clotted  -- Fluid aspiration gram stain pending  -- Continue Rocephin and Vanc  -- ID consulted continue to appreciate recs  -- Blood cultures NGTD  -- HIV negative  -- Hepatitis C antibody positive so will need follow up with Infectious Disease in [facility] as outpatient  -- Procalcitonin " 0.67        Polyarthralgia  See SIRS (systemic inflammatory response syndrome)    Opioid withdrawal  See Opioid use disorder, severe, dependence      Opioid use disorder, severe, dependence  Pt reports IV heroin use since the age of 17. He has consistantly used for the past 5 years. He states he was recently 6 months sober but relapsed 1 week ago. He would like to resume his Subox-one while inpatient, however he must be off opioids for > 20 hours per psych.      Plan:   -- Addiction Psychiatry consulted and following  -- Pain Management Service also consulted for assisstance  -- Clonidine 0.1mg PO q4hr PRN for withdrawal associated HTN  -- Bentyl 10mg PO q6hr PRN for abdominal muscle cramps  -- Loperamide 2mg PO q6hr PRN for diarrhea  -- Robaxin 500mg PO q6hr PRN for muscle spasm  -- Zofran 4mg ODT q8hr PRN for nausea  -- Vistaril 50mg PO qHS PRN for insomnia  -- Tylenol 650 mg TID  -- Elavil 25 mg QHS  -- Abilify 5 mg QD  -- Prozac 10 mg QD  -- Gabapentin 300 mg BID  -- Ibuprofen 800 mg TID with Protonix 40 mg daily    Septic arthritis of right ankle  See SIRS (systemic inflammatory response syndrome)      Septic arthritis of shoulder, left  See SIRS (systemic inflammatory response syndrome)      Septic arthritis of knee, right  See SIRS (systemic inflammatory response syndrome)      Anxiety and depression  Pt reports hx of anxiety/depression on Abilify and Prozac at home. He states he takes Prozac 40 mg daily and Abilify 20 mg daily which is different than how is documented in the chart.     Plan:  -- Psych consulted for medication recs. Following  -- Abilify 5 mg daily  -- Prozac 10 mg daily    Oligoarthritis of multiple sites - L shoulder, R knee, R ankle  IV drug user presented to the ED complaining of left shoulder pain, right knee pain, and right ankle pain. On arrival temp 101.4, pulse 111, elevated CRP and Sed rate. WBC wnl. He has significant decreased ROM of left shoulder and swelling of right knee. He  states his pain has been severe, leading him unable to walk for 2 days.    Xray left shoulder, R knee, R ankle, CXR, and US right lower leg: no significant findings    Plan:   -- Ortho consulted. Following recs. See SIRS  -- Colchicine and Indomethacin d/eduardo  -- Oxycodone 10 mg Q4H PRN  -- Dilaudid 1 mg Q3H PRN  -- Scheduled Tylenol 650 mg TID      IVDU (intravenous drug user)  Pt reports use of IV drugs since the age of 17. States he has consistently used them for 5 years and became sober 6 months ago. He is on Subox-one at home which he last took 3 days ago. He relapsed last week. Last IV opioid use was 3 days ago. Patient has a 1 week old  at home that could be contributing to the relapse. On exam he was tremulous with mildly dilated pupils. He reports recent sweating and cold chills. He denies N/V diarrhea. With his chronic use, he will likely be hyper-analgesic.     Plan:  -- Clinical Opiate Withdrawal Scale: 4  -- Ativan 0.5 mg PRN  -- Psych consulted for addiction management. Following recs. See opioid dependence.  -- Continue to monitor for signs of withdrawl      Elevated C-reactive protein (CRP)  See SIRS      Elevated erythrocyte sedimentation rate  See SIRS        VTE Risk Mitigation (From admission, onward)         Ordered     Place sequential compression device  Until discontinued      20 1643     IP VTE LOW RISK PATIENT  Once      20 1643                Discharge Planning   TITA: 2020     Code Status: Full Code   Is the patient medically ready for discharge?:     Reason for patient still in hospital (select all that apply): Patient trending condition, Laboratory test, Treatment and Consult recommendations  Discharge Plan A: Home        Ender Fan MD  Department of Hospital Medicine   [facility]

## 2020-08-22 NOTE — ASSESSMENT & PLAN NOTE
IV drug user presented to the ED complaining of left shoulder pain, right knee pain, and right ankle pain. On arrival temp 101.4, pulse 111, elevated CRP and Sed rate. WBC wnl. He has significant decreased ROM of left shoulder and swelling of right knee. He states his pain has been severe, leading him unable to walk for 2 days.    Xray left shoulder, R knee, R ankle, CXR, and US right lower leg: no significant findings    Plan:   -- Ortho consulted. Following recs. See SIRS  -- Colchicine and Indomethacin d/eduardo  -- Oxycodone 10 mg Q4H PRN  -- Dilaudid 1 mg Q3H PRN  -- Scheduled Tylenol 650 mg TID

## 2020-08-22 NOTE — ASSESSMENT & PLAN NOTE
Pt reports IV heroin use since the age of 17. He has consistantly used for the past 5 years. He states he was recently 6 months sober but relapsed 1 week ago. He would like to resume his Subox-one while inpatient, however he must be off opioids for > 20 hours per psych.      Plan:   -- Addiction Psychiatry consulted and following  -- Pain Management Service also consulted for assisstance  -- Clonidine 0.1mg PO q4hr PRN for withdrawal associated HTN  -- Bentyl 10mg PO q6hr PRN for abdominal muscle cramps  -- Loperamide 2mg PO q6hr PRN for diarrhea  -- Robaxin 500mg PO q6hr PRN for muscle spasm  -- Zofran 4mg ODT q8hr PRN for nausea  -- Vistaril 50mg PO qHS PRN for insomnia  -- Tylenol 650 mg TID  -- Elavil 25 mg QHS  -- Abilify 5 mg QD  -- Prozac 10 mg QD  -- Gabapentin 300 mg BID  -- Ibuprofen 800 mg TID with Protonix 40 mg daily

## 2020-08-22 NOTE — NURSING
Pt threatening to leave AMA because pain is not being controlled for his comfort level despite prn and ordered medication given when asked by pt, MD notified by Mara ALVARADO. Team informed nurse that they will consult with team and talk to pt. Charge nurse aware.

## 2020-08-23 LAB
ALBUMIN SERPL BCP-MCNC: 2.8 G/DL (ref 3.5–5.2)
ALP SERPL-CCNC: 68 U/L (ref 55–135)
ALT SERPL W/O P-5'-P-CCNC: 10 U/L (ref 10–44)
ANION GAP SERPL CALC-SCNC: 10 MMOL/L (ref 8–16)
AST SERPL-CCNC: 10 U/L (ref 10–40)
BASOPHILS # BLD AUTO: 0.04 K/UL (ref 0–0.2)
BASOPHILS NFR BLD: 0.5 % (ref 0–1.9)
BILIRUB SERPL-MCNC: 0.3 MG/DL (ref 0.1–1)
BUN SERPL-MCNC: 11 MG/DL (ref 6–20)
CALCIUM SERPL-MCNC: 9 MG/DL (ref 8.7–10.5)
CHLORIDE SERPL-SCNC: 105 MMOL/L (ref 95–110)
CO2 SERPL-SCNC: 23 MMOL/L (ref 23–29)
CREAT SERPL-MCNC: 0.6 MG/DL (ref 0.5–1.4)
CRP SERPL-MCNC: 111.6 MG/L (ref 0–8.2)
DIFFERENTIAL METHOD: ABNORMAL
EOSINOPHIL # BLD AUTO: 0.2 K/UL (ref 0–0.5)
EOSINOPHIL NFR BLD: 2.5 % (ref 0–8)
ERYTHROCYTE [DISTWIDTH] IN BLOOD BY AUTOMATED COUNT: 13.1 % (ref 11.5–14.5)
ERYTHROCYTE [SEDIMENTATION RATE] IN BLOOD BY WESTERGREN METHOD: >120 MM/HR (ref 0–23)
EST. GFR  (AFRICAN AMERICAN): >60 ML/MIN/1.73 M^2
EST. GFR  (NON AFRICAN AMERICAN): >60 ML/MIN/1.73 M^2
GLUCOSE SERPL-MCNC: 110 MG/DL (ref 70–110)
HCT VFR BLD AUTO: 35.7 % (ref 40–54)
HGB BLD-MCNC: 11.4 G/DL (ref 14–18)
IMM GRANULOCYTES # BLD AUTO: 0.09 K/UL (ref 0–0.04)
IMM GRANULOCYTES NFR BLD AUTO: 1.1 % (ref 0–0.5)
LYMPHOCYTES # BLD AUTO: 1.6 K/UL (ref 1–4.8)
LYMPHOCYTES NFR BLD: 19 % (ref 18–48)
MAGNESIUM SERPL-MCNC: 1.9 MG/DL (ref 1.6–2.6)
MCH RBC QN AUTO: 30.9 PG (ref 27–31)
MCHC RBC AUTO-ENTMCNC: 31.9 G/DL (ref 32–36)
MCV RBC AUTO: 97 FL (ref 82–98)
MONOCYTES # BLD AUTO: 1 K/UL (ref 0.3–1)
MONOCYTES NFR BLD: 12.4 % (ref 4–15)
NEUTROPHILS # BLD AUTO: 5.3 K/UL (ref 1.8–7.7)
NEUTROPHILS NFR BLD: 64.5 % (ref 38–73)
NRBC BLD-RTO: 0 /100 WBC
PHOSPHATE SERPL-MCNC: 4.7 MG/DL (ref 2.7–4.5)
PLATELET # BLD AUTO: 328 K/UL (ref 150–350)
PMV BLD AUTO: 10.8 FL (ref 9.2–12.9)
POTASSIUM SERPL-SCNC: 3.9 MMOL/L (ref 3.5–5.1)
PROT SERPL-MCNC: 7.1 G/DL (ref 6–8.4)
RBC # BLD AUTO: 3.69 M/UL (ref 4.6–6.2)
SODIUM SERPL-SCNC: 138 MMOL/L (ref 136–145)
VANCOMYCIN TROUGH SERPL-MCNC: 9.4 UG/ML (ref 10–22)
WBC # BLD AUTO: 8.25 K/UL (ref 3.9–12.7)

## 2020-08-23 PROCEDURE — 80202 ASSAY OF VANCOMYCIN: CPT

## 2020-08-23 PROCEDURE — 99233 PR SUBSEQUENT HOSPITAL CARE,LEVL III: ICD-10-PCS | Mod: ,,, | Performed by: PHYSICIAN ASSISTANT

## 2020-08-23 PROCEDURE — 11000001 HC ACUTE MED/SURG PRIVATE ROOM

## 2020-08-23 PROCEDURE — 63600175 PHARM REV CODE 636 W HCPCS: Performed by: STUDENT IN AN ORGANIZED HEALTH CARE EDUCATION/TRAINING PROGRAM

## 2020-08-23 PROCEDURE — 94761 N-INVAS EAR/PLS OXIMETRY MLT: CPT

## 2020-08-23 PROCEDURE — 25000003 PHARM REV CODE 250: Performed by: ORTHOPAEDIC SURGERY

## 2020-08-23 PROCEDURE — 25000003 PHARM REV CODE 250: Performed by: STUDENT IN AN ORGANIZED HEALTH CARE EDUCATION/TRAINING PROGRAM

## 2020-08-23 PROCEDURE — S4991 NICOTINE PATCH NONLEGEND: HCPCS | Performed by: STUDENT IN AN ORGANIZED HEALTH CARE EDUCATION/TRAINING PROGRAM

## 2020-08-23 PROCEDURE — 84100 ASSAY OF PHOSPHORUS: CPT

## 2020-08-23 PROCEDURE — 36415 COLL VENOUS BLD VENIPUNCTURE: CPT

## 2020-08-23 PROCEDURE — 83735 ASSAY OF MAGNESIUM: CPT

## 2020-08-23 PROCEDURE — 85025 COMPLETE CBC W/AUTO DIFF WBC: CPT

## 2020-08-23 PROCEDURE — 86140 C-REACTIVE PROTEIN: CPT

## 2020-08-23 PROCEDURE — 85652 RBC SED RATE AUTOMATED: CPT

## 2020-08-23 PROCEDURE — 25000003 PHARM REV CODE 250: Performed by: INTERNAL MEDICINE

## 2020-08-23 PROCEDURE — 99233 SBSQ HOSP IP/OBS HIGH 50: CPT | Mod: ,,, | Performed by: PHYSICIAN ASSISTANT

## 2020-08-23 PROCEDURE — 80053 COMPREHEN METABOLIC PANEL: CPT

## 2020-08-23 PROCEDURE — 99232 PR SUBSEQUENT HOSPITAL CARE,LEVL II: ICD-10-PCS | Mod: ,,, | Performed by: INTERNAL MEDICINE

## 2020-08-23 PROCEDURE — 63600175 PHARM REV CODE 636 W HCPCS: Performed by: INTERNAL MEDICINE

## 2020-08-23 PROCEDURE — 99232 SBSQ HOSP IP/OBS MODERATE 35: CPT | Mod: ,,, | Performed by: INTERNAL MEDICINE

## 2020-08-23 RX ORDER — FLUOXETINE HYDROCHLORIDE 20 MG/1
20 CAPSULE ORAL DAILY
Status: DISCONTINUED | OUTPATIENT
Start: 2020-08-24 | End: 2020-09-01 | Stop reason: HOSPADM

## 2020-08-23 RX ORDER — HYDROMORPHONE HYDROCHLORIDE 1 MG/ML
2 INJECTION, SOLUTION INTRAMUSCULAR; INTRAVENOUS; SUBCUTANEOUS EVERY 4 HOURS PRN
Status: DISCONTINUED | OUTPATIENT
Start: 2020-08-23 | End: 2020-08-28

## 2020-08-23 RX ORDER — FLUOXETINE 10 MG/1
10 CAPSULE ORAL ONCE
Status: COMPLETED | OUTPATIENT
Start: 2020-08-23 | End: 2020-08-23

## 2020-08-23 RX ADMIN — IBUPROFEN 800 MG: 400 TABLET, FILM COATED ORAL at 08:08

## 2020-08-23 RX ADMIN — OXYCODONE HYDROCHLORIDE 10 MG: 10 TABLET ORAL at 05:08

## 2020-08-23 RX ADMIN — CEFTRIAXONE 2 G: 2 INJECTION, SOLUTION INTRAVENOUS at 09:08

## 2020-08-23 RX ADMIN — AMITRIPTYLINE HYDROCHLORIDE 25 MG: 25 TABLET, FILM COATED ORAL at 08:08

## 2020-08-23 RX ADMIN — HYDROMORPHONE HYDROCHLORIDE 2 MG: 1 INJECTION, SOLUTION INTRAMUSCULAR; INTRAVENOUS; SUBCUTANEOUS at 06:08

## 2020-08-23 RX ADMIN — ASPIRIN 81 MG: 81 TABLET, COATED ORAL at 09:08

## 2020-08-23 RX ADMIN — FLUOXETINE 10 MG: 10 CAPSULE ORAL at 01:08

## 2020-08-23 RX ADMIN — METHOCARBAMOL TABLETS 750 MG: 750 TABLET, COATED ORAL at 06:08

## 2020-08-23 RX ADMIN — NICOTINE 1 PATCH: 21 PATCH, EXTENDED RELEASE TRANSDERMAL at 09:08

## 2020-08-23 RX ADMIN — METHOCARBAMOL TABLETS 750 MG: 750 TABLET, COATED ORAL at 05:08

## 2020-08-23 RX ADMIN — OXYCODONE HYDROCHLORIDE 10 MG: 10 TABLET ORAL at 12:08

## 2020-08-23 RX ADMIN — MELATONIN TAB 3 MG 6 MG: 3 TAB at 08:08

## 2020-08-23 RX ADMIN — ACETAMINOPHEN 650 MG: 325 TABLET ORAL at 09:08

## 2020-08-23 RX ADMIN — METHOCARBAMOL TABLETS 750 MG: 750 TABLET, COATED ORAL at 12:08

## 2020-08-23 RX ADMIN — DEXTROSE MONOHYDRATE 1250 MG: 50 INJECTION, SOLUTION INTRAVENOUS at 04:08

## 2020-08-23 RX ADMIN — LORAZEPAM 0.5 MG: 0.5 TABLET ORAL at 08:08

## 2020-08-23 RX ADMIN — OXYCODONE HYDROCHLORIDE 10 MG: 10 TABLET ORAL at 04:08

## 2020-08-23 RX ADMIN — PANTOPRAZOLE SODIUM 40 MG: 40 TABLET, DELAYED RELEASE ORAL at 09:08

## 2020-08-23 RX ADMIN — HYDROMORPHONE HYDROCHLORIDE 1 MG: 1 INJECTION, SOLUTION INTRAMUSCULAR; INTRAVENOUS; SUBCUTANEOUS at 04:08

## 2020-08-23 RX ADMIN — ACETAMINOPHEN 650 MG: 325 TABLET ORAL at 02:08

## 2020-08-23 RX ADMIN — OXYCODONE HYDROCHLORIDE 10 MG: 10 TABLET ORAL at 08:08

## 2020-08-23 RX ADMIN — FLUOXETINE 10 MG: 10 CAPSULE ORAL at 09:08

## 2020-08-23 RX ADMIN — HYDROMORPHONE HYDROCHLORIDE 2 MG: 1 INJECTION, SOLUTION INTRAMUSCULAR; INTRAVENOUS; SUBCUTANEOUS at 11:08

## 2020-08-23 RX ADMIN — DEXTROSE MONOHYDRATE 1250 MG: 50 INJECTION, SOLUTION INTRAVENOUS at 01:08

## 2020-08-23 RX ADMIN — HYDROMORPHONE HYDROCHLORIDE 1 MG: 1 INJECTION, SOLUTION INTRAMUSCULAR; INTRAVENOUS; SUBCUTANEOUS at 06:08

## 2020-08-23 RX ADMIN — IBUPROFEN 800 MG: 400 TABLET, FILM COATED ORAL at 09:08

## 2020-08-23 RX ADMIN — ARIPIPRAZOLE 5 MG: 5 TABLET ORAL at 09:08

## 2020-08-23 RX ADMIN — HYDROMORPHONE HYDROCHLORIDE 1 MG: 1 INJECTION, SOLUTION INTRAMUSCULAR; INTRAVENOUS; SUBCUTANEOUS at 01:08

## 2020-08-23 RX ADMIN — HYDROXYZINE PAMOATE 50 MG: 25 CAPSULE ORAL at 08:08

## 2020-08-23 RX ADMIN — HYDROMORPHONE HYDROCHLORIDE 2 MG: 1 INJECTION, SOLUTION INTRAMUSCULAR; INTRAVENOUS; SUBCUTANEOUS at 10:08

## 2020-08-23 RX ADMIN — HYDROMORPHONE HYDROCHLORIDE 2 MG: 1 INJECTION, SOLUTION INTRAMUSCULAR; INTRAVENOUS; SUBCUTANEOUS at 02:08

## 2020-08-23 RX ADMIN — GABAPENTIN 300 MG: 300 CAPSULE ORAL at 09:08

## 2020-08-23 RX ADMIN — IBUPROFEN 800 MG: 400 TABLET, FILM COATED ORAL at 02:08

## 2020-08-23 RX ADMIN — OXYCODONE HYDROCHLORIDE 10 MG: 10 TABLET ORAL at 09:08

## 2020-08-23 RX ADMIN — METHOCARBAMOL TABLETS 750 MG: 750 TABLET, COATED ORAL at 11:08

## 2020-08-23 RX ADMIN — GABAPENTIN 300 MG: 300 CAPSULE ORAL at 08:08

## 2020-08-23 RX ADMIN — VANCOMYCIN HYDROCHLORIDE 1500 MG: 1.5 INJECTION, POWDER, LYOPHILIZED, FOR SOLUTION INTRAVENOUS at 11:08

## 2020-08-23 NOTE — ASSESSMENT & PLAN NOTE
Bebo Koroma is a 24 y.o. male IVDU presenting with fevers, left shoulder pain, right knee pain, right ankle pain s/p L shoulder arthroscopy, R knee arthroscopy and R ankle arthrotomy on 8/19/20. R knee and R ankle pain well controlled. L shoulder pain significant with ROM, improved slightly from yesterday. Continue iv abx per ID recs. No plans for operative intervention at this time.     -Trend ESR and CRP daily, trending down yesterday  -Follow cultures, ngtd  -PT/OT WBAT in all joints  -Abx, continue vanc/rocephin;  ID recs once cx and sensitivities  -DVT asa 81 daily per medicine team  -No plans for additional operative intervention at this time.

## 2020-08-23 NOTE — ASSESSMENT & PLAN NOTE
Pt reports IV heroin use since the age of 17. He has consistantly used for the past 5 years. He states he was recently 6 months sober but relapsed 1 week ago. He would like to resume his Subox-one while inpatient, however he must be off opioids for > 20 hours per psych.      Plan:   -- Addiction Psychiatry consulted and following  -- Pain Management Service also consulted for assisstance  -- Clonidine 0.1mg PO q4hr PRN for withdrawal associated HTN  -- Bentyl 10mg PO q6hr PRN for abdominal muscle cramps  -- Loperamide 2mg PO q6hr PRN for diarrhea  -- Robaxin 500mg PO q6hr PRN for muscle spasm  -- Zofran 4mg ODT q8hr PRN for nausea  -- Vistaril 50mg PO qHS PRN for insomnia  -- Tylenol 650 mg TID  -- Elavil 25 mg QHS  -- Abilify 5 mg QD  -- Prozac 20 mg QD  -- Gabapentin 300 mg BID  -- Ibuprofen 800 mg TID with Protonix 40 mg daily

## 2020-08-23 NOTE — PROGRESS NOTES
[facility] Hospital Medicine  Progress Note    Patient Name: Bebo Koroma  MRN: [id number]  Patient Class: IP- Inpatient   Admission Date: 8/19/2020  Length of Stay: 3 days  Attending Physician: Ronna Greene MD  Primary Care Provider: Primary Doctor [facility] Medicine Team: [facility] HOSP MED 2 Daly Hale MD    Subjective:     Principal Problem:SIRS (systemic inflammatory response syndrome)        HPI:  Mr. Koroma is a 24 year old M with a PMHx of IV drug abuse and depression that presented to the ED complaining of left shoulder pain, right knee pain, and right ankle pain. Pain started when the patient woke up about 3 days ago, mostly left shoulder pain at that time. He then developed right ankle pain and right knee pain. He describes the pain as constant, sharp and worse with movement. Shoulder pain radiates down to the antecubital fossa region. He tried taking Tylenol with no relief. He has experienced significant decrease in ROM of left shoulder and states that he could not walk for 2 days due to his pain. Reports subjective fevers, chills, fatigue, general weakness, night sweats. Denies N/V, chest pain, SOB, abdominal pain, diarrhea, pain with urination. Patient reports that he last used IV opioids about 3 days ago. Injects into the left arm. On Subox-one at home but has not taken in 3 days. Denies alcohol use and other recreational drugs.     Pt arrived to ED with temp of 101.4, tachycardic elevated CRP and ESR. WBC and lactate wnl. Blood cultures done and 1 dose of vanc and zosyn given along with IVF. Xray L shoulder with no significant findings.     Overview/Hospital Course:  Pt admitted to IM 2 for further management of sepsis (source unknown at the time). Ortho consulted to r/o septic arthritis. Xray of right knee and ankle ordered. Psych consulted for management of addiction and medications (on Abilify, Prozac, and Subox-one at home). R knee tapped (31K WBC, 90% segs) R ankle  tapped (600 WBC, 43% seg), L shoulder tapped (clotted). Cultures pending, NGTD. Taken to the OR on 8/19 for arthroscopy of knee and shoulder and arthrotomy of ankle. 8/20 pt in pain, crying, sweating with chills. Subox-one unable to be restarted due to patient being on opioids. Adjusted pain medications for better pain control. Started opioid withdrawal protocol per psych recs. Restarting Abilify and Prozac per psych recs. Patient has had outburst with staff, stating his pain is not being controlled. Acute pain service consulted for further recs. Increased Prozac to 20 mg daily due to patient stating he takes 40 mg at home and his mood swings.     Interval History: NAEON. Patient upset and agitated this morning stating he has 10/10 pain (mostly left shoulder) and feels that we are not helping him appropriately. He states oxycodone does nothing for him and dilaudid only works temporarily. I tried setting realistic goals with him about pain control and explained that we have consulted acute pain management for further recommendations. Patient more calm during rounds. He seems to be having more frequent mood swings. No plans for the OR at this time.     Review of Systems   Constitutional: Negative for appetite change and fever.   HENT: Negative for congestion, sore throat and trouble swallowing.    Eyes: Negative for photophobia, pain and discharge.   Respiratory: Negative for cough and shortness of breath.    Cardiovascular: Negative for chest pain and palpitations.   Gastrointestinal: Negative for abdominal pain, diarrhea, nausea and vomiting.   Genitourinary: Negative for difficulty urinating.   Musculoskeletal: Positive for arthralgias and joint swelling. Negative for back pain, myalgias and neck pain.   Skin: Negative for pallor and rash.   Neurological: Negative for light-headedness, numbness and headaches.     Objective:     Vital Signs (Most Recent):  Temp: 98.6 °F (37 °C) (08/23/20 1226)  Pulse: 99 (08/23/20  1226)  Resp: 19 (08/23/20 1226)  BP: 111/60 (08/23/20 1226)  SpO2: 100 % (08/23/20 1226) Vital Signs (24h Range):  Temp:  [98 °F (36.7 °C)-99.8 °F (37.7 °C)] 98.6 °F (37 °C)  Pulse:  [] 99  Resp:  [16-20] 19  SpO2:  [94 %-100 %] 100 %  BP: (111-120)/(60-76) 111/60     Weight: 63.5 kg (140 lb)  Body mass index is 21.29 kg/m².    Intake/Output Summary (Last 24 hours) at 8/23/2020 1357  Last data filed at 8/23/2020 0650  Gross per 24 hour   Intake 1100 ml   Output 1450 ml   Net -350 ml      Physical Exam  Constitutional:       Appearance: Normal appearance.   Eyes:      Conjunctiva/sclera: Conjunctivae normal.      Pupils: Pupils are equal, round, and reactive to light.   Cardiovascular:      Rate and Rhythm: Normal rate and regular rhythm.      Pulses: Normal pulses.      Heart sounds: Normal heart sounds.   Pulmonary:      Effort: Pulmonary effort is normal. No respiratory distress.      Breath sounds: Normal breath sounds.   Abdominal:      General: Bowel sounds are normal. There is no distension.      Palpations: Abdomen is soft.      Tenderness: There is no abdominal tenderness.   Musculoskeletal:         General: Swelling and tenderness present.      Comments: Dressings in place to L shoulder, R knee, R ankle.    Skin:     General: Skin is warm.      Findings: No bruising or rash.   Neurological:      Mental Status: He is alert and oriented to person, place, and time.   Psychiatric:         Mood and Affect: Affect is angry.         Behavior: Behavior is agitated.         Significant Labs:   BMP:   Recent Labs   Lab 08/23/20  0439         K 3.9      CO2 23   BUN 11   CREATININE 0.6   CALCIUM 9.0   MG 1.9     CBC:   Recent Labs   Lab 08/22/20  0442 08/23/20  0439   WBC 7.45 8.25   HGB 11.4* 11.4*   HCT 34.6* 35.7*    328       Significant Imaging: I have reviewed all pertinent imaging results/findings within the past 24 hours.      Assessment/Plan:      * SIRS (systemic inflammatory  "response syndrome)  24 year old M with a PMHx of IV drug abuse and depression presented to the ED complaining of left shoulder pain, right knee pain, and right ankle pain. On arrival temp 101.4, pulse 111, elevated CRP and Sed rate. WBC wnl. He has significant decreased ROM of left shoulder and swelling of right knee. Infectious workup started in the ED. Vanc and zosyn given in the ED.    Xray left shoulder, R knee, R ankle, CXR, and US right lower leg: no significant findings    POD 4: arthroscopy R knee and L shoulder, arthrotomy R ankle -  per ortho op-note "I think this is either a very early case of septic arthritis of multiple joints versus inflammatory poly arthritis. Given his febrile state and IV drug abuse I think he should be treated as though the septic arthritis even if his cultures fail to grow anything."    Plan:   -- Ortho consulted for septic arthritis r/o. Follwing recs  -- Aspiration of joints: R knee (31K WBC, 90% segs) R ankle (600 WBC, 43% segs), L shoulder clotted  -- Fluid aspiration gram stain pending  -- Continue Rocephin and Vanc  -- ID consulted continue to appreciate recs  -- Blood cultures NGTD  -- HIV negative  -- Hepatitis C antibody positive so will need follow up with Infectious Disease in [facility] as outpatient  -- Procalcitonin 0.67  -- Trend ESR and CRP daily        Oligoarthritis of multiple sites - L shoulder, R knee, R ankle  IV drug user presented to the ED complaining of left shoulder pain, right knee pain, and right ankle pain. On arrival temp 101.4, pulse 111, elevated CRP and Sed rate. WBC wnl. He has significant decreased ROM of left shoulder and swelling of right knee. He states his pain has been severe, leading him unable to walk for 2 days.    Xray left shoulder, R knee, R ankle, CXR, and US right lower leg: no significant findings    Plan:   -- Ortho consulted. Following recs. See SIRS  -- Colchicine and Indomethacin d/eduardo  -- Oxycodone 10 mg Q4H PRN  -- " Dilaudid 2 mg Q4H PRN  -- Scheduled Tylenol 650 mg TID      IVDU (intravenous drug user)  Pt reports use of IV drugs since the age of 17. States he has consistently used them for 5 years and became sober 6 months ago. He is on Subox-one at home which he last took 3 days ago. He relapsed last week. Last IV opioid use was 3 days ago. Patient has a 1 week old  at home that could be contributing to the relapse. On exam he was tremulous with mildly dilated pupils. He reports recent sweating and cold chills. He denies N/V diarrhea. With his chronic use, he will likely be hyper-analgesic.     Plan:  -- Clinical Opiate Withdrawal Scale: 4  -- Ativan 0.5 mg PRN  -- Psych consulted for addiction management. Following recs. See opioid dependence.  -- Continue to monitor for signs of withdrawl      Opioid use disorder, severe, dependence  Pt reports IV heroin use since the age of 17. He has consistantly used for the past 5 years. He states he was recently 6 months sober but relapsed 1 week ago. He would like to resume his Subox-one while inpatient, however he must be off opioids for > 20 hours per psych.      Plan:   -- Addiction Psychiatry consulted and following  -- Pain Management Service also consulted for assisstance  -- Clonidine 0.1mg PO q4hr PRN for withdrawal associated HTN  -- Bentyl 10mg PO q6hr PRN for abdominal muscle cramps  -- Loperamide 2mg PO q6hr PRN for diarrhea  -- Robaxin 500mg PO q6hr PRN for muscle spasm  -- Zofran 4mg ODT q8hr PRN for nausea  -- Vistaril 50mg PO qHS PRN for insomnia  -- Tylenol 650 mg TID  -- Elavil 25 mg QHS  -- Abilify 5 mg QD  -- Prozac 20 mg QD  -- Gabapentin 300 mg BID  -- Ibuprofen 800 mg TID with Protonix 40 mg daily    Anxiety and depression  Pt reports hx of anxiety/depression on Abilify and Prozac at home. He states he takes Prozac 40 mg daily and Abilify 20 mg daily which is different than how is documented in the chart. Patient is having mood swings with outbursts due  to his pain not being controlled. Some of this may be due to his underlying psych history.     Plan:  -- Psych consulted for medication recs. Following  -- Abilify 5 mg daily  -- Increased Prozac to 20 mg daily to see if it helps with his mood.    Opioid withdrawal  See Opioid use disorder, severe, dependence      Septic arthritis of right ankle  See SIRS (systemic inflammatory response syndrome)      Septic arthritis of shoulder, left  See SIRS (systemic inflammatory response syndrome)      Septic arthritis of knee, right  See SIRS (systemic inflammatory response syndrome)      Elevated C-reactive protein (CRP)  See SIRS (systemic inflammatory response syndrome)      Elevated erythrocyte sedimentation rate  See SIRS (systemic inflammatory response syndrome)        VTE Risk Mitigation (From admission, onward)         Ordered     Place sequential compression device  Until discontinued      08/19/20 1643     IP VTE LOW RISK PATIENT  Once      08/19/20 1643                Discharge Planning   TITA: 8/24/2020     Code Status: Full Code   Is the patient medically ready for discharge?:     Reason for patient still in hospital (select all that apply): Treatment  Discharge Plan A: Home                  Daly Hale MD  Department of Hospital Medicine   [facility]

## 2020-08-23 NOTE — SUBJECTIVE & OBJECTIVE
Principal Problem:SIRS (systemic inflammatory response syndrome)    Principal Orthopedic Problem: s/p L shoulder arthroscopy, R knee arthroscopy and R ankle arthrotomy on 8/19/20    Interval History: The patient was seen and examined at the bedside. NAEON. Tolerating PO intake. VSS. Pain improved significantly in R knee and R ankle, able to ambulate. Still having significant L shoulder pain, improved some from yesterday.       Review of patient's allergies indicates:   Allergen Reactions    Penicillins Hives     Unclear of last episode of allergic reaction       Current Facility-Administered Medications   Medication    acetaminophen tablet 650 mg    amitriptyline tablet 25 mg    ARIPiprazole tablet 5 mg    aspirin EC tablet 81 mg    cefTRIAXone (ROCEPHIN) 2 g/50 mL D5W IVPB    cloNIDine tablet 0.1 mg    dextrose 50% injection 12.5 g    dextrose 50% injection 25 g    dicyclomine capsule 10 mg    FLUoxetine capsule 10 mg    gabapentin capsule 300 mg    glucagon (human recombinant) injection 1 mg    glucose chewable tablet 16 g    glucose chewable tablet 24 g    HYDROmorphone injection 1 mg    hydrOXYzine pamoate capsule 50 mg    ibuprofen tablet 800 mg    loperamide capsule 2 mg    LORazepam tablet 0.5 mg    melatonin tablet 6 mg    methocarbamoL tablet 750 mg    naloxone 0.4 mg/mL injection 0.4 mg    nicotine 21 mg/24 hr 1 patch    ondansetron disintegrating tablet 8 mg    oxyCODONE immediate release tablet Tab 10 mg    pantoprazole EC tablet 40 mg    prochlorperazine tablet 10 mg    senna-docusate 8.6-50 mg per tablet 1 tablet    sodium chloride 0.9% flush 10 mL    vancomycin 1.25 g in dextrose 5% 250 mL IVPB (ready to mix)     Objective:     Vital Signs (Most Recent):  Temp: 98 °F (36.7 °C) (08/23/20 0406)  Pulse: 98 (08/23/20 0406)  Resp: 18 (08/23/20 0649)  BP: 120/60 (08/23/20 0406)  SpO2: 99 % (08/23/20 0406) Vital Signs (24h Range):  Temp:  [98 °F (36.7 °C)-98.7 °F (37.1 °C)] 98  "°F (36.7 °C)  Pulse:  [87-98] 98  Resp:  [16-20] 18  SpO2:  [94 %-99 %] 99 %  BP: (113-120)/(60-76) 120/60     Weight: 63.5 kg (140 lb)  Height: 5' 8" (172.7 cm)  Body mass index is 21.29 kg/m².      Intake/Output Summary (Last 24 hours) at 8/23/2020 0802  Last data filed at 8/23/2020 0650  Gross per 24 hour   Intake 1420 ml   Output 1890 ml   Net -470 ml       Ortho/SPM Exam     LUE  Dressings c,d,i  Pain with passive shoulder ROM  SILT M/U/R  Motor intact AIN/PIN/M/U/R   Cap refill < 2s  2+ RP    RLE:  Dressings to ankle and knee c,d,i  Soreness with ankle and knee passive  SILT Sa/Bell/DP/SP/T  Motor intact EHL/FHL/TA/Gastroc  2+ DP, 2+ PT          Significant Labs:   CBC:   Recent Labs   Lab 08/22/20  0442 08/23/20  0439   WBC 7.45 8.25   HGB 11.4* 11.4*   HCT 34.6* 35.7*    328     CMP:   Recent Labs   Lab 08/22/20  0442 08/23/20  0439    138   K 3.2* 3.9    105   CO2 25 23   GLU 96 110   BUN 11 11   CREATININE 0.7 0.6   CALCIUM 8.9 9.0   PROT 7.0 7.1   ALBUMIN 2.9* 2.8*   BILITOT 0.4 0.3   ALKPHOS 72 68   AST 12 10   ALT 13 10   ANIONGAP 12 10   EGFRNONAA >60.0 >60.0     CRP:   Recent Labs   Lab 08/22/20  1014   CRP 93.0*     All pertinent labs within the past 24 hours have been reviewed.    Significant Imaging: I have reviewed all pertinent imaging results/findings.  "

## 2020-08-23 NOTE — PROGRESS NOTES
Pharmacokinetic Assessment Follow Up: IV Vancomycin    Vancomycin serum concentration assessment(s):  · Trough of 9.4 mcg/mL was drawn appropriately and will be used to guide therapy  · Subtherapeutic level for target of 15-20 mcg/mL  · Renal function stable    Vancomycin Regimen Plan:  1. Will increase to vancomycin 1500mg q8 hours  2. Obtain trough prior to dose on 8/25 @ 0500    Drug levels (last 3 results):  Recent Labs   Lab Result Units 08/21/20  0438 08/22/20 0442 08/23/20  1221   Vancomycin-Trough ug/mL 5.7* 10.9 9.4*       Pharmacy will continue to follow and monitor vancomycin.    Please contact pharmacy at extension [phone] for questions regarding this assessment.    Thank you for the consult,   Cris Grace       Patient brief summary:  Bebo Koroma is a 24 y.o. male initiated on antimicrobial therapy with IV Vancomycin for treatment of bone/joint infection    The patient's current regimen is 1250mg q8 hours    Drug Allergies:   Review of patient's allergies indicates:   Allergen Reactions    Penicillins Hives     Unclear of last episode of allergic reaction       Actual Body Weight:   63.5 kg    Renal Function:   Estimated Creatinine Clearance: 172 mL/min (based on SCr of 0.6 mg/dL).,       CBC (last 72 hours):  Recent Labs   Lab Result Units 08/21/20 0438 08/22/20 0442 08/23/20  0439   WBC K/uL 13.77* 7.45 8.25   Hemoglobin g/dL 11.4* 11.4* 11.4*   Hematocrit % 34.2* 34.6* 35.7*   Platelets K/uL 240 278 328   Gran% % 77.2* 57.1 64.5   Lymph% % 11.9* 27.1 19.0   Mono% % 9.4 13.0 12.4   Eosinophil% % 0.1 0.9 2.5   Basophil% % 0.2 0.3 0.5   Differential Method  Automated Automated Automated       Metabolic Panel (last 72 hours):  Recent Labs   Lab Result Units 08/21/20  0438 08/22/20 0442 08/23/20  0439   Sodium mmol/L 138 140 138   Potassium mmol/L 4.1 3.2* 3.9   Chloride mmol/L 102 103 105   CO2 mmol/L 29 25 23   Glucose mg/dL 106 96 110   BUN, Bld mg/dL 15 11 11   Creatinine mg/dL 0.6 0.7 0.6    Albumin g/dL 3.0* 2.9* 2.8*   Total Bilirubin mg/dL 0.4 0.4 0.3   Alkaline Phosphatase U/L 164* 72 68   AST U/L 16 12 10   ALT U/L 16 13 10   Magnesium mg/dL 2.2 1.8 1.9   Phosphorus mg/dL 3.4 4.7* 4.7*       Vancomycin Administrations:  vancomycin given in the last 96 hours                   vancomycin 1.25 g in dextrose 5% 250 mL IVPB (ready to mix) (mg) 1,250 mg New Bag 08/23/20 0407     1,250 mg New Bag 08/22/20 2107     1,250 mg New Bag  1258    vancomycin in dextrose 5 % 1 gram/250 mL IVPB 1,000 mg (mg) 1,000 mg New Bag 08/22/20 0556     1,000 mg New Bag 08/21/20 2157     1,000 mg New Bag  1420    vancomycin in dextrose 5 % 1 gram/250 mL IVPB 1,000 mg (mg) 1,000 mg New Bag 08/21/20 0534     1,000 mg New Bag 08/20/20 1557     1,000 mg New Bag  0211    vancomycin injection (g) 3 g Given 08/19/20 2325                Microbiologic Results:  Microbiology Results (last 7 days)     Procedure Component Value Units Date/Time    Blood culture [[id number]] Collected: 08/19/20 1644    Order Status: Completed Specimen: Blood from Peripheral, Antecubital, Right Updated: 08/22/20 1822     Blood Culture, Routine No Growth to date      No Growth to date      No Growth to date      No Growth to date    Blood culture x two cultures. Draw prior to antibiotics. [[id number]] Collected: 08/19/20 1140    Order Status: Completed Specimen: Blood from Peripheral, Antecubital, Right Updated: 08/22/20 1412     Blood Culture, Routine No Growth to date      No Growth to date      No Growth to date      No Growth to date    Narrative:      Aerobic and anaerobic    Blood culture x two cultures. Draw prior to antibiotics. [[id number]] Collected: 08/19/20 1141    Order Status: Completed Specimen: Blood from Peripheral, Forearm, Right Updated: 08/22/20 1412     Blood Culture, Routine No Growth to date      No Growth to date      No Growth to date      No Growth to date    Narrative:      Aerobic and anaerobic    Chlamydia/Neisseria gonorrhoeae  RNA, TMA [[id number]] Collected: 08/19/20 1140    Order Status: Completed Updated: 08/22/20 1022     Neisseria gonorrhoeae RNA, TMA Negative     Comment: Chlamydia trachomatis and Neisseria gonorrhoeae by TMA is   negative, therefore no further testing added.  INTERPRETIVE INFORMATION: CT/NG TMA with Rfx to Confirmation  This test is intended for medical purposes only. It is not   intended for the evaluation of suspected sexual abuse or   for other medicolegal indications. Refer to the most recent   CDC recommendations for patients in whom a false positive   result may have adverse psychosocial impact.  Positive results will be confirmed with alternative nucleic   acid target assay.          Chlamydia trachomatis, RNA, TMA Negative     Comment: Performed By: [facility]  [address] [place], [place] [id number]  : Francois Shell MD, MS         Aerobic culture [[id number]] Collected: 08/19/20 1802    Order Status: Completed Specimen: Joint Fluid from Knee, Right Updated: 08/22/20 0951     Aerobic Bacterial Culture No growth    Aerobic culture [[id number]] Collected: 08/19/20 1805    Order Status: Completed Specimen: Joint Fluid from Shoulder, Left Updated: 08/22/20 0951     Aerobic Bacterial Culture No growth    Aerobic culture [[id number]] Collected: 08/19/20 1804    Order Status: Completed Specimen: Joint Fluid from Ankle, Right Updated: 08/22/20 0951     Aerobic Bacterial Culture No growth    Culture, Anaerobic [[id number]] Collected: 08/19/20 1805    Order Status: Completed Specimen: Joint Fluid from Shoulder, Left Updated: 08/21/20 1118     Anaerobic Culture Culture in progress    Culture, Anaerobic [[id number]] Collected: 08/19/20 1804    Order Status: Completed Specimen: Joint Fluid from Ankle, Right Updated: 08/21/20 1117     Anaerobic Culture Culture in progress    Culture, Anaerobic [[id number]] Collected: 08/19/20 1802    Order Status: Completed Specimen: Joint Fluid from Knee, Right  Updated: 08/21/20 1116     Anaerobic Culture Culture in progress    AFB Culture & Smear [[id number]] Collected: 08/19/20 1804    Order Status: Completed Specimen: Joint Fluid from Ankle, Right Updated: 08/20/20 2127     AFB Culture & Smear Culture in progress     AFB CULTURE STAIN No acid fast bacilli seen.    AFB Culture & Smear [[id number]] Collected: 08/19/20 1805    Order Status: Completed Specimen: Joint Fluid from Shoulder, Left Updated: 08/20/20 2127     AFB Culture & Smear Culture in progress     AFB CULTURE STAIN No acid fast bacilli seen.    AFB Culture & Smear [[id number]] Collected: 08/19/20 1802    Order Status: Completed Specimen: Joint Fluid from Knee, Right Updated: 08/20/20 2127     AFB Culture & Smear Culture in progress     AFB CULTURE STAIN No acid fast bacilli seen.    Gram stain [[id number]] Collected: 08/19/20 1802    Order Status: Completed Specimen: Joint Fluid from Knee, Right Updated: 08/19/20 1932     Gram Stain Result Rare WBC's      No organisms seen    Gram stain [[id number]] Collected: 08/19/20 1804    Order Status: Completed Specimen: Joint Fluid from Ankle, Right Updated: 08/19/20 1930     Gram Stain Result Rare WBC's      No organisms seen    Gram stain [[id number]] Collected: 08/19/20 1805    Order Status: Completed Specimen: Joint Fluid from Shoulder, Left Updated: 08/19/20 1929     Gram Stain Result No WBC's      No organisms seen    Fungus culture [[id number]] Collected: 08/19/20 1802    Order Status: Sent Specimen: Joint Fluid from Knee, Right Updated: 08/19/20 1816    Fungus culture [[id number]] Collected: 08/19/20 1804    Order Status: Sent Specimen: Joint Fluid from Ankle, Right Updated: 08/19/20 1813    Fungus culture [[id number]] Collected: 08/19/20 1805    Order Status: Sent Specimen: Joint Fluid from Shoulder, Left Updated: 08/19/20 1811    C. trachomatis/N. gonorrhoeae by AMP DNA [[id number]] Collected: 08/19/20 1140    Order Status: Canceled Specimen: Genital

## 2020-08-23 NOTE — SUBJECTIVE & OBJECTIVE
Interval History:   More calm and comfortable today. Surgical cultures NGTD. Discussed with orthopedics, recommend treatment for early septic polyarthritis   C/o pain    Review of Systems   Constitutional: Positive for activity change, appetite change and fatigue. Negative for chills, diaphoresis and fever.   Respiratory: Negative for cough and shortness of breath.    Gastrointestinal: Negative for abdominal pain, nausea and vomiting.   Genitourinary: Negative for dysuria.   Musculoskeletal: Positive for arthralgias and joint swelling. Negative for back pain.   Skin: Negative for color change, pallor, rash and wound.   Neurological: Negative for dizziness and headaches.   All other systems reviewed and are negative.    Objective:     Vital Signs (Most Recent):  Temp: 99.8 °F (37.7 °C) (08/23/20 0918)  Pulse: 103 (08/23/20 0918)  Resp: 16 (08/23/20 0920)  BP: 119/69 (08/23/20 0918)  SpO2: 100 % (08/23/20 0918) Vital Signs (24h Range):  Temp:  [98 °F (36.7 °C)-99.8 °F (37.7 °C)] 99.8 °F (37.7 °C)  Pulse:  [] 103  Resp:  [16-20] 16  SpO2:  [94 %-100 %] 100 %  BP: (113-120)/(60-76) 119/69     Weight: 63.5 kg (140 lb)  Body mass index is 21.29 kg/m².    Estimated Creatinine Clearance: 172 mL/min (based on SCr of 0.6 mg/dL).    Physical Exam  Vitals signs and nursing note reviewed.   Constitutional:       General: He is not in acute distress.     Appearance: He is not ill-appearing, toxic-appearing or diaphoretic.      Comments: thin   HENT:      Head: Normocephalic.   Cardiovascular:      Rate and Rhythm: Regular rhythm. Tachycardia present.      Heart sounds: No murmur. No friction rub. No gallop.    Pulmonary:      Effort: Pulmonary effort is normal. No respiratory distress.      Breath sounds: Normal breath sounds. No stridor.   Abdominal:      General: Abdomen is flat. There is no distension.      Palpations: Abdomen is soft. There is no mass.   Musculoskeletal:         General: Swelling and tenderness present.  No deformity or signs of injury.   Skin:     General: Skin is warm and dry.      Coloration: Skin is not jaundiced or pale.   Neurological:      General: No focal deficit present.      Mental Status: He is oriented to person, place, and time.   Psychiatric:      Comments: tearful         Significant Labs: All pertinent labs within the past 24 hours have been reviewed.    Significant Imaging: I have reviewed all pertinent imaging results/findings within the past 24 hours.

## 2020-08-23 NOTE — ASSESSMENT & PLAN NOTE
"24 year old M with a PMHx of IV drug abuse and depression presented to the ED complaining of left shoulder pain, right knee pain, and right ankle pain. On arrival temp 101.4, pulse 111, elevated CRP and Sed rate. WBC wnl. He has significant decreased ROM of left shoulder and swelling of right knee. Infectious workup started in the ED. Vanc and zosyn given in the ED.    Xray left shoulder, R knee, R ankle, CXR, and US right lower leg: no significant findings    POD 4: arthroscopy R knee and L shoulder, arthrotomy R ankle -  per ortho op-note "I think this is either a very early case of septic arthritis of multiple joints versus inflammatory poly arthritis. Given his febrile state and IV drug abuse I think he should be treated as though the septic arthritis even if his cultures fail to grow anything."    Plan:   -- Ortho consulted for septic arthritis r/o. Follwing recs  -- Aspiration of joints: R knee (31K WBC, 90% segs) R ankle (600 WBC, 43% segs), L shoulder clotted  -- Fluid aspiration gram stain pending  -- Continue Rocephin and Vanc  -- ID consulted continue to appreciate recs  -- Blood cultures NGTD  -- HIV negative  -- Hepatitis C antibody positive so will need follow up with Infectious Disease in Hepatitis Clinic as outpatient  -- Procalcitonin 0.67  -- Trend ESR and CRP daily      "

## 2020-08-23 NOTE — PROGRESS NOTES
[facility]  Infectious Disease  Progress Note    Patient Name: Bebo Koroma  MRN: [id number]  Admission Date: 8/19/2020  Length of Stay: 3 days  Attending Physician: Ronna Greene MD  Primary Care Provider: Primary Doctor No    Isolation Status: No active isolations  Assessment/Plan:      Septic arthritis of knee, right  23 y/o male with IVDU presents with polyarthritis and fevers at home. He developed right ankle, right knee and left shoulder pain 3 days ago with associated fever, chills, fatigue, and night sweats. Denies n/v and chest pain. He last IV drug use was 3 days ago and inject to left arm.     tmax 101.4 in ED with elevated inflammatory markers. started on vancomycin and zosyn in ED. underwent aspiration of multiple joints yesterday (R knee 31K cells 90% segs, right ankle 600 WBC 43%segs, left shoulder no data)  He underwent washout with orthopedics 8/19 . Per op note, murky fluid was encountered from joint spaces. Cultures NGTD. . Blood cultures NGTD. Has remained afebrile, stable. Leukocytosis resolved     Recommendations  1. Continue vancomycin and ceftriaxone. Trough goal 15-20  2. Cultures 8/19 NGTD  3. Discussed with orthopedics, recommend treatment for early septic arthritis. If cultures remain NGTD, anticipate 4 weeks of IV abx. No further washout indicated at this time. need long term IV abx therapy.   4. ID sign off. Please call if with questions    Infection:     Discharge antibiotics:   vancomycin IV 1250mg q8hr and ceftriaxone 4lp12sh     End date of IV antibiotics: 9/16/2020    Weekly outpatient laboratory on Monday or Tuesday while on IV antibiotics.    CBC   CMP or BMP   ESR and CRP   Vancomycin trough. Target 15-20    If vancomycin trough is not at target (15-20) prior to discharge, the please perform vancomycin trough before their fourth outpatient dose.    Fax laboratory results to [facility] at [phone] with attn: SANCHO SOSA    Outpatient Infectious  Diseases clinic follow up will be arranged and found in patient calendar.    Prior to discharge, please ensure the patient's follow-up has been scheduled.  If there is still no follow-up scheduled in Infectious Diseases clinic, please send an EPIC message to Yashira Garcia in Infectious Diseases.                    Thank you for your consult. I will sign off. Please contact us if you have any additional questions.    Sher Alfaro PA-C  Infectious Disease  [facility]  [phone]    Subjective:     Principal Problem:SIRS (systemic inflammatory response syndrome)    HPI: 23 y/o male with IVDU presents with polyarthritis and fevers at home. He developed right ankle, right knee and left shoulder pain 3 days ago with associated fever, chills, fatigue, and night sweats. Denies n/v/ and chest pain. He last IV drug use was 3 days ago and inject to left arm.     tmax 101.4 in ED with elevated inflammatory markers. started on vancomycin and zosyn in ED. He underwent washout with orthopedics yesterday.     R Knee Joint Fluid Analysis:  WBC: 31k cells  Segs: 90%  Crystals: negative  Gram Stain: negative  Cultures pending     R Ankle Joint Fluid Analysis:  WBC: 600 cells  Segs: 43%  Crystals: negative  Gram Stain: negative  Cultures pending     L Shoulder Joint Fluid Analysis:  WBC: clotted  Segs: clotted  Crystals: negative  Gram Stain: negative  Cultures pending    Per op note, murky fluid was encountered from joint spaces. Cultures pending.   Interval History:   More calm and comfortable today. Surgical cultures NGTD. Discussed with orthopedics, recommend treatment for early septic polyarthritis   C/o pain    Review of Systems   Constitutional: Positive for activity change, appetite change and fatigue. Negative for chills, diaphoresis and fever.   Respiratory: Negative for cough and shortness of breath.    Gastrointestinal: Negative for abdominal pain, nausea and vomiting.   Genitourinary: Negative for  dysuria.   Musculoskeletal: Positive for arthralgias and joint swelling. Negative for back pain.   Skin: Negative for color change, pallor, rash and wound.   Neurological: Negative for dizziness and headaches.   All other systems reviewed and are negative.    Objective:     Vital Signs (Most Recent):  Temp: 99.8 °F (37.7 °C) (08/23/20 0918)  Pulse: 103 (08/23/20 0918)  Resp: 16 (08/23/20 0920)  BP: 119/69 (08/23/20 0918)  SpO2: 100 % (08/23/20 0918) Vital Signs (24h Range):  Temp:  [98 °F (36.7 °C)-99.8 °F (37.7 °C)] 99.8 °F (37.7 °C)  Pulse:  [] 103  Resp:  [16-20] 16  SpO2:  [94 %-100 %] 100 %  BP: (113-120)/(60-76) 119/69     Weight: 63.5 kg (140 lb)  Body mass index is 21.29 kg/m².    Estimated Creatinine Clearance: 172 mL/min (based on SCr of 0.6 mg/dL).    Physical Exam  Vitals signs and nursing note reviewed.   Constitutional:       General: He is not in acute distress.     Appearance: He is not ill-appearing, toxic-appearing or diaphoretic.      Comments: thin   HENT:      Head: Normocephalic.   Cardiovascular:      Rate and Rhythm: Regular rhythm. Tachycardia present.      Heart sounds: No murmur. No friction rub. No gallop.    Pulmonary:      Effort: Pulmonary effort is normal. No respiratory distress.      Breath sounds: Normal breath sounds. No stridor.   Abdominal:      General: Abdomen is flat. There is no distension.      Palpations: Abdomen is soft. There is no mass.   Musculoskeletal:         General: Swelling and tenderness present. No deformity or signs of injury.   Skin:     General: Skin is warm and dry.      Coloration: Skin is not jaundiced or pale.   Neurological:      General: No focal deficit present.      Mental Status: He is oriented to person, place, and time.   Psychiatric:      Comments: tearful         Significant Labs: All pertinent labs within the past 24 hours have been reviewed.    Significant Imaging: I have reviewed all pertinent imaging results/findings within the past 24  hours.

## 2020-08-23 NOTE — ASSESSMENT & PLAN NOTE
23 y/o male with IVDU presents with polyarthritis and fevers at home. He developed right ankle, right knee and left shoulder pain 3 days ago with associated fever, chills, fatigue, and night sweats. Denies n/v and chest pain. He last IV drug use was 3 days ago and inject to left arm.     tmax 101.4 in ED with elevated inflammatory markers. started on vancomycin and zosyn in ED. underwent aspiration of multiple joints yesterday (R knee 31K cells 90% segs, right ankle 600 WBC 43%segs, left shoulder no data)  He underwent washout with orthopedics 8/19 . Per op note, murky fluid was encountered from joint spaces. Cultures NGTD. . Blood cultures NGTD. Has remained afebrile, stable. Leukocytosis resolved     Recommendations  1. Continue vancomycin and ceftriaxone. Trough goal 15-20  2. Cultures 8/19 NGTD  3. Discussed with orthopedics, recommend treatment for early septic arthritis. If cultures remain NGTD, anticipate 4 weeks of IV abx. No further washout indicated at this time. need long term IV abx therapy.   4. ID sign off. Please call if with questions    Infection:     Discharge antibiotics:   vancomycin IV 1250mg q8hr and ceftriaxone 1lb10cb     End date of IV antibiotics: 9/16/2020    Weekly outpatient laboratory on Monday or Tuesday while on IV antibiotics.    CBC   CMP or BMP   ESR and CRP   Vancomycin trough. Target 15-20    If vancomycin trough is not at target (15-20) prior to discharge, the please perform vancomycin trough before their fourth outpatient dose.    Fax laboratory results to [facility] at [phone] with attn: SANCHO SOSA    Outpatient Infectious Diseases clinic follow up will be arranged and found in patient calendar.    Prior to discharge, please ensure the patient's follow-up has been scheduled.  If there is still no follow-up scheduled in Infectious Diseases clinic, please send an EPIC message to Yashira Garcia in Infectious Diseases.

## 2020-08-23 NOTE — SUBJECTIVE & OBJECTIVE
Interval History: NAEON. Patient upset and agitated this morning stating he has 10/10 pain (mostly left shoulder) and feels that we are not helping him appropriately. He states oxycodone does nothing for him and dilaudid only works temporarily. I tried setting realistic goals with him about pain control and explained that we have consulted acute pain management for further recommendations. Patient more calm during rounds. He seems to be having more frequent mood swings. No plans for the OR at this time.     Review of Systems   Constitutional: Negative for appetite change and fever.   HENT: Negative for congestion, sore throat and trouble swallowing.    Eyes: Negative for photophobia, pain and discharge.   Respiratory: Negative for cough and shortness of breath.    Cardiovascular: Negative for chest pain and palpitations.   Gastrointestinal: Negative for abdominal pain, diarrhea, nausea and vomiting.   Genitourinary: Negative for difficulty urinating.   Musculoskeletal: Positive for arthralgias and joint swelling. Negative for back pain, myalgias and neck pain.   Skin: Negative for pallor and rash.   Neurological: Negative for light-headedness, numbness and headaches.     Objective:     Vital Signs (Most Recent):  Temp: 98.6 °F (37 °C) (08/23/20 1226)  Pulse: 99 (08/23/20 1226)  Resp: 19 (08/23/20 1226)  BP: 111/60 (08/23/20 1226)  SpO2: 100 % (08/23/20 1226) Vital Signs (24h Range):  Temp:  [98 °F (36.7 °C)-99.8 °F (37.7 °C)] 98.6 °F (37 °C)  Pulse:  [] 99  Resp:  [16-20] 19  SpO2:  [94 %-100 %] 100 %  BP: (111-120)/(60-76) 111/60     Weight: 63.5 kg (140 lb)  Body mass index is 21.29 kg/m².    Intake/Output Summary (Last 24 hours) at 8/23/2020 1357  Last data filed at 8/23/2020 0650  Gross per 24 hour   Intake 1100 ml   Output 1450 ml   Net -350 ml      Physical Exam  Constitutional:       Appearance: Normal appearance.   Eyes:      Conjunctiva/sclera: Conjunctivae normal.      Pupils: Pupils are equal,  round, and reactive to light.   Cardiovascular:      Rate and Rhythm: Normal rate and regular rhythm.      Pulses: Normal pulses.      Heart sounds: Normal heart sounds.   Pulmonary:      Effort: Pulmonary effort is normal. No respiratory distress.      Breath sounds: Normal breath sounds.   Abdominal:      General: Bowel sounds are normal. There is no distension.      Palpations: Abdomen is soft.      Tenderness: There is no abdominal tenderness.   Musculoskeletal:         General: Swelling and tenderness present.      Comments: Dressings in place to L shoulder, R knee, R ankle.    Skin:     General: Skin is warm.      Findings: No bruising or rash.   Neurological:      Mental Status: He is alert and oriented to person, place, and time.   Psychiatric:         Mood and Affect: Affect is angry.         Behavior: Behavior is agitated.         Significant Labs:   BMP:   Recent Labs   Lab 08/23/20  0439         K 3.9      CO2 23   BUN 11   CREATININE 0.6   CALCIUM 9.0   MG 1.9     CBC:   Recent Labs   Lab 08/22/20  0442 08/23/20  0439   WBC 7.45 8.25   HGB 11.4* 11.4*   HCT 34.6* 35.7*    328       Significant Imaging: I have reviewed all pertinent imaging results/findings within the past 24 hours.

## 2020-08-23 NOTE — ASSESSMENT & PLAN NOTE
IV drug user presented to the ED complaining of left shoulder pain, right knee pain, and right ankle pain. On arrival temp 101.4, pulse 111, elevated CRP and Sed rate. WBC wnl. He has significant decreased ROM of left shoulder and swelling of right knee. He states his pain has been severe, leading him unable to walk for 2 days.    Xray left shoulder, R knee, R ankle, CXR, and US right lower leg: no significant findings    Plan:   -- Ortho consulted. Following recs. See SIRS  -- Colchicine and Indomethacin d/eduardo  -- Oxycodone 10 mg Q4H PRN  -- Dilaudid 2 mg Q4H PRN  -- Scheduled Tylenol 650 mg TID

## 2020-08-23 NOTE — PROGRESS NOTES
[facility]  Orthopedics  Progress Note    Patient Name: Bebo Koroma  MRN: [id number]  Admission Date: 8/19/2020  Hospital Length of Stay: 3 days  Attending Provider: Ronna Greene MD  Primary Care Provider: Primary Doctor No  Follow-up For: Procedure(s) (LRB):  ARTHROSCOPY, KNEE - cultures & 9 liters saline - arthroscopic leg lozada  (Right)  ARTHROSCOPY, SHOULDER - cultures & 9 liters saline (Left)  ARTHROTOMY, ANKLE - cultures & 9 liters of saline - cysto tubing (Right)    Post-Operative Day: 4 Days Post-Op  Subjective:     Principal Problem:SIRS (systemic inflammatory response syndrome)    Principal Orthopedic Problem: s/p L shoulder arthroscopy, R knee arthroscopy and R ankle arthrotomy on 8/19/20    Interval History: The patient was seen and examined at the bedside. NAEON. Tolerating PO intake. VSS. Pain improved significantly in R knee and R ankle, able to ambulate. Still having significant L shoulder pain, improved some from yesterday.       Review of patient's allergies indicates:   Allergen Reactions    Penicillins Hives     Unclear of last episode of allergic reaction       Current Facility-Administered Medications   Medication    acetaminophen tablet 650 mg    amitriptyline tablet 25 mg    ARIPiprazole tablet 5 mg    aspirin EC tablet 81 mg    cefTRIAXone (ROCEPHIN) 2 g/50 mL D5W IVPB    cloNIDine tablet 0.1 mg    dextrose 50% injection 12.5 g    dextrose 50% injection 25 g    dicyclomine capsule 10 mg    FLUoxetine capsule 10 mg    gabapentin capsule 300 mg    glucagon (human recombinant) injection 1 mg    glucose chewable tablet 16 g    glucose chewable tablet 24 g    HYDROmorphone injection 1 mg    hydrOXYzine pamoate capsule 50 mg    ibuprofen tablet 800 mg    loperamide capsule 2 mg    LORazepam tablet 0.5 mg    melatonin tablet 6 mg    methocarbamoL tablet 750 mg    naloxone 0.4 mg/mL injection 0.4 mg    nicotine 21 mg/24 hr 1 patch    ondansetron  "disintegrating tablet 8 mg    oxyCODONE immediate release tablet Tab 10 mg    pantoprazole EC tablet 40 mg    prochlorperazine tablet 10 mg    senna-docusate 8.6-50 mg per tablet 1 tablet    sodium chloride 0.9% flush 10 mL    vancomycin 1.25 g in dextrose 5% 250 mL IVPB (ready to mix)     Objective:     Vital Signs (Most Recent):  Temp: 98 °F (36.7 °C) (08/23/20 0406)  Pulse: 98 (08/23/20 0406)  Resp: 18 (08/23/20 0649)  BP: 120/60 (08/23/20 0406)  SpO2: 99 % (08/23/20 0406) Vital Signs (24h Range):  Temp:  [98 °F (36.7 °C)-98.7 °F (37.1 °C)] 98 °F (36.7 °C)  Pulse:  [87-98] 98  Resp:  [16-20] 18  SpO2:  [94 %-99 %] 99 %  BP: (113-120)/(60-76) 120/60     Weight: 63.5 kg (140 lb)  Height: 5' 8" (172.7 cm)  Body mass index is 21.29 kg/m².      Intake/Output Summary (Last 24 hours) at 8/23/2020 0802  Last data filed at 8/23/2020 0650  Gross per 24 hour   Intake 1420 ml   Output 1890 ml   Net -470 ml       Ortho/SPM Exam     LUE  Dressings c,d,i  Pain with passive shoulder ROM  SILT M/U/R  Motor intact AIN/PIN/M/U/R   Cap refill < 2s  2+ RP    RLE:  Dressings to ankle and knee c,d,i  Soreness with ankle and knee passive  SILT Sa/Bell/DP/SP/T  Motor intact EHL/FHL/TA/Gastroc  2+ DP, 2+ PT          Significant Labs:   CBC:   Recent Labs   Lab 08/22/20 0442 08/23/20  0439   WBC 7.45 8.25   HGB 11.4* 11.4*   HCT 34.6* 35.7*    328     CMP:   Recent Labs   Lab 08/22/20 0442 08/23/20  0439    138   K 3.2* 3.9    105   CO2 25 23   GLU 96 110   BUN 11 11   CREATININE 0.7 0.6   CALCIUM 8.9 9.0   PROT 7.0 7.1   ALBUMIN 2.9* 2.8*   BILITOT 0.4 0.3   ALKPHOS 72 68   AST 12 10   ALT 13 10   ANIONGAP 12 10   EGFRNONAA >60.0 >60.0     CRP:   Recent Labs   Lab 08/22/20  1014   CRP 93.0*     All pertinent labs within the past 24 hours have been reviewed.    Significant Imaging: I have reviewed all pertinent imaging results/findings.    Assessment/Plan:     Septic arthritis of knee, right  Bebo CHRISTIANO Koroma is a " 24 y.o. male IVDU presenting with fevers, left shoulder pain, right knee pain, right ankle pain s/p L shoulder arthroscopy, R knee arthroscopy and R ankle arthrotomy on 8/19/20. R knee and R ankle pain well controlled. L shoulder pain significant with ROM, improved slightly from yesterday. Continue iv abx per ID recs. No plans for operative intervention at this time.     -Trend ESR and CRP daily, trending down yesterday  -Follow cultures, ngtd  -PT/OT WBAT in all joints  -Abx, continue vanc/rocephin;  ID recs once cx and sensitivities  -DVT asa 81 daily per medicine team  -No plans for additional operative intervention at this time.                Tian Bucio MD  Orthopedics  [facility]

## 2020-08-23 NOTE — ASSESSMENT & PLAN NOTE
Pt reports hx of anxiety/depression on Abilify and Prozac at home. He states he takes Prozac 40 mg daily and Abilify 20 mg daily which is different than how is documented in the chart. Patient is having mood swings with outbursts due to his pain not being controlled. Some of this may be due to his underlying psych history.     Plan:  -- Psych consulted for medication recs. Following  -- Abilify 5 mg daily  -- Increased Prozac to 20 mg daily to see if it helps with his mood.

## 2020-08-24 LAB
ALBUMIN SERPL BCP-MCNC: 2.7 G/DL (ref 3.5–5.2)
ALP SERPL-CCNC: 64 U/L (ref 55–135)
ALT SERPL W/O P-5'-P-CCNC: 10 U/L (ref 10–44)
ANION GAP SERPL CALC-SCNC: 9 MMOL/L (ref 8–16)
AST SERPL-CCNC: 15 U/L (ref 10–40)
BACTERIA BLD CULT: NORMAL
BASOPHILS # BLD AUTO: 0.05 K/UL (ref 0–0.2)
BASOPHILS NFR BLD: 0.6 % (ref 0–1.9)
BILIRUB SERPL-MCNC: 0.3 MG/DL (ref 0.1–1)
BUN SERPL-MCNC: 16 MG/DL (ref 6–20)
CALCIUM SERPL-MCNC: 8.9 MG/DL (ref 8.7–10.5)
CHLORIDE SERPL-SCNC: 103 MMOL/L (ref 95–110)
CO2 SERPL-SCNC: 26 MMOL/L (ref 23–29)
CREAT SERPL-MCNC: 0.7 MG/DL (ref 0.5–1.4)
CRP SERPL-MCNC: 100.4 MG/L (ref 0–8.2)
DIFFERENTIAL METHOD: ABNORMAL
EOSINOPHIL # BLD AUTO: 0.4 K/UL (ref 0–0.5)
EOSINOPHIL NFR BLD: 4.3 % (ref 0–8)
ERYTHROCYTE [DISTWIDTH] IN BLOOD BY AUTOMATED COUNT: 13 % (ref 11.5–14.5)
ERYTHROCYTE [SEDIMENTATION RATE] IN BLOOD BY WESTERGREN METHOD: 115 MM/HR (ref 0–23)
EST. GFR  (AFRICAN AMERICAN): >60 ML/MIN/1.73 M^2
EST. GFR  (NON AFRICAN AMERICAN): >60 ML/MIN/1.73 M^2
GLUCOSE SERPL-MCNC: 87 MG/DL (ref 70–110)
HCT VFR BLD AUTO: 35.6 % (ref 40–54)
HGB BLD-MCNC: 11.4 G/DL (ref 14–18)
IMM GRANULOCYTES # BLD AUTO: 0.1 K/UL (ref 0–0.04)
IMM GRANULOCYTES NFR BLD AUTO: 1.2 % (ref 0–0.5)
LYMPHOCYTES # BLD AUTO: 2 K/UL (ref 1–4.8)
LYMPHOCYTES NFR BLD: 22.8 % (ref 18–48)
MAGNESIUM SERPL-MCNC: 2 MG/DL (ref 1.6–2.6)
MCH RBC QN AUTO: 30.7 PG (ref 27–31)
MCHC RBC AUTO-ENTMCNC: 32 G/DL (ref 32–36)
MCV RBC AUTO: 96 FL (ref 82–98)
MONOCYTES # BLD AUTO: 1 K/UL (ref 0.3–1)
MONOCYTES NFR BLD: 11.8 % (ref 4–15)
NEUTROPHILS # BLD AUTO: 5.1 K/UL (ref 1.8–7.7)
NEUTROPHILS NFR BLD: 59.3 % (ref 38–73)
NRBC BLD-RTO: 0 /100 WBC
PHOSPHATE SERPL-MCNC: 4.8 MG/DL (ref 2.7–4.5)
PLATELET # BLD AUTO: 364 K/UL (ref 150–350)
PMV BLD AUTO: 10.2 FL (ref 9.2–12.9)
POTASSIUM SERPL-SCNC: 4 MMOL/L (ref 3.5–5.1)
PROT SERPL-MCNC: 7.1 G/DL (ref 6–8.4)
RBC # BLD AUTO: 3.71 M/UL (ref 4.6–6.2)
SODIUM SERPL-SCNC: 138 MMOL/L (ref 136–145)
WBC # BLD AUTO: 8.63 K/UL (ref 3.9–12.7)

## 2020-08-24 PROCEDURE — 86140 C-REACTIVE PROTEIN: CPT

## 2020-08-24 PROCEDURE — S4991 NICOTINE PATCH NONLEGEND: HCPCS | Performed by: STUDENT IN AN ORGANIZED HEALTH CARE EDUCATION/TRAINING PROGRAM

## 2020-08-24 PROCEDURE — 80053 COMPREHEN METABOLIC PANEL: CPT

## 2020-08-24 PROCEDURE — 99232 SBSQ HOSP IP/OBS MODERATE 35: CPT | Mod: ,,, | Performed by: INTERNAL MEDICINE

## 2020-08-24 PROCEDURE — 25000003 PHARM REV CODE 250: Performed by: INTERNAL MEDICINE

## 2020-08-24 PROCEDURE — 63600175 PHARM REV CODE 636 W HCPCS: Performed by: STUDENT IN AN ORGANIZED HEALTH CARE EDUCATION/TRAINING PROGRAM

## 2020-08-24 PROCEDURE — 85652 RBC SED RATE AUTOMATED: CPT

## 2020-08-24 PROCEDURE — 83735 ASSAY OF MAGNESIUM: CPT

## 2020-08-24 PROCEDURE — 25000003 PHARM REV CODE 250: Performed by: ORTHOPAEDIC SURGERY

## 2020-08-24 PROCEDURE — 25000003 PHARM REV CODE 250: Performed by: STUDENT IN AN ORGANIZED HEALTH CARE EDUCATION/TRAINING PROGRAM

## 2020-08-24 PROCEDURE — 84100 ASSAY OF PHOSPHORUS: CPT

## 2020-08-24 PROCEDURE — 63600175 PHARM REV CODE 636 W HCPCS: Performed by: INTERNAL MEDICINE

## 2020-08-24 PROCEDURE — 11000001 HC ACUTE MED/SURG PRIVATE ROOM

## 2020-08-24 PROCEDURE — 85025 COMPLETE CBC W/AUTO DIFF WBC: CPT

## 2020-08-24 PROCEDURE — 99232 PR SUBSEQUENT HOSPITAL CARE,LEVL II: ICD-10-PCS | Mod: ,,, | Performed by: INTERNAL MEDICINE

## 2020-08-24 PROCEDURE — 99900035 HC TECH TIME PER 15 MIN (STAT)

## 2020-08-24 PROCEDURE — 36415 COLL VENOUS BLD VENIPUNCTURE: CPT

## 2020-08-24 PROCEDURE — 94761 N-INVAS EAR/PLS OXIMETRY MLT: CPT

## 2020-08-24 RX ORDER — GABAPENTIN 300 MG/1
300 CAPSULE ORAL 3 TIMES DAILY
Status: ON HOLD | COMMUNITY
End: 2020-08-26 | Stop reason: HOSPADM

## 2020-08-24 RX ORDER — BUPROPION HYDROCHLORIDE 150 MG/1
150 TABLET ORAL DAILY
Status: ON HOLD | COMMUNITY
End: 2020-08-26 | Stop reason: HOSPADM

## 2020-08-24 RX ORDER — CLONIDINE HYDROCHLORIDE 0.1 MG/1
0.1 TABLET ORAL 3 TIMES DAILY
Status: ON HOLD | COMMUNITY
End: 2020-08-26 | Stop reason: HOSPADM

## 2020-08-24 RX ADMIN — METHOCARBAMOL TABLETS 750 MG: 750 TABLET, COATED ORAL at 05:08

## 2020-08-24 RX ADMIN — VANCOMYCIN HYDROCHLORIDE 1500 MG: 1.5 INJECTION, POWDER, LYOPHILIZED, FOR SOLUTION INTRAVENOUS at 05:08

## 2020-08-24 RX ADMIN — HYDROMORPHONE HYDROCHLORIDE 2 MG: 1 INJECTION, SOLUTION INTRAMUSCULAR; INTRAVENOUS; SUBCUTANEOUS at 05:08

## 2020-08-24 RX ADMIN — ACETAMINOPHEN 650 MG: 325 TABLET ORAL at 03:08

## 2020-08-24 RX ADMIN — HYDROMORPHONE HYDROCHLORIDE 2 MG: 1 INJECTION, SOLUTION INTRAMUSCULAR; INTRAVENOUS; SUBCUTANEOUS at 09:08

## 2020-08-24 RX ADMIN — GABAPENTIN 300 MG: 300 CAPSULE ORAL at 08:08

## 2020-08-24 RX ADMIN — GABAPENTIN 300 MG: 300 CAPSULE ORAL at 09:08

## 2020-08-24 RX ADMIN — PANTOPRAZOLE SODIUM 40 MG: 40 TABLET, DELAYED RELEASE ORAL at 09:08

## 2020-08-24 RX ADMIN — IBUPROFEN 800 MG: 400 TABLET, FILM COATED ORAL at 09:08

## 2020-08-24 RX ADMIN — OXYCODONE HYDROCHLORIDE 10 MG: 10 TABLET ORAL at 05:08

## 2020-08-24 RX ADMIN — METHOCARBAMOL TABLETS 750 MG: 750 TABLET, COATED ORAL at 11:08

## 2020-08-24 RX ADMIN — CEFTRIAXONE 2 G: 2 INJECTION, SOLUTION INTRAVENOUS at 09:08

## 2020-08-24 RX ADMIN — ACETAMINOPHEN 650 MG: 325 TABLET ORAL at 09:08

## 2020-08-24 RX ADMIN — IBUPROFEN 800 MG: 400 TABLET, FILM COATED ORAL at 03:08

## 2020-08-24 RX ADMIN — AMITRIPTYLINE HYDROCHLORIDE 25 MG: 25 TABLET, FILM COATED ORAL at 08:08

## 2020-08-24 RX ADMIN — HYDROMORPHONE HYDROCHLORIDE 2 MG: 1 INJECTION, SOLUTION INTRAMUSCULAR; INTRAVENOUS; SUBCUTANEOUS at 01:08

## 2020-08-24 RX ADMIN — VANCOMYCIN HYDROCHLORIDE 1500 MG: 1.5 INJECTION, POWDER, LYOPHILIZED, FOR SOLUTION INTRAVENOUS at 10:08

## 2020-08-24 RX ADMIN — OXYCODONE HYDROCHLORIDE 10 MG: 10 TABLET ORAL at 08:08

## 2020-08-24 RX ADMIN — NICOTINE 1 PATCH: 21 PATCH, EXTENDED RELEASE TRANSDERMAL at 09:08

## 2020-08-24 RX ADMIN — ARIPIPRAZOLE 5 MG: 5 TABLET ORAL at 09:08

## 2020-08-24 RX ADMIN — OXYCODONE HYDROCHLORIDE 10 MG: 10 TABLET ORAL at 11:08

## 2020-08-24 RX ADMIN — FLUOXETINE 20 MG: 20 CAPSULE ORAL at 09:08

## 2020-08-24 RX ADMIN — VANCOMYCIN HYDROCHLORIDE 1500 MG: 1.5 INJECTION, POWDER, LYOPHILIZED, FOR SOLUTION INTRAVENOUS at 01:08

## 2020-08-24 RX ADMIN — ASPIRIN 81 MG: 81 TABLET, COATED ORAL at 09:08

## 2020-08-24 NOTE — PLAN OF CARE
"Sw did receive call from Kirstin with [facility] who informed Sw that "we do not have a physician that accepts medicaid." Sw to follow.   "

## 2020-08-24 NOTE — ASSESSMENT & PLAN NOTE
"24 year old M with a PMHx of IV drug abuse and depression presented to the ED complaining of left shoulder pain, right knee pain, and right ankle pain. On arrival temp 101.4, pulse 111, elevated CRP and Sed rate. WBC wnl. He has significant decreased ROM of left shoulder and swelling of right knee. Infectious workup started in the ED. Vanc and zosyn given in the ED.    Xray left shoulder, R knee, R ankle, CXR, and US right lower leg: no significant findings    POD 5: arthroscopy R knee and L shoulder, arthrotomy R ankle -  per ortho op-note "I think this is either a very early case of septic arthritis of multiple joints versus inflammatory poly arthritis. Given his febrile state and IV drug abuse I think he should be treated as though the septic arthritis even if his cultures fail to grow anything."    Plan:   -- Ortho consulted for septic arthritis r/o. Follwing recs  -- Aspiration of joints: R knee (31K WBC, 90% segs) R ankle (600 WBC, 43% segs), L shoulder clotted  -- Fluid aspiration gram stain pending  -- Continue Rocephin and Vanc.  -- ID consulted. Following recs.  -- Blood cultures NGTD  -- HIV negative  -- Hepatitis C antibody positive so will need follow up with Infectious Disease in Hepatitis Clinic as outpatient  -- Procalcitonin 0.67  -- Trend ESR and CRP daily  -- Working on placement for continued antibiotics.       "

## 2020-08-24 NOTE — PLAN OF CARE
CM supervisor received call from Dr Greene regarding dc need of IV antibiotics for 4 weeks, but pt does have a history of IVDU.  CM supervisor contacted [facility] they are not open 7 days a week, nor is a system in place for this patient population, currently.  CM Team will pursue placement, referrals being sent.

## 2020-08-24 NOTE — PT/OT/SLP PROGRESS
Occupational Therapy      Patient Name:  Bebo Koroma   MRN:  [id number]    Patient not seen today secondary to patient declining therapy due to pain. Patient reported that he was hurting so bad that he is unable to move his L UE at the time. Patient re-educated on role of OT and POC. Patient was also seen by this therapist ambulating independently in [facility] to attempt . Will follow-up for next treatment session as scheduled.    KRISTEL Man  8/24/2020

## 2020-08-24 NOTE — PHARMACY MED REC
"Admission Medication Reconciliation - Pharmacy Consult Note    The home medication history was taken by Janel Haynes CPhT.  Based on information gathered and subsequent review by the clinical pharmacist, the items below may need attention.     You may go to "Admission" then "Reconcile Home Medications" tabs to review and/or act upon these items.     Potentially problematic discrepancies with current MAR  o Patient IS taking the following which was not ordered upon admit  o Clonidine 0.1 mg by mouth three times daily (was taking TID but last fill was for daily, patient reports continuing to take it TID)  o Wellbutrin 150 mg XR once daily    Potential issues to be addressed PRIOR TO DISCHARGE  o Establish how patient should be using clonidine at home.  o Patient was using suboxone films twice daily, plan to continue once acute pain is controlled.    Please address this information as you see fit.  Feel free to contact us if you have any questions or require assistance.    Ksenia Staley, PharmD  PGY-1 Pharmacy Resident  [id number]                  .    .            "

## 2020-08-24 NOTE — SUBJECTIVE & OBJECTIVE
Principal Problem:SIRS (systemic inflammatory response syndrome)    Principal Orthopedic Problem: s/p L shoulder arthroscopy, R knee arthroscopy and R ankle arthrotomy on 8/19/20    Interval History: The patient was seen and examined at the bedside. NAEON. Tolerating PO intake. VSS. Denies R knee and R ankle pain. L shoulder pain improving today. CRP trending down to 63 from 100.4. WBC stable.       Review of patient's allergies indicates:   Allergen Reactions    Penicillins Hives     Unclear of last episode of allergic reaction  Tolerated ceftriaxone 08/2020       Current Facility-Administered Medications   Medication    acetaminophen tablet 650 mg    amitriptyline tablet 25 mg    ARIPiprazole tablet 5 mg    aspirin EC tablet 81 mg    cefTRIAXone (ROCEPHIN) 2 g/50 mL D5W IVPB    cloNIDine tablet 0.1 mg    dextrose 50% injection 12.5 g    dextrose 50% injection 25 g    dicyclomine capsule 10 mg    FLUoxetine capsule 20 mg    gabapentin capsule 300 mg    glucagon (human recombinant) injection 1 mg    glucose chewable tablet 16 g    glucose chewable tablet 24 g    HYDROmorphone injection 2 mg    hydrOXYzine pamoate capsule 50 mg    ibuprofen tablet 800 mg    loperamide capsule 2 mg    LORazepam tablet 0.5 mg    melatonin tablet 6 mg    methocarbamoL tablet 750 mg    naloxone 0.4 mg/mL injection 0.4 mg    nicotine 21 mg/24 hr 1 patch    ondansetron disintegrating tablet 8 mg    oxyCODONE immediate release tablet Tab 10 mg    pantoprazole EC tablet 40 mg    prochlorperazine tablet 10 mg    senna-docusate 8.6-50 mg per tablet 1 tablet    sodium chloride 0.9% flush 10 mL    vancomycin 1.5 g in dextrose 5 % 250 mL IVPB (ready to mix)     Objective:     Vital Signs (Most Recent):  Temp: 98.4 °F (36.9 °C) (08/24/20 0910)  Pulse: 98 (08/24/20 0910)  Resp: 16 (08/24/20 0912)  BP: 111/60 (08/24/20 0910)  SpO2: 99 % (08/24/20 0910) Vital Signs (24h Range):  Temp:  [97.6 °F (36.4 °C)-98.6 °F (37  "°C)] 98.4 °F (36.9 °C)  Pulse:  [70-99] 98  Resp:  [16-19] 16  SpO2:  [96 %-100 %] 99 %  BP: (102-112)/(57-62) 111/60     Weight: 63.5 kg (140 lb)  Height: 5' 8" (172.7 cm)  Body mass index is 21.29 kg/m².      Intake/Output Summary (Last 24 hours) at 8/24/2020 1110  Last data filed at 8/23/2020 2039  Gross per 24 hour   Intake 240 ml   Output 300 ml   Net -60 ml       Ortho/SPM Exam     LUE  Dressings c,d,i  Pain with shoulder ROM improving  SILT M/U/R  Motor intact AIN/PIN/M/U/R   Cap refill < 2s  2+ RP    RLE:  Dressings to ankle and knee c,d,i  Soreness with ankle and knee passive  SILT Sa/Bell/DP/SP/T  Motor intact EHL/FHL/TA/Gastroc  2+ DP, 2+ PT          Significant Labs:   CBC:   Recent Labs   Lab 08/23/20  0439 08/24/20  0341   WBC 8.25 8.63   HGB 11.4* 11.4*   HCT 35.7* 35.6*    364*     CMP:   Recent Labs   Lab 08/23/20  0439 08/24/20  0341    138   K 3.9 4.0    103   CO2 23 26    87   BUN 11 16   CREATININE 0.6 0.7   CALCIUM 9.0 8.9   PROT 7.1 7.1   ALBUMIN 2.8* 2.7*   BILITOT 0.3 0.3   ALKPHOS 68 64   AST 10 15   ALT 10 10   ANIONGAP 10 9   EGFRNONAA >60.0 >60.0     CRP:   Recent Labs   Lab 08/23/20  1221 08/24/20  0341   .6* 100.4*     All pertinent labs within the past 24 hours have been reviewed.    Significant Imaging: I have reviewed all pertinent imaging results/findings.  "

## 2020-08-24 NOTE — PLAN OF CARE
Pt will need 4 weeks of long term abx vanco 1250mg every 8hrs and ceftriaxone 2gms every 24hrs per ID note dated 8/23. [facility].     08/24/20 1313   Discharge Reassessment   Assessment Type Discharge Planning Reassessment   Provided patient/caregiver education on the expected discharge date and the discharge plan Yes   Do you have any problems affording any of your prescribed medications? No   Discharge Plan A Long-term acute care facility (LTAC)   Discharge Plan B Long-term acute care facility (LTAC)   DME Needed Upon Discharge  none   Anticipated Discharge Disposition LTAC   Can the patient/caregiver answer the patient profile reliably? Yes, cognitively intact   How does the patient rate their overall health at the present time? Good   Describe the patient's ability to walk at the present time. No restrictions   How often would a person be available to care for the patient? Occasionally   Number of comorbid conditions (as recorded on the chart) Two   Post-Acute Status   Post-Acute Authorization Placement   Post-Acute Placement Status Awaiting Internal Medical Clearance   Discharge Delays None known at this time   Elham Nobles, MSN  Case Management  Ext [phone]

## 2020-08-24 NOTE — SUBJECTIVE & OBJECTIVE
Interval History: NAEON. Pt reports pain is better controlled today. Working with CM on placement for continued antibiotics.     Review of Systems   Constitutional: Negative for appetite change and fever.   HENT: Negative for congestion, sore throat and trouble swallowing.    Eyes: Negative for photophobia, pain and discharge.   Respiratory: Negative for cough and shortness of breath.    Cardiovascular: Negative for chest pain and palpitations.   Gastrointestinal: Negative for abdominal pain, diarrhea, nausea and vomiting.   Genitourinary: Negative for difficulty urinating.   Musculoskeletal: Positive for arthralgias and joint swelling. Negative for back pain, myalgias and neck pain.   Skin: Negative for pallor and rash.   Neurological: Negative for light-headedness, numbness and headaches.     Objective:     Vital Signs (Most Recent):  Temp: 98.6 °F (37 °C) (08/24/20 1153)  Pulse: 89 (08/24/20 1153)  Resp: 18 (08/24/20 1153)  BP: (!) 109/56 (08/24/20 1153)  SpO2: 98 % (08/24/20 1153) Vital Signs (24h Range):  Temp:  [97.6 °F (36.4 °C)-98.6 °F (37 °C)] 98.6 °F (37 °C)  Pulse:  [70-98] 89  Resp:  [16-18] 18  SpO2:  [96 %-99 %] 98 %  BP: (102-112)/(56-62) 109/56     Weight: 63.5 kg (140 lb)  Body mass index is 21.29 kg/m².    Intake/Output Summary (Last 24 hours) at 8/24/2020 1302  Last data filed at 8/23/2020 2039  Gross per 24 hour   Intake 240 ml   Output 300 ml   Net -60 ml      Physical Exam  Constitutional:       Appearance: Normal appearance.   Eyes:      Conjunctiva/sclera: Conjunctivae normal.      Pupils: Pupils are equal, round, and reactive to light.   Cardiovascular:      Rate and Rhythm: Normal rate and regular rhythm.      Pulses: Normal pulses.      Heart sounds: Normal heart sounds.   Pulmonary:      Effort: Pulmonary effort is normal. No respiratory distress.      Breath sounds: Normal breath sounds.   Abdominal:      General: Bowel sounds are normal. There is no distension.      Palpations: Abdomen  is soft.      Tenderness: There is no abdominal tenderness.   Musculoskeletal:         General: Swelling and tenderness present.      Comments: Dressings in place to L shoulder, R knee, R ankle.    Skin:     General: Skin is warm.      Findings: No bruising or rash.   Neurological:      Mental Status: He is alert and oriented to person, place, and time.   Psychiatric:         Mood and Affect: Affect is angry.         Behavior: Behavior is agitated.         Significant Labs:   BMP:   Recent Labs   Lab 08/24/20  0341   GLU 87      K 4.0      CO2 26   BUN 16   CREATININE 0.7   CALCIUM 8.9   MG 2.0     CBC:   Recent Labs   Lab 08/23/20  0439 08/24/20  0341   WBC 8.25 8.63   HGB 11.4* 11.4*   HCT 35.7* 35.6*    364*       Significant Imaging: I have reviewed all pertinent imaging results/findings within the past 24 hours.

## 2020-08-24 NOTE — PLAN OF CARE
08/24/20 0852   Post-Acute Status   Post-Acute Authorization Placement   Post-Acute Placement Status Referrals Sent   Discharge Delays (!) Patient and Family Barriers     Sw to send some LTAC referrals for Pt who will need 4 weeks of abx at d/c and has an IVDU history and not able to d/c home. Met with Pt, Pt to stay in the [place] area and agreeable to facility. Sw sent 3 LTAC referrals in Pt's area and will follow with acceptance.

## 2020-08-24 NOTE — PROGRESS NOTES
[facility] Hospital Medicine  Progress Note    Patient Name: Bebo Koroma  MRN: [id number]  Patient Class: IP- Inpatient   Admission Date: 8/19/2020  Length of Stay: 4 days  Attending Physician: Ronna Greene MD  Primary Care Provider: Primary Doctor [facility] Medicine Team: [facility] 2 Daly Hale MD    Subjective:     Principal Problem:SIRS (systemic inflammatory response syndrome)        HPI:  Mr. Koroma is a 24 year old M with a PMHx of IV drug abuse and depression that presented to the ED complaining of left shoulder pain, right knee pain, and right ankle pain. Pain started when the patient woke up about 3 days ago, mostly left shoulder pain at that time. He then developed right ankle pain and right knee pain. He describes the pain as constant, sharp and worse with movement. Shoulder pain radiates down to the antecubital fossa region. He tried taking Tylenol with no relief. He has experienced significant decrease in ROM of left shoulder and states that he could not walk for 2 days due to his pain. Reports subjective fevers, chills, fatigue, general weakness, night sweats. Denies N/V, chest pain, SOB, abdominal pain, diarrhea, pain with urination. Patient reports that he last used IV opioids about 3 days ago. Injects into the left arm. On Subox-one at home but has not taken in 3 days. Denies alcohol use and other recreational drugs.     Pt arrived to ED with temp of 101.4, tachycardic elevated CRP and ESR. WBC and lactate wnl. Blood cultures done and 1 dose of vanc and zosyn given along with IVF. Xray L shoulder with no significant findings.     Overview/Hospital Course:  Pt admitted to IM 2 for further management of sepsis (source unknown at the time). Ortho consulted to r/o septic arthritis. Xray of right knee and ankle ordered. Psych consulted for management of addiction and medications (on Abilify, Prozac, and Subox-one at home). R knee tapped (31K WBC, 90% segs) R ankle  tapped (600 WBC, 43% seg), L shoulder tapped (clotted). Cultures pending, NGTD. Taken to the OR on 8/19 for arthroscopy of knee and shoulder and arthrotomy of ankle. 8/20 pt in pain, crying, sweating with chills. Subox-one unable to be restarted due to patient being on opioids. Adjusted pain medications for better pain control. Started opioid withdrawal protocol per psych recs. Restarting Abilify and Prozac per psych recs. Patient has had outburst with staff, stating his pain is not being controlled. Acute pain service consulted for further recs. Increased Prozac to 20 mg daily due to patient stating he takes 40 mg at home and his mood swings.     Interval History: NAEON. Pt reports pain is better controlled today. Working with CM on placement for continued antibiotics.     Review of Systems   Constitutional: Negative for appetite change and fever.   HENT: Negative for congestion, sore throat and trouble swallowing.    Eyes: Negative for photophobia, pain and discharge.   Respiratory: Negative for cough and shortness of breath.    Cardiovascular: Negative for chest pain and palpitations.   Gastrointestinal: Negative for abdominal pain, diarrhea, nausea and vomiting.   Genitourinary: Negative for difficulty urinating.   Musculoskeletal: Positive for arthralgias and joint swelling. Negative for back pain, myalgias and neck pain.   Skin: Negative for pallor and rash.   Neurological: Negative for light-headedness, numbness and headaches.     Objective:     Vital Signs (Most Recent):  Temp: 98.6 °F (37 °C) (08/24/20 1153)  Pulse: 89 (08/24/20 1153)  Resp: 18 (08/24/20 1153)  BP: (!) 109/56 (08/24/20 1153)  SpO2: 98 % (08/24/20 1153) Vital Signs (24h Range):  Temp:  [97.6 °F (36.4 °C)-98.6 °F (37 °C)] 98.6 °F (37 °C)  Pulse:  [70-98] 89  Resp:  [16-18] 18  SpO2:  [96 %-99 %] 98 %  BP: (102-112)/(56-62) 109/56     Weight: 63.5 kg (140 lb)  Body mass index is 21.29 kg/m².    Intake/Output Summary (Last 24 hours) at  8/24/2020 1302  Last data filed at 8/23/2020 2039  Gross per 24 hour   Intake 240 ml   Output 300 ml   Net -60 ml      Physical Exam  Constitutional:       Appearance: Normal appearance.   Eyes:      Conjunctiva/sclera: Conjunctivae normal.      Pupils: Pupils are equal, round, and reactive to light.   Cardiovascular:      Rate and Rhythm: Normal rate and regular rhythm.      Pulses: Normal pulses.      Heart sounds: Normal heart sounds.   Pulmonary:      Effort: Pulmonary effort is normal. No respiratory distress.      Breath sounds: Normal breath sounds.   Abdominal:      General: Bowel sounds are normal. There is no distension.      Palpations: Abdomen is soft.      Tenderness: There is no abdominal tenderness.   Musculoskeletal:         General: Swelling and tenderness present.      Comments: Dressings in place to L shoulder, R knee, R ankle.    Skin:     General: Skin is warm.      Findings: No bruising or rash.   Neurological:      Mental Status: He is alert and oriented to person, place, and time.   Psychiatric:         Mood and Affect: Affect is angry.         Behavior: Behavior is agitated.         Significant Labs:   BMP:   Recent Labs   Lab 08/24/20  0341   GLU 87      K 4.0      CO2 26   BUN 16   CREATININE 0.7   CALCIUM 8.9   MG 2.0     CBC:   Recent Labs   Lab 08/23/20  0439 08/24/20  0341   WBC 8.25 8.63   HGB 11.4* 11.4*   HCT 35.7* 35.6*    364*       Significant Imaging: I have reviewed all pertinent imaging results/findings within the past 24 hours.      Assessment/Plan:      * SIRS (systemic inflammatory response syndrome)  24 year old M with a PMHx of IV drug abuse and depression presented to the ED complaining of left shoulder pain, right knee pain, and right ankle pain. On arrival temp 101.4, pulse 111, elevated CRP and Sed rate. WBC wnl. He has significant decreased ROM of left shoulder and swelling of right knee. Infectious workup started in the ED. Vanc and zosyn given in  "the ED.    Xray left shoulder, R knee, R ankle, CXR, and US right lower leg: no significant findings    POD 5: arthroscopy R knee and L shoulder, arthrotomy R ankle -  per ortho op-note "I think this is either a very early case of septic arthritis of multiple joints versus inflammatory poly arthritis. Given his febrile state and IV drug abuse I think he should be treated as though the septic arthritis even if his cultures fail to grow anything."    Plan:   -- Ortho consulted for septic arthritis r/o. Follwing recs  -- Aspiration of joints: R knee (31K WBC, 90% segs) R ankle (600 WBC, 43% segs), L shoulder clotted  -- Fluid aspiration gram stain pending  -- Continue Rocephin and Vanc.  -- ID consulted. Following recs.  -- Blood cultures NGTD  -- HIV negative  -- Hepatitis C antibody positive so will need follow up with Infectious Disease in [facility] as outpatient  -- Procalcitonin 0.67  -- Trend ESR and CRP daily  -- Working on placement for continued antibiotics.         Oligoarthritis of multiple sites - L shoulder, R knee, R ankle  IV drug user presented to the ED complaining of left shoulder pain, right knee pain, and right ankle pain. On arrival temp 101.4, pulse 111, elevated CRP and Sed rate. WBC wnl. He has significant decreased ROM of left shoulder and swelling of right knee. He states his pain has been severe, leading him unable to walk for 2 days.    Xray left shoulder, R knee, R ankle, CXR, and US right lower leg: no significant findings    Plan:   -- Ortho consulted. Following recs. See SIRS  -- Colchicine and Indomethacin d/eduardo  -- Oxycodone 10 mg Q4H PRN  -- Dilaudid 2 mg Q4H PRN  -- Scheduled Tylenol 650 mg TID      IVDU (intravenous drug user)  Pt reports use of IV drugs since the age of 17. States he has consistently used them for 5 years and became sober 6 months ago. He is on Subox-one at home which he last took 3 days ago. He relapsed last week. Last IV opioid use was 3 days ago. Patient " has a 1 week old  at home that could be contributing to the relapse. On exam he was tremulous with mildly dilated pupils. He reports recent sweating and cold chills. He denies N/V diarrhea. With his chronic use, he will likely be hyper-analgesic.     Plan:  -- Clinical Opiate Withdrawal Scale: 4  -- Ativan 0.5 mg PRN  -- Psych consulted for addiction management. Following recs. See opioid dependence.  -- Continue to monitor for signs of withdrawl      Opioid use disorder, severe, dependence  Pt reports IV heroin use since the age of 17. He has consistantly used for the past 5 years. He states he was recently 6 months sober but relapsed 1 week ago. He would like to resume his Subox-one while inpatient, however he must be off opioids for > 20 hours per psych.      Plan:   -- Addiction Psychiatry consulted and following  -- Pain Management Service also consulted for assisstance  -- Clonidine 0.1mg PO q4hr PRN for withdrawal associated HTN  -- Bentyl 10mg PO q6hr PRN for abdominal muscle cramps  -- Loperamide 2mg PO q6hr PRN for diarrhea  -- Robaxin 500mg PO q6hr PRN for muscle spasm  -- Zofran 4mg ODT q8hr PRN for nausea  -- Vistaril 50mg PO qHS PRN for insomnia  -- Tylenol 650 mg TID  -- Elavil 25 mg QHS  -- Abilify 5 mg QD  -- Prozac 20 mg QD  -- Gabapentin 300 mg BID  -- Ibuprofen 800 mg TID with Protonix 40 mg daily    Anxiety and depression  Pt reports hx of anxiety/depression on Abilify and Prozac at home. He states he takes Prozac 40 mg daily and Abilify 20 mg daily which is different than how is documented in the chart. Patient is having mood swings with outbursts due to his pain not being controlled. Some of this may be due to his underlying psych history.     Plan:  -- Psych consulted for medication recs. Following  -- Abilify 5 mg daily  -- Increased Prozac to 20 mg daily to see if it helps with his mood.    Opioid withdrawal  See Opioid use disorder, severe, dependence      Septic arthritis of right  ankle  See SIRS (systemic inflammatory response syndrome)      Septic arthritis of shoulder, left  See SIRS (systemic inflammatory response syndrome)      Septic arthritis of knee, right  See SIRS (systemic inflammatory response syndrome)      Elevated C-reactive protein (CRP)  See SIRS (systemic inflammatory response syndrome)      Elevated erythrocyte sedimentation rate  See SIRS (systemic inflammatory response syndrome)        VTE Risk Mitigation (From admission, onward)         Ordered     Place sequential compression device  Until discontinued      08/19/20 1643     IP VTE LOW RISK PATIENT  Once      08/19/20 1643                Discharge Planning   TITA: 8/24/2020     Code Status: Full Code   Is the patient medically ready for discharge?: Yes    Reason for patient still in hospital (select all that apply): Treatment  Discharge Plan A: Home   Discharge Delays: (!) Patient and Family Barriers              Daly Hale MD  Department of Hospital Medicine   [facility]

## 2020-08-24 NOTE — PT/OT/SLP PROGRESS
Physical Therapy Discharge      Bebo Koroma   MRN: [id number]     Pt observed ambulating hallways >200ft (I). Pt witnessed multi-tasking by holding breakfast in one hand and texting with the other hand while amb. Pt later refused OT session. D/t observed mobility pt no longer demonstrates acute PT needs and is safe to continue mobility w/NSG.     Mina King, PT, DPT  8/24/2020

## 2020-08-25 LAB
ALBUMIN SERPL BCP-MCNC: 3.2 G/DL (ref 3.5–5.2)
ALP SERPL-CCNC: 68 U/L (ref 55–135)
ALT SERPL W/O P-5'-P-CCNC: 11 U/L (ref 10–44)
ANION GAP SERPL CALC-SCNC: 8 MMOL/L (ref 8–16)
AST SERPL-CCNC: 12 U/L (ref 10–40)
BASOPHILS # BLD AUTO: 0.04 K/UL (ref 0–0.2)
BASOPHILS NFR BLD: 0.4 % (ref 0–1.9)
BILIRUB SERPL-MCNC: 0.4 MG/DL (ref 0.1–1)
BUN SERPL-MCNC: 14 MG/DL (ref 6–20)
CALCIUM SERPL-MCNC: 9.7 MG/DL (ref 8.7–10.5)
CHLORIDE SERPL-SCNC: 102 MMOL/L (ref 95–110)
CO2 SERPL-SCNC: 28 MMOL/L (ref 23–29)
CREAT SERPL-MCNC: 0.7 MG/DL (ref 0.5–1.4)
CRP SERPL-MCNC: 63 MG/L (ref 0–8.2)
DIFFERENTIAL METHOD: ABNORMAL
EOSINOPHIL # BLD AUTO: 0.3 K/UL (ref 0–0.5)
EOSINOPHIL NFR BLD: 3.1 % (ref 0–8)
ERYTHROCYTE [DISTWIDTH] IN BLOOD BY AUTOMATED COUNT: 13 % (ref 11.5–14.5)
ERYTHROCYTE [SEDIMENTATION RATE] IN BLOOD BY WESTERGREN METHOD: 115 MM/HR (ref 0–23)
EST. GFR  (AFRICAN AMERICAN): >60 ML/MIN/1.73 M^2
EST. GFR  (NON AFRICAN AMERICAN): >60 ML/MIN/1.73 M^2
GLUCOSE SERPL-MCNC: 99 MG/DL (ref 70–110)
HCT VFR BLD AUTO: 37 % (ref 40–54)
HGB BLD-MCNC: 12.1 G/DL (ref 14–18)
IMM GRANULOCYTES # BLD AUTO: 0.11 K/UL (ref 0–0.04)
IMM GRANULOCYTES NFR BLD AUTO: 1 % (ref 0–0.5)
LYMPHOCYTES # BLD AUTO: 2 K/UL (ref 1–4.8)
LYMPHOCYTES NFR BLD: 19.3 % (ref 18–48)
MCH RBC QN AUTO: 30.6 PG (ref 27–31)
MCHC RBC AUTO-ENTMCNC: 32.7 G/DL (ref 32–36)
MCV RBC AUTO: 93 FL (ref 82–98)
MONOCYTES # BLD AUTO: 0.8 K/UL (ref 0.3–1)
MONOCYTES NFR BLD: 7.9 % (ref 4–15)
NEUTROPHILS # BLD AUTO: 7.2 K/UL (ref 1.8–7.7)
NEUTROPHILS NFR BLD: 68.3 % (ref 38–73)
NRBC BLD-RTO: 0 /100 WBC
PLATELET # BLD AUTO: 451 K/UL (ref 150–350)
PMV BLD AUTO: 10.3 FL (ref 9.2–12.9)
POTASSIUM SERPL-SCNC: 4.1 MMOL/L (ref 3.5–5.1)
PROT SERPL-MCNC: 8.1 G/DL (ref 6–8.4)
RBC # BLD AUTO: 3.96 M/UL (ref 4.6–6.2)
SARS-COV-2 RNA RESP QL NAA+PROBE: NOT DETECTED
SODIUM SERPL-SCNC: 138 MMOL/L (ref 136–145)
VANCOMYCIN SERPL-MCNC: 14.3 UG/ML
VANCOMYCIN TROUGH SERPL-MCNC: 20.6 UG/ML (ref 10–22)
WBC # BLD AUTO: 10.48 K/UL (ref 3.9–12.7)

## 2020-08-25 PROCEDURE — 25000003 PHARM REV CODE 250: Performed by: INTERNAL MEDICINE

## 2020-08-25 PROCEDURE — 25000003 PHARM REV CODE 250: Performed by: STUDENT IN AN ORGANIZED HEALTH CARE EDUCATION/TRAINING PROGRAM

## 2020-08-25 PROCEDURE — 63600175 PHARM REV CODE 636 W HCPCS: Performed by: STUDENT IN AN ORGANIZED HEALTH CARE EDUCATION/TRAINING PROGRAM

## 2020-08-25 PROCEDURE — 80053 COMPREHEN METABOLIC PANEL: CPT

## 2020-08-25 PROCEDURE — S4991 NICOTINE PATCH NONLEGEND: HCPCS | Performed by: STUDENT IN AN ORGANIZED HEALTH CARE EDUCATION/TRAINING PROGRAM

## 2020-08-25 PROCEDURE — U0003 INFECTIOUS AGENT DETECTION BY NUCLEIC ACID (DNA OR RNA); SEVERE ACUTE RESPIRATORY SYNDROME CORONAVIRUS 2 (SARS-COV-2) (CORONAVIRUS DISEASE [COVID-19]), AMPLIFIED PROBE TECHNIQUE, MAKING USE OF HIGH THROUGHPUT TECHNOLOGIES AS DESCRIBED BY CMS-2020-01-R: HCPCS

## 2020-08-25 PROCEDURE — 80202 ASSAY OF VANCOMYCIN: CPT

## 2020-08-25 PROCEDURE — 80202 ASSAY OF VANCOMYCIN: CPT | Mod: 91

## 2020-08-25 PROCEDURE — 36415 COLL VENOUS BLD VENIPUNCTURE: CPT

## 2020-08-25 PROCEDURE — 25000003 PHARM REV CODE 250: Performed by: ORTHOPAEDIC SURGERY

## 2020-08-25 PROCEDURE — 97530 THERAPEUTIC ACTIVITIES: CPT

## 2020-08-25 PROCEDURE — 94761 N-INVAS EAR/PLS OXIMETRY MLT: CPT

## 2020-08-25 PROCEDURE — 85025 COMPLETE CBC W/AUTO DIFF WBC: CPT

## 2020-08-25 PROCEDURE — 85652 RBC SED RATE AUTOMATED: CPT

## 2020-08-25 PROCEDURE — 63600175 PHARM REV CODE 636 W HCPCS: Performed by: INTERNAL MEDICINE

## 2020-08-25 PROCEDURE — 99232 PR SUBSEQUENT HOSPITAL CARE,LEVL II: ICD-10-PCS | Mod: ,,, | Performed by: INTERNAL MEDICINE

## 2020-08-25 PROCEDURE — 99232 SBSQ HOSP IP/OBS MODERATE 35: CPT | Mod: ,,, | Performed by: INTERNAL MEDICINE

## 2020-08-25 PROCEDURE — 86140 C-REACTIVE PROTEIN: CPT

## 2020-08-25 PROCEDURE — 11000001 HC ACUTE MED/SURG PRIVATE ROOM

## 2020-08-25 RX ADMIN — ACETAMINOPHEN 650 MG: 325 TABLET ORAL at 08:08

## 2020-08-25 RX ADMIN — PANTOPRAZOLE SODIUM 40 MG: 40 TABLET, DELAYED RELEASE ORAL at 08:08

## 2020-08-25 RX ADMIN — VANCOMYCIN HYDROCHLORIDE 1500 MG: 1.5 INJECTION, POWDER, LYOPHILIZED, FOR SOLUTION INTRAVENOUS at 06:08

## 2020-08-25 RX ADMIN — IBUPROFEN 800 MG: 400 TABLET, FILM COATED ORAL at 08:08

## 2020-08-25 RX ADMIN — VANCOMYCIN HYDROCHLORIDE 1500 MG: 1.5 INJECTION, POWDER, LYOPHILIZED, FOR SOLUTION INTRAVENOUS at 11:08

## 2020-08-25 RX ADMIN — IBUPROFEN 800 MG: 400 TABLET, FILM COATED ORAL at 02:08

## 2020-08-25 RX ADMIN — METHOCARBAMOL TABLETS 750 MG: 750 TABLET, COATED ORAL at 02:08

## 2020-08-25 RX ADMIN — IBUPROFEN 800 MG: 400 TABLET, FILM COATED ORAL at 09:08

## 2020-08-25 RX ADMIN — OXYCODONE HYDROCHLORIDE 10 MG: 10 TABLET ORAL at 04:08

## 2020-08-25 RX ADMIN — OXYCODONE HYDROCHLORIDE 10 MG: 10 TABLET ORAL at 09:08

## 2020-08-25 RX ADMIN — OXYCODONE HYDROCHLORIDE 10 MG: 10 TABLET ORAL at 11:08

## 2020-08-25 RX ADMIN — HYDROMORPHONE HYDROCHLORIDE 2 MG: 1 INJECTION, SOLUTION INTRAMUSCULAR; INTRAVENOUS; SUBCUTANEOUS at 10:08

## 2020-08-25 RX ADMIN — METHOCARBAMOL TABLETS 750 MG: 750 TABLET, COATED ORAL at 05:08

## 2020-08-25 RX ADMIN — AMITRIPTYLINE HYDROCHLORIDE 25 MG: 25 TABLET, FILM COATED ORAL at 09:08

## 2020-08-25 RX ADMIN — HYDROMORPHONE HYDROCHLORIDE 2 MG: 1 INJECTION, SOLUTION INTRAMUSCULAR; INTRAVENOUS; SUBCUTANEOUS at 01:08

## 2020-08-25 RX ADMIN — HYDROMORPHONE HYDROCHLORIDE 2 MG: 1 INJECTION, SOLUTION INTRAMUSCULAR; INTRAVENOUS; SUBCUTANEOUS at 06:08

## 2020-08-25 RX ADMIN — CEFTRIAXONE 2 G: 2 INJECTION, SOLUTION INTRAVENOUS at 08:08

## 2020-08-25 RX ADMIN — ASPIRIN 81 MG: 81 TABLET, COATED ORAL at 08:08

## 2020-08-25 RX ADMIN — HYDROMORPHONE HYDROCHLORIDE 2 MG: 1 INJECTION, SOLUTION INTRAMUSCULAR; INTRAVENOUS; SUBCUTANEOUS at 02:08

## 2020-08-25 RX ADMIN — NICOTINE 1 PATCH: 21 PATCH, EXTENDED RELEASE TRANSDERMAL at 09:08

## 2020-08-25 RX ADMIN — OXYCODONE HYDROCHLORIDE 10 MG: 10 TABLET ORAL at 05:08

## 2020-08-25 RX ADMIN — ACETAMINOPHEN 650 MG: 325 TABLET ORAL at 09:08

## 2020-08-25 RX ADMIN — FLUOXETINE 20 MG: 20 CAPSULE ORAL at 08:08

## 2020-08-25 RX ADMIN — ACETAMINOPHEN 650 MG: 325 TABLET ORAL at 02:08

## 2020-08-25 RX ADMIN — GABAPENTIN 300 MG: 300 CAPSULE ORAL at 08:08

## 2020-08-25 RX ADMIN — GABAPENTIN 300 MG: 300 CAPSULE ORAL at 09:08

## 2020-08-25 RX ADMIN — ARIPIPRAZOLE 5 MG: 5 TABLET ORAL at 08:08

## 2020-08-25 NOTE — PT/OT/SLP PROGRESS
Occupational Therapy   Treatment    Name: Bebo Koroma  MRN: [id number]  Admitting Diagnosis:  SIRS (systemic inflammatory response syndrome)  6 Days Post-Op    Recommendations:     Discharge Recommendations: home health OT  Discharge Equipment Recommendations:  none  Barriers to discharge:  None    Assessment:     Bebo Koroma is a 24 y.o. male with a medical diagnosis of SIRS (systemic inflammatory response syndrome). Performance deficits affecting function are weakness, impaired endurance, impaired self care skills, impaired functional mobilty, gait instability, impaired balance, decreased upper extremity function, pain, decreased ROM, decreased lower extremity function. Patient would benefit from continued skilled acute OT 2x/wk to improve functional mobility, increase independence with ADLs, and address established goals. Recommending HHOT once medically appropriate for discharge to increase maximal independence, reduce burden of care, and ensure safety.     Rehab Prognosis:  Fair; patient would benefit from acute skilled OT services to address these deficits and reach maximum level of function.       Plan:     Patient to be seen 2 x/week to address the above listed problems via self-care/home management, therapeutic activities, therapeutic exercises  · Plan of Care Expires: 09/21/20  · Plan of Care Reviewed with: patient    Subjective     Pain/Comfort:  · Pain Rating 1: 8/10  · Location - Side 1: Left  · Location - Orientation 1: upper  · Location 1: shoulder  · Pain Addressed 1: Reposition, Distraction  · Pain Rating Post-Intervention 1: 8/10    Objective:     Communicated with: OWEN prior to session.  Patient found HOB elevated with telemetry upon OT entry to room.    General Precautions: Standard, fall   Orthopedic Precautions:(WBAT all joints)   Braces:  N/A    Occupational Performance:     Bed Mobility:    · Patient completed Scooting/Bridging with independence  · Patient completed Supine to Sit with  independence  · Patient completed Sit to Supine with independence     Functional Mobility/Transfers:  · Patient completed Sit <> Stand Transfer with independence  with  no assistive device   · Patient completed Toilet Transfer bed<>toilet with functional ambulation technique with independence with  no AD    Activities of Daily Living:  · Grooming: independence simulating task standing at sink for washing/drying hands  · Upper Body Dressing: independent with doffing and donning sweatshirt sitting EOB    · Toileting: independence per patient report and simulating task during toilet transfer    [facility] 6 Click ADL: 21    Treatment & Education:  Role of OT and POC  Safety  ADL retraining  Functional mobility     Patient educated on importance of performing L UE exercises. Patient performed L UE ROM exercises in all available planes and joints focusing to improve strength and endurance to increase independence with ADLs 1 x 10.     Patient left HOB elevated with call button in reach and all needs metEducation:      GOALS:   Multidisciplinary Problems     Occupational Therapy Goals        Problem: Occupational Therapy Goal    Goal Priority Disciplines Outcome Interventions   Occupational Therapy Goal     OT, PT/OT Ongoing, Progressing    Description: Goals to be met by: 9/4/2020     Patient will increase functional independence with ADLs by performing:    UE Dressing with Set-up Assistance. Met  LE Dressing with Stand-by Assistance.  Grooming while standing at sink with Modified [place]. Met simulating task  Toileting from toilet with Supervision for hygiene and clothing management. Met per patient report  Stand pivot transfers with Modified [place]. Met  Toilet transfer to toilet with Modified [place]. Met  Patient will be independent with B UE ROM exercise HEP.                      Time Tracking:     OT Date of Treatment: 08/25/20  OT Start Time: 0937  OT Stop Time: 0947  OT Total Time (min): 10  min    Billable Minutes:Therapeutic Activity 10    KRISTEL Man  8/25/2020

## 2020-08-25 NOTE — PROGRESS NOTES
Pharmacokinetic Assessment Follow Up: IV Vancomycin    Vancomycin serum concentration assessment(s):    The random level was drawn correctly and can be used to guide therapy at this time. The measurement is below the desired definitive target range of 15 to 20 mcg/mL.    Vancomycin Regimen Plan:    Continue regimen to Vancomycin 1500 mg IV every 8 hours with next serum trough concentration measured at 1900 prior to 6th dose on 8/27    Drug levels (last 3 results):  Recent Labs   Lab Result Units 08/23/20  1221 08/25/20  0544 08/25/20  1648   Vancomycin, Random ug/mL  --   --  14.3   Vancomycin-Trough ug/mL 9.4* 20.6  --        Pharmacy will continue to follow and monitor vancomycin.    Please contact pharmacy at extension [phone] for questions regarding this assessment.    Thank you for the consult,   Lucie Torres       Patient brief summary:  Bebo Koroma is a 24 y.o. male initiated on antimicrobial therapy with IV Vancomycin for treatment of bone/joint infection    The patient's current regimen is 1500 mg q8h (previous dose held due to slightly high level).    Drug Allergies:   Review of patient's allergies indicates:   Allergen Reactions    Penicillins Hives     Unclear of last episode of allergic reaction  Tolerated ceftriaxone 08/2020       Actual Body Weight:   63.5 kg    Renal Function:   Estimated Creatinine Clearance: 147.4 mL/min (based on SCr of 0.7 mg/dL).,     Dialysis Method (if applicable):  N/A    CBC (last 72 hours):  Recent Labs   Lab Result Units 08/23/20  0439 08/24/20  0341 08/25/20  0544   WBC K/uL 8.25 8.63 10.48   Hemoglobin g/dL 11.4* 11.4* 12.1*   Hematocrit % 35.7* 35.6* 37.0*   Platelets K/uL 328 364* 451*   Gran% % 64.5 59.3 68.3   Lymph% % 19.0 22.8 19.3   Mono% % 12.4 11.8 7.9   Eosinophil% % 2.5 4.3 3.1   Basophil% % 0.5 0.6 0.4   Differential Method  Automated Automated Automated       Metabolic Panel (last 72 hours):  Recent Labs   Lab Result Units 08/23/20  0439 08/24/20  0341  08/25/20  0545   Sodium mmol/L 138 138 138   Potassium mmol/L 3.9 4.0 4.1   Chloride mmol/L 105 103 102   CO2 mmol/L 23 26 28   Glucose mg/dL 110 87 99   BUN, Bld mg/dL 11 16 14   Creatinine mg/dL 0.6 0.7 0.7   Albumin g/dL 2.8* 2.7* 3.2*   Total Bilirubin mg/dL 0.3 0.3 0.4   Alkaline Phosphatase U/L 68 64 68   AST U/L 10 15 12   ALT U/L 10 10 11   Magnesium mg/dL 1.9 2.0  --    Phosphorus mg/dL 4.7* 4.8*  --        Vancomycin Administrations:  vancomycin given in the last 96 hours                   vancomycin 1.5 g in dextrose 5 % 250 mL IVPB (ready to mix) (mg) 1,500 mg New Bag 08/25/20 0642     1,500 mg New Bag 08/24/20 2200     1,500 mg New Bag  1353     1,500 mg New Bag  0514     1,500 mg New Bag 08/23/20 2309    vancomycin 1.25 g in dextrose 5% 250 mL IVPB (ready to mix) (mg) 1,250 mg New Bag 08/23/20 1300     1,250 mg New Bag  0407     1,250 mg New Bag 08/22/20 2107     1,250 mg New Bag  1258    vancomycin in dextrose 5 % 1 gram/250 mL IVPB 1,000 mg (mg) 1,000 mg New Bag 08/22/20 0556     1,000 mg New Bag 08/21/20 2157                Microbiologic Results:  Microbiology Results (last 7 days)     Procedure Component Value Units Date/Time    Fungus culture [[id number]] Collected: 08/19/20 1802    Order Status: Completed Specimen: Joint Fluid from Knee, Right Updated: 08/25/20 1210     Fungus (Mycology) Culture Culture in progress    Fungus culture [[id number]] Collected: 08/19/20 1805    Order Status: Completed Specimen: Joint Fluid from Shoulder, Left Updated: 08/25/20 1210     Fungus (Mycology) Culture Culture in progress    Fungus culture [[id number]] Collected: 08/19/20 1804    Order Status: Completed Specimen: Joint Fluid from Ankle, Right Updated: 08/25/20 1210     Fungus (Mycology) Culture Culture in progress    Blood culture [[id number]] Collected: 08/19/20 1644    Order Status: Completed Specimen: Blood from Peripheral, Antecubital, Right Updated: 08/24/20 1822     Blood Culture, Routine No growth after 5  days.    Blood culture x two cultures. Draw prior to antibiotics. [[id number]] Collected: 08/19/20 1140    Order Status: Completed Specimen: Blood from Peripheral, Antecubital, Right Updated: 08/24/20 1412     Blood Culture, Routine No growth after 5 days.    Narrative:      Aerobic and anaerobic    Blood culture x two cultures. Draw prior to antibiotics. [[id number]] Collected: 08/19/20 1141    Order Status: Completed Specimen: Blood from Peripheral, Forearm, Right Updated: 08/24/20 1412     Blood Culture, Routine No growth after 5 days.    Narrative:      Aerobic and anaerobic    Culture, Anaerobic [[id number]] Collected: 08/19/20 1805    Order Status: Completed Specimen: Joint Fluid from Shoulder, Left Updated: 08/24/20 0746     Anaerobic Culture Culture in progress    Culture, Anaerobic [[id number]] Collected: 08/19/20 1804    Order Status: Completed Specimen: Joint Fluid from Ankle, Right Updated: 08/24/20 0745     Anaerobic Culture Culture in progress    Culture, Anaerobic [[id number]] Collected: 08/19/20 1802    Order Status: Completed Specimen: Joint Fluid from Knee, Right Updated: 08/24/20 0745     Anaerobic Culture Culture in progress    Chlamydia/Neisseria gonorrhoeae RNA, TMA [[id number]] Collected: 08/19/20 1140    Order Status: Completed Updated: 08/22/20 1022     Neisseria gonorrhoeae RNA, TMA Negative     Comment: Chlamydia trachomatis and Neisseria gonorrhoeae by TMA is   negative, therefore no further testing added.  INTERPRETIVE INFORMATION: CT/NG TMA with Rfx to Confirmation  This test is intended for medical purposes only. It is not   intended for the evaluation of suspected sexual abuse or   for other medicolegal indications. Refer to the most recent   CDC recommendations for patients in whom a false positive   result may have adverse psychosocial impact.  Positive results will be confirmed with alternative nucleic   acid target assay.          Chlamydia trachomatis, RNA, TMA Negative     Comment:  Performed By: [facility] [employer]  [address] [place] [id number]  : Francois Shell MD, MS         Aerobic culture [[id number]] Collected: 08/19/20 1802    Order Status: Completed Specimen: Joint Fluid from Knee, Right Updated: 08/22/20 0951     Aerobic Bacterial Culture No growth    Aerobic culture [[id number]] Collected: 08/19/20 1805    Order Status: Completed Specimen: Joint Fluid from Shoulder, Left Updated: 08/22/20 0951     Aerobic Bacterial Culture No growth    Aerobic culture [[id number]] Collected: 08/19/20 1804    Order Status: Completed Specimen: Joint Fluid from Ankle, Right Updated: 08/22/20 0951     Aerobic Bacterial Culture No growth    AFB Culture & Smear [[id number]] Collected: 08/19/20 1804    Order Status: Completed Specimen: Joint Fluid from Ankle, Right Updated: 08/20/20 2127     AFB Culture & Smear Culture in progress     AFB CULTURE STAIN No acid fast bacilli seen.    AFB Culture & Smear [[id number]] Collected: 08/19/20 1805    Order Status: Completed Specimen: Joint Fluid from Shoulder, Left Updated: 08/20/20 2127     AFB Culture & Smear Culture in progress     AFB CULTURE STAIN No acid fast bacilli seen.    AFB Culture & Smear [[id number]] Collected: 08/19/20 1802    Order Status: Completed Specimen: Joint Fluid from Knee, Right Updated: 08/20/20 2127     AFB Culture & Smear Culture in progress     AFB CULTURE STAIN No acid fast bacilli seen.    Gram stain [[id number]] Collected: 08/19/20 1802    Order Status: Completed Specimen: Joint Fluid from Knee, Right Updated: 08/19/20 1932     Gram Stain Result Rare WBC's      No organisms seen    Gram stain [[id number]] Collected: 08/19/20 1804    Order Status: Completed Specimen: Joint Fluid from Ankle, Right Updated: 08/19/20 1930     Gram Stain Result Rare WBC's      No organisms seen    Gram stain [[id number]] Collected: 08/19/20 1805    Order Status: Completed Specimen: Joint Fluid from Shoulder, Left Updated: 08/19/20  1929     Gram Stain Result No WBC's      No organisms seen    C. trachomatis/N. gonorrhoeae by AMP DNA [[id number]] Collected: 08/19/20 1140    Order Status: Canceled Specimen: Genital

## 2020-08-25 NOTE — PLAN OF CARE
Problem: Occupational Therapy Goal  Goal: Occupational Therapy Goal  Description: Goals to be met by: 9/4/2020     Patient will increase functional independence with ADLs by performing:    UE Dressing with Set-up Assistance. Met  LE Dressing with Stand-by Assistance.  Grooming while standing at sink with Modified [place]. Met simulating task  Toileting from toilet with Supervision for hygiene and clothing management. Met per patient report  Stand pivot transfers with Modified [place]. Met  Toilet transfer to toilet with Modified [place]. Met  Patient will be independent with B UE ROM exercise HEP.     8/25/2020 1606 by KRISTEL Terrazas  Outcome: Ongoing, Progressing   Patient's goals are appropriate.   KRISTEL Man  8/25/2020

## 2020-08-25 NOTE — PLAN OF CARE
"   08/25/20 0830   Post-Acute Status   Post-Acute Authorization Placement   Post-Acute Placement Status Referrals Sent   Discharge Delays (!) Patient and Family Barriers     Sw did speak with Landy at [facility] at , did fax all information to . Spoke with Ruben with [facility] at , "information is under review but we have gotten a lot of referrals and will call Sw back with decision."  "

## 2020-08-25 NOTE — SUBJECTIVE & OBJECTIVE
Interval History: NAEON. Working with CM on LTAC placement. Team spoke patient about stopping opioids to prepare to start Subox-one. Patient is not ready at this time.     Review of Systems   Constitutional: Negative for appetite change and fever.   HENT: Negative for congestion, sore throat and trouble swallowing.    Eyes: Negative for photophobia, pain and discharge.   Respiratory: Negative for cough and shortness of breath.    Cardiovascular: Negative for chest pain and palpitations.   Gastrointestinal: Negative for abdominal pain, diarrhea, nausea and vomiting.   Genitourinary: Negative for difficulty urinating.   Musculoskeletal: Positive for arthralgias and joint swelling. Negative for back pain, myalgias and neck pain.   Skin: Negative for pallor and rash.   Neurological: Negative for light-headedness, numbness and headaches.     Objective:     Vital Signs (Most Recent):  Temp: 98.4 °F (36.9 °C) (08/25/20 1222)  Pulse: 80 (08/25/20 1222)  Resp: 16 (08/25/20 1420)  BP: (!) 102/57 (08/25/20 1222)  SpO2: 96 % (08/25/20 1222) Vital Signs (24h Range):  Temp:  [97 °F (36.1 °C)-98.4 °F (36.9 °C)] 98.4 °F (36.9 °C)  Pulse:  [80-99] 80  Resp:  [16-19] 16  SpO2:  [96 %-98 %] 96 %  BP: ()/(57-65) 102/57     Weight: 63.5 kg (140 lb)  Body mass index is 21.29 kg/m².    Intake/Output Summary (Last 24 hours) at 8/25/2020 1502  Last data filed at 8/24/2020 2200  Gross per 24 hour   Intake --   Output 400 ml   Net -400 ml      Physical Exam  Constitutional:       Appearance: Normal appearance.   Eyes:      Conjunctiva/sclera: Conjunctivae normal.      Pupils: Pupils are equal, round, and reactive to light.   Cardiovascular:      Rate and Rhythm: Normal rate and regular rhythm.      Pulses: Normal pulses.      Heart sounds: Normal heart sounds.   Pulmonary:      Effort: Pulmonary effort is normal. No respiratory distress.      Breath sounds: Normal breath sounds.   Abdominal:      General: Bowel sounds are normal. There  is no distension.      Palpations: Abdomen is soft.      Tenderness: There is no abdominal tenderness.   Musculoskeletal:         General: Swelling and tenderness present.      Comments: Dressings in place to L shoulder, R knee, R ankle.    Skin:     General: Skin is warm.      Findings: No bruising or rash.   Neurological:      Mental Status: He is alert and oriented to person, place, and time.   Psychiatric:         Mood and Affect: Affect is angry.         Behavior: Behavior is agitated.         Significant Labs:   BMP:   Recent Labs   Lab 08/24/20  0341 08/25/20  0545   GLU 87 99    138   K 4.0 4.1    102   CO2 26 28   BUN 16 14   CREATININE 0.7 0.7   CALCIUM 8.9 9.7   MG 2.0  --      CBC:   Recent Labs   Lab 08/24/20  0341 08/25/20  0544   WBC 8.63 10.48   HGB 11.4* 12.1*   HCT 35.6* 37.0*   * 451*       Significant Imaging: I have reviewed all pertinent imaging results/findings within the past 24 hours.

## 2020-08-25 NOTE — ASSESSMENT & PLAN NOTE
Bebo Koroma is a 24 y.o. male IVDU presenting with fevers, left shoulder pain, right knee pain, right ankle pain s/p L shoulder arthroscopy, R knee arthroscopy and R ankle arthrotomy on 8/19/20. R knee and R ankle pain well controlled. L shoulder pain significant with ROM, improved slightly from yesterday. Continue iv abx per ID recs. No plans for operative intervention at this time.     -Trend ESR and CRP daily, trending down yesterday  -Follow cultures, ngtd  -PT/OT WBAT in all joints  -Abx, continue vanc/rocephin;  ID rec 4 weeks iv abx;   -DVT asa 81 daily per medicine team  -No plans for additional operative intervention at this time.      Dispo: ltac placement for iv abx

## 2020-08-25 NOTE — PLAN OF CARE
Sw did send more LTAC referrals in the [facility] and also in [facility] as pt informed staff he will stay in the area. Sw to follow.

## 2020-08-25 NOTE — PROGRESS NOTES
[facility] Hospital Medicine  Progress Note    Patient Name: Bebo Koroma  MRN: [id number]  Patient Class: IP- Inpatient   Admission Date: 8/19/2020  Length of Stay: 5 days  Attending Physician: Ronna Greene MD  Primary Care Provider: Primary Doctor [facility] Medicine Team: [facility] Daly Hale MD    Subjective:     Principal Problem:SIRS (systemic inflammatory response syndrome)        HPI:  Mr. Koroma is a 24 year old M with a PMHx of IV drug abuse and depression that presented to the ED complaining of left shoulder pain, right knee pain, and right ankle pain. Pain started when the patient woke up about 3 days ago, mostly left shoulder pain at that time. He then developed right ankle pain and right knee pain. He describes the pain as constant, sharp and worse with movement. Shoulder pain radiates down to the antecubital fossa region. He tried taking Tylenol with no relief. He has experienced significant decrease in ROM of left shoulder and states that he could not walk for 2 days due to his pain. Reports subjective fevers, chills, fatigue, general weakness, night sweats. Denies N/V, chest pain, SOB, abdominal pain, diarrhea, pain with urination. Patient reports that he last used IV opioids about 3 days ago. Injects into the left arm. On Subox-one at home but has not taken in 3 days. Denies alcohol use and other recreational drugs.     Pt arrived to ED with temp of 101.4, tachycardic elevated CRP and ESR. WBC and lactate wnl. Blood cultures done and 1 dose of vanc and zosyn given along with IVF. Xray L shoulder with no significant findings.     Overview/Hospital Course:  Pt admitted to IM 2 for further management of sepsis (source unknown at the time). Ortho consulted to r/o septic arthritis. Xray of right knee and ankle ordered. Psych consulted for management of addiction and medications (on Abilify, Prozac, and Subox-one at home). R knee tapped (31K WBC, 90% segs) R ankle  tapped (600 WBC, 43% seg), L shoulder tapped (clotted). Cultures pending, NGTD. Taken to the OR on 8/19 for arthroscopy of knee and shoulder and arthrotomy of ankle. 8/20 pt in pain, crying, sweating with chills. Subox-one unable to be restarted due to patient being on opioids. Adjusted pain medications for better pain control. Started opioid withdrawal protocol per psych recs. Restarting Abilify and Prozac per psych recs. Patient has had outburst with staff, stating his pain is not being controlled. Acute pain service consulted for further recs. Increased Prozac to 20 mg daily due to patient stating he takes 40 mg at home and his mood swings.     Interval History: ELIZABETHON. Working with CM on LTAC placement. Team spoke patient about stopping opioids to prepare to start Subox-one. Patient is not ready at this time.     Review of Systems   Constitutional: Negative for appetite change and fever.   HENT: Negative for congestion, sore throat and trouble swallowing.    Eyes: Negative for photophobia, pain and discharge.   Respiratory: Negative for cough and shortness of breath.    Cardiovascular: Negative for chest pain and palpitations.   Gastrointestinal: Negative for abdominal pain, diarrhea, nausea and vomiting.   Genitourinary: Negative for difficulty urinating.   Musculoskeletal: Positive for arthralgias and joint swelling. Negative for back pain, myalgias and neck pain.   Skin: Negative for pallor and rash.   Neurological: Negative for light-headedness, numbness and headaches.     Objective:     Vital Signs (Most Recent):  Temp: 98.4 °F (36.9 °C) (08/25/20 1222)  Pulse: 80 (08/25/20 1222)  Resp: 16 (08/25/20 1420)  BP: (!) 102/57 (08/25/20 1222)  SpO2: 96 % (08/25/20 1222) Vital Signs (24h Range):  Temp:  [97 °F (36.1 °C)-98.4 °F (36.9 °C)] 98.4 °F (36.9 °C)  Pulse:  [80-99] 80  Resp:  [16-19] 16  SpO2:  [96 %-98 %] 96 %  BP: ()/(57-65) 102/57     Weight: 63.5 kg (140 lb)  Body mass index is 21.29  kg/m².    Intake/Output Summary (Last 24 hours) at 8/25/2020 1502  Last data filed at 8/24/2020 2200  Gross per 24 hour   Intake --   Output 400 ml   Net -400 ml      Physical Exam  Constitutional:       Appearance: Normal appearance.   Eyes:      Conjunctiva/sclera: Conjunctivae normal.      Pupils: Pupils are equal, round, and reactive to light.   Cardiovascular:      Rate and Rhythm: Normal rate and regular rhythm.      Pulses: Normal pulses.      Heart sounds: Normal heart sounds.   Pulmonary:      Effort: Pulmonary effort is normal. No respiratory distress.      Breath sounds: Normal breath sounds.   Abdominal:      General: Bowel sounds are normal. There is no distension.      Palpations: Abdomen is soft.      Tenderness: There is no abdominal tenderness.   Musculoskeletal:         General: Swelling and tenderness present.      Comments: Dressings in place to L shoulder, R knee, R ankle.    Skin:     General: Skin is warm.      Findings: No bruising or rash.   Neurological:      Mental Status: He is alert and oriented to person, place, and time.   Psychiatric:         Mood and Affect: Affect is angry.         Behavior: Behavior is agitated.         Significant Labs:   BMP:   Recent Labs   Lab 08/24/20  0341 08/25/20  0545   GLU 87 99    138   K 4.0 4.1    102   CO2 26 28   BUN 16 14   CREATININE 0.7 0.7   CALCIUM 8.9 9.7   MG 2.0  --      CBC:   Recent Labs   Lab 08/24/20  0341 08/25/20  0544   WBC 8.63 10.48   HGB 11.4* 12.1*   HCT 35.6* 37.0*   * 451*       Significant Imaging: I have reviewed all pertinent imaging results/findings within the past 24 hours.      Assessment/Plan:      * SIRS (systemic inflammatory response syndrome)  24 year old M with a PMHx of IV drug abuse and depression presented to the ED complaining of left shoulder pain, right knee pain, and right ankle pain. On arrival temp 101.4, pulse 111, elevated CRP and Sed rate. WBC wnl. He has significant decreased ROM of  "left shoulder and swelling of right knee. Infectious workup started in the ED. Vanc and zosyn given in the ED.    Xray left shoulder, R knee, R ankle, CXR, and US right lower leg: no significant findings    POD 5: arthroscopy R knee and L shoulder, arthrotomy R ankle -  per ortho op-note "I think this is either a very early case of septic arthritis of multiple joints versus inflammatory poly arthritis. Given his febrile state and IV drug abuse I think he should be treated as though the septic arthritis even if his cultures fail to grow anything."    Plan:   -- Ortho consulted for septic arthritis r/o. Follwing recs  -- Aspiration of joints: R knee (31K WBC, 90% segs) R ankle (600 WBC, 43% segs), L shoulder clotted  -- Fluid aspiration gram stain pending  -- Continue Rocephin and Vanc.  -- ID consulted. Following recs.  -- Blood cultures NGTD  -- HIV negative  -- Hepatitis C antibody positive so will need follow up with Infectious Disease in [facility] as outpatient  -- Procalcitonin 0.67  -- Trend ESR and CRP daily  -- Working on placement for continued antibiotics.         Oligoarthritis of multiple sites - L shoulder, R knee, R ankle  IV drug user presented to the ED complaining of left shoulder pain, right knee pain, and right ankle pain. On arrival temp 101.4, pulse 111, elevated CRP and Sed rate. WBC wnl. He has significant decreased ROM of left shoulder and swelling of right knee. He states his pain has been severe, leading him unable to walk for 2 days.    Xray left shoulder, R knee, R ankle, CXR, and US right lower leg: no significant findings    Plan:   -- Ortho consulted. Following recs. See SIRS  -- Colchicine and Indomethacin d/eduardo  -- Oxycodone 10 mg Q4H PRN  -- Dilaudid 2 mg Q4H PRN  -- Scheduled Tylenol 650 mg TID      IVDU (intravenous drug user)  Pt reports use of IV drugs since the age of 17. States he has consistently used them for 5 years and became sober 6 months ago. He is on Subox-one at " home which he last took 3 days ago. He relapsed last week. Last IV opioid use was 3 days ago. Patient has a 1 week old  at home that could be contributing to the relapse. On exam he was tremulous with mildly dilated pupils. He reports recent sweating and cold chills. He denies N/V diarrhea. With his chronic use, he will likely be hyper-analgesic.     Plan:  -- Clinical Opiate Withdrawal Scale: 4  -- Ativan 0.5 mg PRN  -- Psych consulted for addiction management. Following recs. See opioid dependence.  -- Continue to monitor for signs of withdrawl      Opioid use disorder, severe, dependence  Pt reports IV heroin use since the age of 17. He has consistantly used for the past 5 years. He states he was recently 6 months sober but relapsed 1 week ago. He would like to resume his Subox-one while inpatient, however he must be off opioids for > 20 hours per psych.    Plan:   -- Addiction Psychiatry consulted and following  -- Pain Management Service also consulted for assisstance  -- Clonidine 0.1mg PO q4hr PRN for withdrawal associated HTN  -- Bentyl 10mg PO q6hr PRN for abdominal muscle cramps  -- Loperamide 2mg PO q6hr PRN for diarrhea  -- Robaxin 500mg PO q6hr PRN for muscle spasm  -- Zofran 4mg ODT q8hr PRN for nausea  -- Vistaril 50mg PO qHS PRN for insomnia  -- Tylenol 650 mg TID  -- Elavil 25 mg QHS  -- Abilify 5 mg QD  -- Prozac 20 mg QD  -- Gabapentin 300 mg BID  -- Ibuprofen 800 mg TID with Protonix 40 mg daily    Anxiety and depression  Pt reports hx of anxiety/depression on Abilify and Prozac at home. He states he takes Prozac 40 mg daily and Abilify 20 mg daily which is different than how is documented in the chart. Patient is having mood swings with outbursts due to his pain not being controlled. Some of this may be due to his underlying psych history.     Plan:  -- Psych consulted for medication recs. Following  -- Abilify 5 mg daily  -- Increased Prozac to 20 mg daily to see if it helps with his  mood.    Opioid withdrawal  See Opioid use disorder, severe, dependence      Septic arthritis of right ankle  See SIRS (systemic inflammatory response syndrome)      Septic arthritis of shoulder, left  See SIRS (systemic inflammatory response syndrome)      Septic arthritis of knee, right  See SIRS (systemic inflammatory response syndrome)      Elevated C-reactive protein (CRP)  See SIRS (systemic inflammatory response syndrome)      Elevated erythrocyte sedimentation rate  See SIRS (systemic inflammatory response syndrome)        VTE Risk Mitigation (From admission, onward)         Ordered     Place sequential compression device  Until discontinued      08/19/20 1643     IP VTE LOW RISK PATIENT  Once      08/19/20 1643                Discharge Planning   TITA: 8/26/2020     Code Status: Full Code   Is the patient medically ready for discharge?: Yes    Reason for patient still in hospital (select all that apply): Treatment  Discharge Plan A: Long-term acute care facility (LTAC)   Discharge Delays: (!) Patient and Family Barriers              Daly Hale MD  Department of Hospital Medicine   [facility]

## 2020-08-25 NOTE — PROGRESS NOTES
[facility]  Orthopedics  Progress Note    Patient Name: Bebo Koroma  MRN: [id number]  Admission Date: 8/19/2020  Hospital Length of Stay: 5 days  Attending Provider: Ronna Greene MD  Primary Care Provider: Primary Doctor No  Follow-up For: Procedure(s) (LRB):  ARTHROSCOPY, KNEE - cultures & 9 liters saline - arthroscopic leg lozada  (Right)  ARTHROSCOPY, SHOULDER - cultures & 9 liters saline (Left)  ARTHROTOMY, ANKLE - cultures & 9 liters of saline - cysto tubing (Right)    Post-Operative Day: 6 Days Post-Op  Subjective:     Principal Problem:SIRS (systemic inflammatory response syndrome)    Principal Orthopedic Problem: s/p L shoulder arthroscopy, R knee arthroscopy and R ankle arthrotomy on 8/19/20    Interval History: The patient was seen and examined at the bedside. NAEON. Tolerating PO intake. VSS. Denies R knee and R ankle pain. L shoulder pain improving today. CRP trending down to 63 from 100.4. WBC stable.       Review of patient's allergies indicates:   Allergen Reactions    Penicillins Hives     Unclear of last episode of allergic reaction  Tolerated ceftriaxone 08/2020       Current Facility-Administered Medications   Medication    acetaminophen tablet 650 mg    amitriptyline tablet 25 mg    ARIPiprazole tablet 5 mg    aspirin EC tablet 81 mg    cefTRIAXone (ROCEPHIN) 2 g/50 mL D5W IVPB    cloNIDine tablet 0.1 mg    dextrose 50% injection 12.5 g    dextrose 50% injection 25 g    dicyclomine capsule 10 mg    FLUoxetine capsule 20 mg    gabapentin capsule 300 mg    glucagon (human recombinant) injection 1 mg    glucose chewable tablet 16 g    glucose chewable tablet 24 g    HYDROmorphone injection 2 mg    hydrOXYzine pamoate capsule 50 mg    ibuprofen tablet 800 mg    loperamide capsule 2 mg    LORazepam tablet 0.5 mg    melatonin tablet 6 mg    methocarbamoL tablet 750 mg    naloxone 0.4 mg/mL injection 0.4 mg    nicotine 21 mg/24 hr 1 patch    ondansetron  "disintegrating tablet 8 mg    oxyCODONE immediate release tablet Tab 10 mg    pantoprazole EC tablet 40 mg    prochlorperazine tablet 10 mg    senna-docusate 8.6-50 mg per tablet 1 tablet    sodium chloride 0.9% flush 10 mL    vancomycin 1.5 g in dextrose 5 % 250 mL IVPB (ready to mix)     Objective:     Vital Signs (Most Recent):  Temp: 98.4 °F (36.9 °C) (08/24/20 0910)  Pulse: 98 (08/24/20 0910)  Resp: 16 (08/24/20 0912)  BP: 111/60 (08/24/20 0910)  SpO2: 99 % (08/24/20 0910) Vital Signs (24h Range):  Temp:  [97.6 °F (36.4 °C)-98.6 °F (37 °C)] 98.4 °F (36.9 °C)  Pulse:  [70-99] 98  Resp:  [16-19] 16  SpO2:  [96 %-100 %] 99 %  BP: (102-112)/(57-62) 111/60     Weight: 63.5 kg (140 lb)  Height: 5' 8" (172.7 cm)  Body mass index is 21.29 kg/m².      Intake/Output Summary (Last 24 hours) at 8/24/2020 1110  Last data filed at 8/23/2020 2039  Gross per 24 hour   Intake 240 ml   Output 300 ml   Net -60 ml       Ortho/SPM Exam     LUE  Dressings c,d,i  Pain with shoulder ROM improving  SILT M/U/R  Motor intact AIN/PIN/M/U/R   Cap refill < 2s  2+ RP    RLE:  Dressings to ankle and knee c,d,i  Soreness with ankle and knee passive  SILT Sa/Bell/DP/SP/T  Motor intact EHL/FHL/TA/Gastroc  2+ DP, 2+ PT          Significant Labs:   CBC:   Recent Labs   Lab 08/23/20 0439 08/24/20 0341   WBC 8.25 8.63   HGB 11.4* 11.4*   HCT 35.7* 35.6*    364*     CMP:   Recent Labs   Lab 08/23/20 0439 08/24/20 0341    138   K 3.9 4.0    103   CO2 23 26    87   BUN 11 16   CREATININE 0.6 0.7   CALCIUM 9.0 8.9   PROT 7.1 7.1   ALBUMIN 2.8* 2.7*   BILITOT 0.3 0.3   ALKPHOS 68 64   AST 10 15   ALT 10 10   ANIONGAP 10 9   EGFRNONAA >60.0 >60.0     CRP:   Recent Labs   Lab 08/23/20  1221 08/24/20  0341   .6* 100.4*     All pertinent labs within the past 24 hours have been reviewed.    Significant Imaging: I have reviewed all pertinent imaging results/findings.    Assessment/Plan:     Septic arthritis of knee, " right Bebo Koroma is a 24 y.o. male IVDU presenting with fevers, left shoulder pain, right knee pain, right ankle pain s/p L shoulder arthroscopy, R knee arthroscopy and R ankle arthrotomy on 8/19/20. R knee and R ankle pain well controlled. L shoulder pain significant with ROM, improved slightly from yesterday. Continue iv abx per ID recs. No plans for operative intervention at this time.     -Trend ESR and CRP daily, trending down yesterday  -Follow cultures, ngtd  -PT/OT WBAT in all joints  -Abx, continue vanc/rocephin;  ID rec 4 weeks iv abx;   -DVT asa 81 daily per medicine team  -No plans for additional operative intervention at this time.      Dispo: ltac placement for iv abx          Tian Bucio MD  Orthopedics  [facility]

## 2020-08-26 LAB
ALBUMIN SERPL BCP-MCNC: 3 G/DL (ref 3.5–5.2)
ALP SERPL-CCNC: 59 U/L (ref 55–135)
ALT SERPL W/O P-5'-P-CCNC: 10 U/L (ref 10–44)
ANION GAP SERPL CALC-SCNC: 8 MMOL/L (ref 8–16)
AST SERPL-CCNC: 10 U/L (ref 10–40)
BASOPHILS # BLD AUTO: 0.04 K/UL (ref 0–0.2)
BASOPHILS NFR BLD: 0.5 % (ref 0–1.9)
BILIRUB SERPL-MCNC: 0.3 MG/DL (ref 0.1–1)
BUN SERPL-MCNC: 20 MG/DL (ref 6–20)
CALCIUM SERPL-MCNC: 9 MG/DL (ref 8.7–10.5)
CHLORIDE SERPL-SCNC: 102 MMOL/L (ref 95–110)
CO2 SERPL-SCNC: 28 MMOL/L (ref 23–29)
CREAT SERPL-MCNC: 0.7 MG/DL (ref 0.5–1.4)
CRP SERPL-MCNC: 46.1 MG/L (ref 0–8.2)
DIFFERENTIAL METHOD: ABNORMAL
EOSINOPHIL # BLD AUTO: 0.4 K/UL (ref 0–0.5)
EOSINOPHIL NFR BLD: 4.8 % (ref 0–8)
ERYTHROCYTE [DISTWIDTH] IN BLOOD BY AUTOMATED COUNT: 12.8 % (ref 11.5–14.5)
ERYTHROCYTE [SEDIMENTATION RATE] IN BLOOD BY WESTERGREN METHOD: 65 MM/HR (ref 0–23)
EST. GFR  (AFRICAN AMERICAN): >60 ML/MIN/1.73 M^2
EST. GFR  (NON AFRICAN AMERICAN): >60 ML/MIN/1.73 M^2
GLUCOSE SERPL-MCNC: 86 MG/DL (ref 70–110)
HCT VFR BLD AUTO: 34.7 % (ref 40–54)
HGB BLD-MCNC: 10.9 G/DL (ref 14–18)
IMM GRANULOCYTES # BLD AUTO: 0.06 K/UL (ref 0–0.04)
IMM GRANULOCYTES NFR BLD AUTO: 0.8 % (ref 0–0.5)
LYMPHOCYTES # BLD AUTO: 2.6 K/UL (ref 1–4.8)
LYMPHOCYTES NFR BLD: 34.3 % (ref 18–48)
MCH RBC QN AUTO: 29.9 PG (ref 27–31)
MCHC RBC AUTO-ENTMCNC: 31.4 G/DL (ref 32–36)
MCV RBC AUTO: 95 FL (ref 82–98)
MONOCYTES # BLD AUTO: 0.8 K/UL (ref 0.3–1)
MONOCYTES NFR BLD: 11.3 % (ref 4–15)
NEUTROPHILS # BLD AUTO: 3.6 K/UL (ref 1.8–7.7)
NEUTROPHILS NFR BLD: 48.3 % (ref 38–73)
NRBC BLD-RTO: 0 /100 WBC
PLATELET # BLD AUTO: 410 K/UL (ref 150–350)
PMV BLD AUTO: 10 FL (ref 9.2–12.9)
POTASSIUM SERPL-SCNC: 4.2 MMOL/L (ref 3.5–5.1)
PROT SERPL-MCNC: 7.3 G/DL (ref 6–8.4)
RBC # BLD AUTO: 3.64 M/UL (ref 4.6–6.2)
SODIUM SERPL-SCNC: 138 MMOL/L (ref 136–145)
WBC # BLD AUTO: 7.43 K/UL (ref 3.9–12.7)

## 2020-08-26 PROCEDURE — 80053 COMPREHEN METABOLIC PANEL: CPT

## 2020-08-26 PROCEDURE — 86140 C-REACTIVE PROTEIN: CPT

## 2020-08-26 PROCEDURE — 99232 PR SUBSEQUENT HOSPITAL CARE,LEVL II: ICD-10-PCS | Mod: ,,, | Performed by: INTERNAL MEDICINE

## 2020-08-26 PROCEDURE — 85025 COMPLETE CBC W/AUTO DIFF WBC: CPT

## 2020-08-26 PROCEDURE — 99232 SBSQ HOSP IP/OBS MODERATE 35: CPT | Mod: ,,, | Performed by: INTERNAL MEDICINE

## 2020-08-26 PROCEDURE — 85652 RBC SED RATE AUTOMATED: CPT

## 2020-08-26 PROCEDURE — 11000001 HC ACUTE MED/SURG PRIVATE ROOM

## 2020-08-26 PROCEDURE — 36415 COLL VENOUS BLD VENIPUNCTURE: CPT

## 2020-08-26 PROCEDURE — 25000003 PHARM REV CODE 250: Performed by: STUDENT IN AN ORGANIZED HEALTH CARE EDUCATION/TRAINING PROGRAM

## 2020-08-26 PROCEDURE — 25000003 PHARM REV CODE 250: Performed by: INTERNAL MEDICINE

## 2020-08-26 PROCEDURE — 63600175 PHARM REV CODE 636 W HCPCS: Performed by: STUDENT IN AN ORGANIZED HEALTH CARE EDUCATION/TRAINING PROGRAM

## 2020-08-26 PROCEDURE — 25000003 PHARM REV CODE 250: Performed by: ORTHOPAEDIC SURGERY

## 2020-08-26 PROCEDURE — 94761 N-INVAS EAR/PLS OXIMETRY MLT: CPT

## 2020-08-26 PROCEDURE — 63600175 PHARM REV CODE 636 W HCPCS: Performed by: INTERNAL MEDICINE

## 2020-08-26 RX ORDER — OXYCODONE HYDROCHLORIDE 10 MG/1
10 TABLET ORAL EVERY 6 HOURS PRN
Qty: 20 TABLET | Refills: 0 | Status: SHIPPED | OUTPATIENT
Start: 2020-08-26 | End: 2020-08-26

## 2020-08-26 RX ORDER — FLUOXETINE HYDROCHLORIDE 20 MG/1
20 CAPSULE ORAL DAILY
Qty: 30 CAPSULE | Refills: 11
Start: 2020-08-26 | End: 2021-08-26

## 2020-08-26 RX ORDER — PANTOPRAZOLE SODIUM 40 MG/1
40 TABLET, DELAYED RELEASE ORAL DAILY
Qty: 30 TABLET | Refills: 11
Start: 2020-08-27 | End: 2021-08-27

## 2020-08-26 RX ORDER — AMITRIPTYLINE HYDROCHLORIDE 25 MG/1
25 TABLET, FILM COATED ORAL NIGHTLY
Qty: 30 TABLET | Refills: 11
Start: 2020-08-26 | End: 2021-08-26

## 2020-08-26 RX ORDER — GABAPENTIN 300 MG/1
300 CAPSULE ORAL 2 TIMES DAILY
Qty: 60 CAPSULE | Refills: 11
Start: 2020-08-26 | End: 2021-08-26

## 2020-08-26 RX ORDER — DICYCLOMINE HYDROCHLORIDE 10 MG/1
10 CAPSULE ORAL EVERY 6 HOURS PRN
Qty: 120 CAPSULE | Refills: 0
Start: 2020-08-26 | End: 2020-09-25

## 2020-08-26 RX ORDER — ONDANSETRON 8 MG/1
8 TABLET, ORALLY DISINTEGRATING ORAL EVERY 8 HOURS PRN
Qty: 6 TABLET
Start: 2020-08-26

## 2020-08-26 RX ORDER — HYDROXYZINE PAMOATE 50 MG/1
50 CAPSULE ORAL NIGHTLY PRN
Qty: 30 CAPSULE | Refills: 0
Start: 2020-08-26 | End: 2020-09-25

## 2020-08-26 RX ORDER — IBUPROFEN 800 MG/1
800 TABLET ORAL 3 TIMES DAILY
Start: 2020-08-26

## 2020-08-26 RX ORDER — METHOCARBAMOL 750 MG/1
750 TABLET, FILM COATED ORAL EVERY 6 HOURS
Qty: 40 TABLET | Refills: 0
Start: 2020-08-26 | End: 2020-09-05

## 2020-08-26 RX ORDER — OXYCODONE HYDROCHLORIDE 10 MG/1
10 TABLET ORAL EVERY 6 HOURS PRN
Qty: 20 TABLET | Refills: 0 | Status: SHIPPED | OUTPATIENT
Start: 2020-08-26 | End: 2020-08-31

## 2020-08-26 RX ORDER — ACETAMINOPHEN 325 MG/1
650 TABLET ORAL 3 TIMES DAILY
Refills: 0
Start: 2020-08-26

## 2020-08-26 RX ORDER — LOPERAMIDE HYDROCHLORIDE 2 MG/1
2 CAPSULE ORAL EVERY 6 HOURS PRN
Qty: 40 CAPSULE | Refills: 0
Start: 2020-08-26 | End: 2020-09-05

## 2020-08-26 RX ADMIN — OXYCODONE HYDROCHLORIDE 10 MG: 10 TABLET ORAL at 09:08

## 2020-08-26 RX ADMIN — ACETAMINOPHEN 650 MG: 325 TABLET ORAL at 08:08

## 2020-08-26 RX ADMIN — METHOCARBAMOL TABLETS 750 MG: 750 TABLET, COATED ORAL at 12:08

## 2020-08-26 RX ADMIN — HYDROXYZINE PAMOATE 50 MG: 25 CAPSULE ORAL at 08:08

## 2020-08-26 RX ADMIN — METHOCARBAMOL TABLETS 750 MG: 750 TABLET, COATED ORAL at 07:08

## 2020-08-26 RX ADMIN — ACETAMINOPHEN 650 MG: 325 TABLET ORAL at 09:08

## 2020-08-26 RX ADMIN — IBUPROFEN 800 MG: 400 TABLET, FILM COATED ORAL at 08:08

## 2020-08-26 RX ADMIN — FLUOXETINE 20 MG: 20 CAPSULE ORAL at 09:08

## 2020-08-26 RX ADMIN — GABAPENTIN 300 MG: 300 CAPSULE ORAL at 08:08

## 2020-08-26 RX ADMIN — VANCOMYCIN HYDROCHLORIDE 1500 MG: 1.5 INJECTION, POWDER, LYOPHILIZED, FOR SOLUTION INTRAVENOUS at 05:08

## 2020-08-26 RX ADMIN — OXYCODONE HYDROCHLORIDE 10 MG: 10 TABLET ORAL at 05:08

## 2020-08-26 RX ADMIN — ACETAMINOPHEN 650 MG: 325 TABLET ORAL at 02:08

## 2020-08-26 RX ADMIN — PANTOPRAZOLE SODIUM 40 MG: 40 TABLET, DELAYED RELEASE ORAL at 09:08

## 2020-08-26 RX ADMIN — ASPIRIN 81 MG: 81 TABLET, COATED ORAL at 09:08

## 2020-08-26 RX ADMIN — HYDROMORPHONE HYDROCHLORIDE 2 MG: 1 INJECTION, SOLUTION INTRAMUSCULAR; INTRAVENOUS; SUBCUTANEOUS at 08:08

## 2020-08-26 RX ADMIN — HYDROMORPHONE HYDROCHLORIDE 2 MG: 1 INJECTION, SOLUTION INTRAMUSCULAR; INTRAVENOUS; SUBCUTANEOUS at 03:08

## 2020-08-26 RX ADMIN — VANCOMYCIN HYDROCHLORIDE 1500 MG: 1.5 INJECTION, POWDER, LYOPHILIZED, FOR SOLUTION INTRAVENOUS at 07:08

## 2020-08-26 RX ADMIN — HYDROMORPHONE HYDROCHLORIDE 2 MG: 1 INJECTION, SOLUTION INTRAMUSCULAR; INTRAVENOUS; SUBCUTANEOUS at 07:08

## 2020-08-26 RX ADMIN — AMITRIPTYLINE HYDROCHLORIDE 25 MG: 25 TABLET, FILM COATED ORAL at 08:08

## 2020-08-26 RX ADMIN — ARIPIPRAZOLE 5 MG: 5 TABLET ORAL at 09:08

## 2020-08-26 RX ADMIN — GABAPENTIN 300 MG: 300 CAPSULE ORAL at 09:08

## 2020-08-26 RX ADMIN — IBUPROFEN 800 MG: 400 TABLET, FILM COATED ORAL at 09:08

## 2020-08-26 RX ADMIN — CEFTRIAXONE 2 G: 2 INJECTION, SOLUTION INTRAVENOUS at 09:08

## 2020-08-26 RX ADMIN — HYDROMORPHONE HYDROCHLORIDE 2 MG: 1 INJECTION, SOLUTION INTRAMUSCULAR; INTRAVENOUS; SUBCUTANEOUS at 11:08

## 2020-08-26 RX ADMIN — METHOCARBAMOL TABLETS 750 MG: 750 TABLET, COATED ORAL at 05:08

## 2020-08-26 RX ADMIN — IBUPROFEN 800 MG: 400 TABLET, FILM COATED ORAL at 02:08

## 2020-08-26 NOTE — CONSULTS
Re-timing vancomycin trough to 0800 on 8/27; goal 15-20 mcg/mL.    Wil Anguiano Pharm.D., BCPS  [id number]

## 2020-08-26 NOTE — PROGRESS NOTES
[facility]  Orthopedics  Progress Note    Patient Name: Bebo Koroma  MRN: [id number]  Admission Date: 8/19/2020  Hospital Length of Stay: 6 days  Attending Provider: Ronna Greene MD  Primary Care Provider: Primary Doctor No  Follow-up For: Procedure(s) (LRB):  ARTHROSCOPY, KNEE - cultures & 9 liters saline - arthroscopic leg lozada  (Right)  ARTHROSCOPY, SHOULDER - cultures & 9 liters saline (Left)  ARTHROTOMY, ANKLE - cultures & 9 liters of saline - cysto tubing (Right)    Post-Operative Day: 7 Days Post-Op  Subjective:     Principal Problem:SIRS (systemic inflammatory response syndrome)    Principal Orthopedic Problem: s/p L shoulder arthroscopy, R knee arthroscopy and R ankle arthrotomy on 8/19/20    Interval History: The patient was seen and examined at the bedside. NAEON. Tolerating PO intake. VSS. Denies R knee and R ankle pain. L shoulder pain improving further today. CRP trending down to 46 from 63. WBC stable.       Review of patient's allergies indicates:   Allergen Reactions    Penicillins Hives     Unclear of last episode of allergic reaction  Tolerated ceftriaxone 08/2020       Current Facility-Administered Medications   Medication    acetaminophen tablet 650 mg    amitriptyline tablet 25 mg    ARIPiprazole tablet 5 mg    aspirin EC tablet 81 mg    cefTRIAXone (ROCEPHIN) 2 g/50 mL D5W IVPB    cloNIDine tablet 0.1 mg    dextrose 50% injection 12.5 g    dextrose 50% injection 25 g    dicyclomine capsule 10 mg    FLUoxetine capsule 20 mg    gabapentin capsule 300 mg    glucagon (human recombinant) injection 1 mg    glucose chewable tablet 16 g    glucose chewable tablet 24 g    HYDROmorphone injection 2 mg    hydrOXYzine pamoate capsule 50 mg    ibuprofen tablet 800 mg    loperamide capsule 2 mg    LORazepam tablet 0.5 mg    melatonin tablet 6 mg    methocarbamoL tablet 750 mg    naloxone 0.4 mg/mL injection 0.4 mg    nicotine 21 mg/24 hr 1 patch     "ondansetron disintegrating tablet 8 mg    oxyCODONE immediate release tablet Tab 10 mg    pantoprazole EC tablet 40 mg    prochlorperazine tablet 10 mg    senna-docusate 8.6-50 mg per tablet 1 tablet    sodium chloride 0.9% flush 10 mL    vancomycin 1.5 g in dextrose 5 % 250 mL IVPB (ready to mix)     Objective:     Vital Signs (Most Recent):  Temp: 97.1 °F (36.2 °C) (08/26/20 1148)  Pulse: 78 (08/26/20 1148)  Resp: 18 (08/26/20 1148)  BP: (!) 98/53 (08/26/20 1148)  SpO2: (!) 94 % (08/26/20 1148) Vital Signs (24h Range):  Temp:  [97 °F (36.1 °C)-98.2 °F (36.8 °C)] 97.1 °F (36.2 °C)  Pulse:  [76-91] 78  Resp:  [16-20] 18  SpO2:  [94 %-100 %] 94 %  BP: ()/(53-72) 98/53     Weight: 63.5 kg (140 lb)  Height: 5' 8" (172.7 cm)  Body mass index is 21.29 kg/m².    No intake or output data in the 24 hours ending 08/26/20 1415    Ortho/SPM Exam     LUE  Dressings c,d,i  Pain with shoulder ROM improving  SILT M/U/R  Motor intact AIN/PIN/M/U/R   Cap refill < 2s  2+ RP    RLE:  Dressings to ankle and knee c,d,i  Soreness with ankle and knee passive  SILT Sa/Bell/DP/SP/T  Motor intact EHL/FHL/TA/Gastroc  2+ DP, 2+ PT          Significant Labs:   CBC:   Recent Labs   Lab 08/25/20  0544 08/26/20  0303   WBC 10.48 7.43   HGB 12.1* 10.9*   HCT 37.0* 34.7*   * 410*     CMP:   Recent Labs   Lab 08/25/20  0545 08/26/20  0303    138   K 4.1 4.2    102   CO2 28 28   GLU 99 86   BUN 14 20   CREATININE 0.7 0.7   CALCIUM 9.7 9.0   PROT 8.1 7.3   ALBUMIN 3.2* 3.0*   BILITOT 0.4 0.3   ALKPHOS 68 59   AST 12 10   ALT 11 10   ANIONGAP 8 8   EGFRNONAA >60.0 >60.0     CRP:   Recent Labs   Lab 08/25/20  0545 08/26/20  0303   CRP 63.0* 46.1*     All pertinent labs within the past 24 hours have been reviewed.    Significant Imaging: I have reviewed all pertinent imaging results/findings.    Assessment/Plan:     Septic arthritis of knee, right  Bebo Koroma is a 24 y.o. male IVDU presenting with fevers, left shoulder " pain, right knee pain, right ankle pain s/p L shoulder arthroscopy, R knee arthroscopy and R ankle arthrotomy on 8/19/20. R knee and R ankle pain well controlled. L shoulder pain significant with ROM, improved slightly from yesterday. Continue iv abx per ID recs. No plans for operative intervention at this time.     -Trend ESR and CRP daily, trending down yesterday  -Follow cultures, ngtd  -PT/OT WBAT in all joints  -Abx, continue vanc/rocephin;  ID rec 4 weeks iv abx;   -DVT asa 81 daily per medicine team  -No plans for additional operative intervention at this time.      Dispo: ltac placement for iv abx          Tian Bucio MD  Orthopedics  [facility]

## 2020-08-26 NOTE — SUBJECTIVE & OBJECTIVE
Principal Problem:SIRS (systemic inflammatory response syndrome)    Principal Orthopedic Problem: s/p L shoulder arthroscopy, R knee arthroscopy and R ankle arthrotomy on 8/19/20    Interval History: The patient was seen and examined at the bedside. NAEON. Tolerating PO intake. VSS. Denies R knee and R ankle pain. L shoulder pain improving further today. CRP trending down to 46 from 63. WBC stable.       Review of patient's allergies indicates:   Allergen Reactions    Penicillins Hives     Unclear of last episode of allergic reaction  Tolerated ceftriaxone 08/2020       Current Facility-Administered Medications   Medication    acetaminophen tablet 650 mg    amitriptyline tablet 25 mg    ARIPiprazole tablet 5 mg    aspirin EC tablet 81 mg    cefTRIAXone (ROCEPHIN) 2 g/50 mL D5W IVPB    cloNIDine tablet 0.1 mg    dextrose 50% injection 12.5 g    dextrose 50% injection 25 g    dicyclomine capsule 10 mg    FLUoxetine capsule 20 mg    gabapentin capsule 300 mg    glucagon (human recombinant) injection 1 mg    glucose chewable tablet 16 g    glucose chewable tablet 24 g    HYDROmorphone injection 2 mg    hydrOXYzine pamoate capsule 50 mg    ibuprofen tablet 800 mg    loperamide capsule 2 mg    LORazepam tablet 0.5 mg    melatonin tablet 6 mg    methocarbamoL tablet 750 mg    naloxone 0.4 mg/mL injection 0.4 mg    nicotine 21 mg/24 hr 1 patch    ondansetron disintegrating tablet 8 mg    oxyCODONE immediate release tablet Tab 10 mg    pantoprazole EC tablet 40 mg    prochlorperazine tablet 10 mg    senna-docusate 8.6-50 mg per tablet 1 tablet    sodium chloride 0.9% flush 10 mL    vancomycin 1.5 g in dextrose 5 % 250 mL IVPB (ready to mix)     Objective:     Vital Signs (Most Recent):  Temp: 97.1 °F (36.2 °C) (08/26/20 1148)  Pulse: 78 (08/26/20 1148)  Resp: 18 (08/26/20 1148)  BP: (!) 98/53 (08/26/20 1148)  SpO2: (!) 94 % (08/26/20 1148) Vital Signs (24h Range):  Temp:  [97 °F (36.1 °C)-98.2  "°F (36.8 °C)] 97.1 °F (36.2 °C)  Pulse:  [76-91] 78  Resp:  [16-20] 18  SpO2:  [94 %-100 %] 94 %  BP: ()/(53-72) 98/53     Weight: 63.5 kg (140 lb)  Height: 5' 8" (172.7 cm)  Body mass index is 21.29 kg/m².    No intake or output data in the 24 hours ending 08/26/20 1415    Ortho/SPM Exam     LUE  Dressings c,d,i  Pain with shoulder ROM improving  SILT M/U/R  Motor intact AIN/PIN/M/U/R   Cap refill < 2s  2+ RP    RLE:  Dressings to ankle and knee c,d,i  Soreness with ankle and knee passive  SILT Sa/Bell/DP/SP/T  Motor intact EHL/FHL/TA/Gastroc  2+ DP, 2+ PT          Significant Labs:   CBC:   Recent Labs   Lab 08/25/20  0544 08/26/20  0303   WBC 10.48 7.43   HGB 12.1* 10.9*   HCT 37.0* 34.7*   * 410*     CMP:   Recent Labs   Lab 08/25/20  0545 08/26/20  0303    138   K 4.1 4.2    102   CO2 28 28   GLU 99 86   BUN 14 20   CREATININE 0.7 0.7   CALCIUM 9.7 9.0   PROT 8.1 7.3   ALBUMIN 3.2* 3.0*   BILITOT 0.4 0.3   ALKPHOS 68 59   AST 12 10   ALT 11 10   ANIONGAP 8 8   EGFRNONAA >60.0 >60.0     CRP:   Recent Labs   Lab 08/25/20  0545 08/26/20  0303   CRP 63.0* 46.1*     All pertinent labs within the past 24 hours have been reviewed.    Significant Imaging: I have reviewed all pertinent imaging results/findings.  "

## 2020-08-26 NOTE — PLAN OF CARE
Pt AAOx4, VSS, complaints of pain managed with PRN and scheduled medication per orders. I/O maintained per orders. Pt has Vanc trough due 8/27. IV antibiotics given per orders. Pt to be D/C to Ltac.   Problem: Fall Injury Risk  Goal: Absence of Fall and Fall-Related Injury  Outcome: Ongoing, Progressing  Intervention: Identify and Manage Contributors to Fall Injury Risk  Flowsheets (Taken 8/26/2020 1852)  Self-Care Promotion:   independence encouraged   BADL personal objects within reach   BADL personal routines maintained   safe use of adaptive equipment encouraged   meal setup provided  Medication Review/Management:   medications reviewed   high risk medications identified  Intervention: Promote Injury-Free Environment  Flowsheets (Taken 8/26/2020 1852)  Safety Promotion/Fall Prevention:   side rails raised x 2   assistive device/personal item within reach   bed alarm set   commode/urinal/bedpan at bedside   diversional activities provided   Fall Risk signage in place   Fall Risk reviewed with patient/family   high risk medications identified   lighting adjusted   nonskid shoes/socks when out of bed   medications reviewed   instructed to call staff for mobility  Environmental Safety Modification:   assistive device/personal items within reach   clutter free environment maintained   lighting adjusted   room organization consistent     Problem: Adult Inpatient Plan of Care  Goal: Plan of Care Review  Outcome: Ongoing, Progressing  Flowsheets (Taken 8/26/2020 1852)  Plan of Care Reviewed With: patient  Goal: Patient-Specific Goal (Individualization)  Outcome: Ongoing, Progressing  Goal: Absence of Hospital-Acquired Illness or Injury  Outcome: Ongoing, Progressing  Intervention: Identify and Manage Fall Risk  Flowsheets (Taken 8/26/2020 1852)  Safety Promotion/Fall Prevention:   side rails raised x 2   assistive device/personal item within reach   bed alarm set   commode/urinal/bedpan at bedside   diversional  activities provided   Fall Risk signage in place   Fall Risk reviewed with patient/family   high risk medications identified   lighting adjusted   nonskid shoes/socks when out of bed   medications reviewed   instructed to call staff for mobility  Intervention: Prevent VTE (venous thromboembolism)  Flowsheets (Taken 8/26/2020 1852)  VTE Prevention/Management:   ambulation promoted   bleeding risk assessed   bleeding risk factor(s) identified, provider notified   bleeding precautions maintained   fluids promoted  Goal: Optimal Comfort and Wellbeing  Outcome: Ongoing, Progressing  Intervention: Provide Person-Centered Care  Flowsheets (Taken 8/26/2020 1852)  Trust Relationship/Rapport:   care explained   choices provided   emotional support provided   empathic listening provided   questions answered   thoughts/feelings acknowledged   reassurance provided   questions encouraged  Goal: Readiness for Transition of Care  Outcome: Ongoing, Progressing  Goal: Rounds/Family Conference  Outcome: Ongoing, Progressing     Problem: Skin Injury Risk Increased  Goal: Skin Health and Integrity  Outcome: Ongoing, Progressing  Intervention: Optimize Skin Protection  Flowsheets (Taken 8/26/2020 1852)  Pressure Reduction Techniques:   frequent weight shift encouraged   weight shift assistance provided  Pressure Reduction Devices: positioning supports utilized  Skin Protection:   adhesive use limited   electrode sites changed   incontinence pads utilized   tubing/devices free from skin contact  Head of Bed (HOB): HOB elevated  Intervention: Promote and Optimize Oral Intake  Flowsheets (Taken 8/26/2020 1852)  Oral Nutrition Promotion: calorie dense foods provided

## 2020-08-26 NOTE — SUBJECTIVE & OBJECTIVE
Interval History: Patient seen and examined this AM. NAEON. Cultures remain without growth to date. Vitals stable and laboratory studies unremarkable. Pending LTAC placement at this time. Will need PICC placement prior to discharge.    Review of Systems   Constitutional: Negative for appetite change and fever.   HENT: Negative for congestion, sore throat and trouble swallowing.    Eyes: Negative for photophobia, pain and discharge.   Respiratory: Negative for cough and shortness of breath.    Cardiovascular: Negative for chest pain and palpitations.   Gastrointestinal: Negative for abdominal pain, diarrhea, nausea and vomiting.   Genitourinary: Negative for difficulty urinating.   Musculoskeletal: Positive for arthralgias and joint swelling. Negative for back pain, myalgias and neck pain.   Skin: Negative for pallor and rash.   Neurological: Negative for light-headedness, numbness and headaches.     Objective:     Vital Signs (Most Recent):  Temp: 97.3 °F (36.3 °C) (08/26/20 1627)  Pulse: 86 (08/26/20 1627)  Resp: 18 (08/26/20 1627)  BP: (!) 98/53 (08/26/20 1627)  SpO2: 99 % (08/26/20 1627) Vital Signs (24h Range):  Temp:  [97 °F (36.1 °C)-97.9 °F (36.6 °C)] 97.3 °F (36.3 °C)  Pulse:  [76-87] 86  Resp:  [16-18] 18  SpO2:  [94 %-99 %] 99 %  BP: ()/(53-72) 98/53     Weight: 63.5 kg (140 lb)  Body mass index is 21.29 kg/m².  No intake or output data in the 24 hours ending 08/26/20 7996     Physical Exam  Constitutional:       Appearance: Normal appearance.   Eyes:      Conjunctiva/sclera: Conjunctivae normal.      Pupils: Pupils are equal, round, and reactive to light.   Cardiovascular:      Rate and Rhythm: Normal rate and regular rhythm.      Pulses: Normal pulses.      Heart sounds: Normal heart sounds.   Pulmonary:      Effort: Pulmonary effort is normal. No respiratory distress.      Breath sounds: Normal breath sounds.   Abdominal:      General: Bowel sounds are normal. There is no distension.       Palpations: Abdomen is soft.      Tenderness: There is no abdominal tenderness.   Musculoskeletal:         General: Swelling and tenderness present.      Comments: Dressings in place to L shoulder, R knee, R ankle.    Skin:     General: Skin is warm.      Findings: No bruising or rash.   Neurological:      Mental Status: He is alert and oriented to person, place, and time.   Psychiatric:         Mood and Affect: Affect is angry.         Behavior: Behavior is agitated.         Significant Labs:   BMP:   Recent Labs   Lab 08/26/20  0303   GLU 86      K 4.2      CO2 28   BUN 20   CREATININE 0.7   CALCIUM 9.0     CBC:   Recent Labs   Lab 08/25/20  0544 08/26/20  0303   WBC 10.48 7.43   HGB 12.1* 10.9*   HCT 37.0* 34.7*   * 410*       Significant Imaging: I have reviewed all pertinent imaging results/findings within the past 24 hours.

## 2020-08-26 NOTE — PROGRESS NOTES
[facility] Hospital Medicine  Progress Note    Patient Name: Bebo Koroma  MRN: [id number]  Patient Class: IP- Inpatient   Admission Date: 8/19/2020  Length of Stay: 6 days  Attending Physician: Ronna Greene MD  Primary Care Provider: Primary Doctor [facility] Medicine Team: [facility] Ender Fan MD    Subjective:     Principal Problem:SIRS (systemic inflammatory response syndrome)        HPI:  Mr. Koroma is a 24 year old M with a PMHx of IV drug abuse and depression that presented to the ED complaining of left shoulder pain, right knee pain, and right ankle pain. Pain started when the patient woke up about 3 days ago, mostly left shoulder pain at that time. He then developed right ankle pain and right knee pain. He describes the pain as constant, sharp and worse with movement. Shoulder pain radiates down to the antecubital fossa region. He tried taking Tylenol with no relief. He has experienced significant decrease in ROM of left shoulder and states that he could not walk for 2 days due to his pain. Reports subjective fevers, chills, fatigue, general weakness, night sweats. Denies N/V, chest pain, SOB, abdominal pain, diarrhea, pain with urination. Patient reports that he last used IV opioids about 3 days ago. Injects into the left arm. On Subox-one at home but has not taken in 3 days. Denies alcohol use and other recreational drugs.     Pt arrived to ED with temp of 101.4, tachycardic elevated CRP and ESR. WBC and lactate wnl. Blood cultures done and 1 dose of vanc and zosyn given along with IVF. Xray L shoulder with no significant findings.     Overview/Hospital Course:  Pt admitted to IM 2 for further management of sepsis (source unknown at the time). Ortho consulted to r/o septic arthritis. Xray of right knee and ankle ordered. Psych consulted for management of addiction and medications (on Abilify, Prozac, and Subox-one at home). R knee tapped (31K WBC, 90% segs) R  ankle tapped (600 WBC, 43% seg), L shoulder tapped (clotted). Cultures pending, NGTD. Taken to the OR on 8/19 for arthroscopy of knee and shoulder and arthrotomy of ankle. 8/20 pt in pain, crying, sweating with chills. Subox-one unable to be restarted due to patient being on opioids. Adjusted pain medications for better pain control. Started opioid withdrawal protocol per psych recs. Restarting Abilify and Prozac per psych recs. Patient has had outburst with staff, stating his pain is not being controlled. Acute pain service consulted for further recs. Increased Prozac to 20 mg daily due to patient stating he takes 40 mg at home and his mood swings. Pending LTAC placement at this time.    Interval History: Patient seen and examined this AM. NAEON. Cultures remain without growth to date. Vitals stable and laboratory studies unremarkable. Pending LTAC placement at this time. Will need PICC placement prior to discharge.    Review of Systems   Constitutional: Negative for appetite change and fever.   HENT: Negative for congestion, sore throat and trouble swallowing.    Eyes: Negative for photophobia, pain and discharge.   Respiratory: Negative for cough and shortness of breath.    Cardiovascular: Negative for chest pain and palpitations.   Gastrointestinal: Negative for abdominal pain, diarrhea, nausea and vomiting.   Genitourinary: Negative for difficulty urinating.   Musculoskeletal: Positive for arthralgias and joint swelling. Negative for back pain, myalgias and neck pain.   Skin: Negative for pallor and rash.   Neurological: Negative for light-headedness, numbness and headaches.     Objective:     Vital Signs (Most Recent):  Temp: 97.3 °F (36.3 °C) (08/26/20 1627)  Pulse: 86 (08/26/20 1627)  Resp: 18 (08/26/20 1627)  BP: (!) 98/53 (08/26/20 1627)  SpO2: 99 % (08/26/20 1627) Vital Signs (24h Range):  Temp:  [97 °F (36.1 °C)-97.9 °F (36.6 °C)] 97.3 °F (36.3 °C)  Pulse:  [76-87] 86  Resp:  [16-18] 18  SpO2:  [94 %-99  %] 99 %  BP: ()/(53-72) 98/53     Weight: 63.5 kg (140 lb)  Body mass index is 21.29 kg/m².  No intake or output data in the 24 hours ending 08/26/20 1714     Physical Exam  Constitutional:       Appearance: Normal appearance.   Eyes:      Conjunctiva/sclera: Conjunctivae normal.      Pupils: Pupils are equal, round, and reactive to light.   Cardiovascular:      Rate and Rhythm: Normal rate and regular rhythm.      Pulses: Normal pulses.      Heart sounds: Normal heart sounds.   Pulmonary:      Effort: Pulmonary effort is normal. No respiratory distress.      Breath sounds: Normal breath sounds.   Abdominal:      General: Bowel sounds are normal. There is no distension.      Palpations: Abdomen is soft.      Tenderness: There is no abdominal tenderness.   Musculoskeletal:         General: Swelling and tenderness present.      Comments: Dressings in place to L shoulder, R knee, R ankle.    Skin:     General: Skin is warm.      Findings: No bruising or rash.   Neurological:      Mental Status: He is alert and oriented to person, place, and time.   Psychiatric:         Mood and Affect: Affect is angry.         Behavior: Behavior is agitated.         Significant Labs:   BMP:   Recent Labs   Lab 08/26/20  0303   GLU 86      K 4.2      CO2 28   BUN 20   CREATININE 0.7   CALCIUM 9.0     CBC:   Recent Labs   Lab 08/25/20  0544 08/26/20  0303   WBC 10.48 7.43   HGB 12.1* 10.9*   HCT 37.0* 34.7*   * 410*       Significant Imaging: I have reviewed all pertinent imaging results/findings within the past 24 hours.      Assessment/Plan:      * SIRS (systemic inflammatory response syndrome)  24 year old M with a PMHx of IV drug abuse and depression presented to the ED complaining of left shoulder pain, right knee pain, and right ankle pain. On arrival temp 101.4, pulse 111, elevated CRP and Sed rate. WBC wnl. He has significant decreased ROM of left shoulder and swelling of right knee. Infectious workup  "started in the ED. Vanc and zosyn given in the ED.    Xray left shoulder, R knee, R ankle, CXR, and US right lower leg: no significant findings    POD 5: arthroscopy R knee and L shoulder, arthrotomy R ankle -  per ortho op-note "I think this is either a very early case of septic arthritis of multiple joints versus inflammatory poly arthritis. Given his febrile state and IV drug abuse I think he should be treated as though the septic arthritis even if his cultures fail to grow anything."    Plan:   -- Ortho consulted for septic arthritis r/o. Follwing recs  -- Aspiration of joints: R knee (31K WBC, 90% segs) R ankle (600 WBC, 43% segs), L shoulder clotted  -- Fluid aspiration gram stain pending  -- Continue Rocephin and Vanc.  -- ID consulted. Following recs.  -- Blood cultures NGTD  -- HIV negative  -- Hepatitis C antibody positive so will need follow up with Infectious Disease in [facility] as outpatient  -- Procalcitonin 0.67  -- Trend ESR and CRP daily  -- Working on placement for continued antibiotics.         Opioid withdrawal  See Opioid use disorder, severe, dependence      Opioid use disorder, severe, dependence  Pt reports IV heroin use since the age of 17. He has consistantly used for the past 5 years. He states he was recently 6 months sober but relapsed 1 week ago. He would like to resume his Subox-one while inpatient, however he must be off opioids for > 20 hours per psych.    Plan:   -- Addiction Psychiatry consulted and following  -- Pain Management Service also consulted for assisstance  -- Clonidine 0.1mg PO q4hr PRN for withdrawal associated HTN  -- Bentyl 10mg PO q6hr PRN for abdominal muscle cramps  -- Loperamide 2mg PO q6hr PRN for diarrhea  -- Robaxin 500mg PO q6hr PRN for muscle spasm  -- Zofran 4mg ODT q8hr PRN for nausea  -- Vistaril 50mg PO qHS PRN for insomnia  -- Tylenol 650 mg TID  -- Elavil 25 mg QHS  -- Abilify 5 mg QD  -- Prozac 20 mg QD  -- Gabapentin 300 mg BID  -- Ibuprofen " 800 mg TID with Protonix 40 mg daily    Septic arthritis of right ankle  See SIRS (systemic inflammatory response syndrome)      Septic arthritis of shoulder, left  See SIRS (systemic inflammatory response syndrome)      Septic arthritis of knee, right  See SIRS (systemic inflammatory response syndrome)      Anxiety and depression  Pt reports hx of anxiety/depression on Abilify and Prozac at home. He states he takes Prozac 40 mg daily and Abilify 20 mg daily which is different than how is documented in the chart. Patient is having mood swings with outbursts due to his pain not being controlled. Some of this may be due to his underlying psych history.     Plan:  -- Psych consulted for medication recs. Following  -- Abilify 5 mg daily  -- Increased Prozac to 20 mg daily to see if it helps with his mood.    Oligoarthritis of multiple sites - L shoulder, R knee, R ankle  IV drug user presented to the ED complaining of left shoulder pain, right knee pain, and right ankle pain. On arrival temp 101.4, pulse 111, elevated CRP and Sed rate. WBC wnl. He has significant decreased ROM of left shoulder and swelling of right knee. He states his pain has been severe, leading him unable to walk for 2 days.    Xray left shoulder, R knee, R ankle, CXR, and US right lower leg: no significant findings    Plan:   -- Ortho consulted. Following recs. See SIRS  -- Colchicine and Indomethacin d/eduardo  -- Oxycodone 10 mg Q4H PRN  -- Dilaudid 2 mg Q4H PRN  -- Scheduled Tylenol 650 mg TID      IVDU (intravenous drug user)  Pt reports use of IV drugs since the age of 17. States he has consistently used them for 5 years and became sober 6 months ago. He is on Subox-one at home which he last took 3 days ago. He relapsed last week. Last IV opioid use was 3 days ago. Patient has a 1 week old  at home that could be contributing to the relapse. On exam he was tremulous with mildly dilated pupils. He reports recent sweating and cold chills. He  denies N/V diarrhea. With his chronic use, he will likely be hyper-analgesic.     Plan:  -- Clinical Opiate Withdrawal Scale: 4  -- Ativan 0.5 mg PRN  -- Psych consulted for addiction management. Following recs. See opioid dependence.  -- Continue to monitor for signs of withdrawl      Elevated C-reactive protein (CRP)  See SIRS (systemic inflammatory response syndrome)      Elevated erythrocyte sedimentation rate  See SIRS (systemic inflammatory response syndrome)        VTE Risk Mitigation (From admission, onward)         Ordered     Place sequential compression device  Until discontinued      08/19/20 1643     IP VTE LOW RISK PATIENT  Once      08/19/20 1643                Discharge Planning   TITA: 8/28/2020     Code Status: Full Code   Is the patient medically ready for discharge?: Yes    Reason for patient still in hospital (select all that apply): Other (specify) Pending placement  Discharge Plan A: Long-term acute care facility (LTAC)   Discharge Delays: (!) Patient and Family Barriers              Ender Fan MD  Department of Hospital Medicine   [facility]

## 2020-08-26 NOTE — PLAN OF CARE
[facility]    FACILITY TRANSFER ORDERS      Patient Name: Bebo Koroma  YOB: 1995    PCP: Primary Doctor No   PCP Address: None  PCP Phone Number: None  PCP Fax: None    Encounter Date: 2020    Admit to: Long term acute care (LTAC)    Vital Signs:  Routine    Diagnoses:   Active Hospital Problems    Diagnosis  POA    *SIRS (systemic inflammatory response syndrome) [R65.10]  Yes    Opioid use disorder, severe, dependence [F11.20]  Yes     Chronic    Opioid withdrawal [F11.23]  Yes    Elevated erythrocyte sedimentation rate [R70.0]  Yes    Elevated C-reactive protein (CRP) [R79.82]  Yes    IVDU (intravenous drug user) [F19.90]  Yes    Oligoarthritis of multiple sites - L shoulder, R knee, R ankle [M13.89]  Yes    Anxiety and depression [F41.9, F32.9]  Yes    Septic arthritis of knee, right [M00.9]  Yes    Septic arthritis of shoulder, left [M00.9]  Yes    Septic arthritis of right ankle [M00.9]  Yes      Resolved Hospital Problems   No resolved problems to display.       Allergies:  Review of patient's allergies indicates:   Allergen Reactions    Penicillins Hives     Unclear of last episode of allergic reaction  Tolerated ceftriaxone 2020       Diet: regular diet    Activities: Activity as tolerated    Nursin) Vancomycin IV 1,250 mg Q8H and ceftriaxone 2 g Q24H.   2) Continue vancomycin and ceftriaxone. Trough goal 15-20   3) End date of IV antibiotics on 2020 for both.    PICC Care:   Scrub the Hub: Prior to accessing the line, always perform a 30 second alcohol scrub  Each lumen of the central line is to be flushed at least daily with 10 mL Normal Saline and 3 mL Heparin flush (100 units/mL)  Skilled Nurse (SN) may draw blood from IV access  Date of removal: 2020 following completion of IV antibiotics     Labs: Weekly outpatient laboratory on Monday or Tuesday while on IV antibiotics.  · CBC  · CMP or BMP  · ESR and CRP  · Vancomycin trough.  Target 15-20  · Fax laboratory results to [facility] at [phone] with attn: SANCHO SOSA    CONSULTS:    Physical Therapy to evaluate and treat.  and Occupational Therapy to evaluate and treat.    MISCELLANEOUS CARE:  None     WOUND CARE ORDERS  None    Medications: Review discharge medications with patient and family and provide education.    Current Discharge Medication List      START taking these medications    Details   acetaminophen (TYLENOL) 325 MG tablet Take 2 tablets (650 mg total) by mouth 3 (three) times daily.  Refills: 0      amitriptyline (ELAVIL) 25 MG tablet Take 1 tablet (25 mg total) by mouth every evening.  Qty: 30 tablet, Refills: 11      cefTRIAXone (ROCEPHIN) 2 g/50 mL PgBk IVPB  Inject 50 mLs (2 g total) into the vein once daily. for 21 days  Qty: 21 each, Refills: 0   END DATE: 09/16/2020   dicyclomine (BENTYL) 10 MG capsule Take 1 capsule (10 mg total) by mouth every 6 (six) hours as needed (abdominal cramps).  Qty: 120 capsule, Refills: 0      hydrOXYzine pamoate (VISTARIL) 50 MG Cap Take 1 capsule (50 mg total) by mouth nightly as needed (Insomnia).  Qty: 30 capsule, Refills: 0      ibuprofen (ADVIL,MOTRIN) 800 MG tablet Take 1 tablet (800 mg total) by mouth 3 (three) times daily.      loperamide (IMODIUM) 2 mg capsule Take 1 capsule (2 mg total) by mouth every 6 (six) hours as needed for Diarrhea.  Qty: 40 capsule, Refills: 0      methocarbamoL (ROBAXIN) 750 MG Tab Take 1 tablet (750 mg total) by mouth every 6 (six) hours. for 10 days  Qty: 40 tablet, Refills: 0      ondansetron (ZOFRAN-ODT) 8 MG TbDL Take 1 tablet (8 mg total) by mouth every 8 (eight) hours as needed.  Qty: 6 tablet      oxyCODONE (ROXICODONE) 10 mg Tab immediate release tablet Take 1 tablet (10 mg total) by mouth every 6 (six) hours as needed for Pain.  Qty: 20 tablet, Refills: 0    Comments: Quantity prescribed more than 7 day supply? No      pantoprazole (PROTONIX) 40 MG tablet Take 1 tablet (40 mg total) by  mouth once daily.  Qty: 30 tablet, Refills: 11      vancomycin HCl (VANCOMYCIN 1 G/250 ML D5W, READY TO MIX SYSTEM,) Inject 375 mLs (1.5 g total) into the vein every 8 (eight) hours. for 21 days  Qty: 43455 mL, Refills: 0   END DATE: 09/16/2020      CONTINUE these medications which have CHANGED    Details   FLUoxetine 20 MG capsule Take 1 capsule (20 mg total) by mouth once daily.  Qty: 30 capsule, Refills: 11      gabapentin (NEURONTIN) 300 MG capsule Take 1 capsule (300 mg total) by mouth 2 (two) times daily.  Qty: 60 capsule, Refills: 11         CONTINUE these medications which have NOT CHANGED    Details   ARIPiprazole (ABILIFY) 5 MG Tab Take 5 mg by mouth once daily.          STOP taking these medications       buprenorphine-naloxone (SUBOXONE) 8-2 mg Film Comments:   Reason for Stopping: cessation of opiates > 24hrs        buPROPion (WELLBUTRIN XL) 150 MG TB24 tablet Comments:   Reason for Stopping: alternative therapy        cloNIDine (CATAPRES) 0.1 MG tablet Comments:   Reason for Stopping: not needed                _________________________________  Ender Fan MD  08/26/2020

## 2020-08-26 NOTE — PLAN OF CARE
Pt will need additional IVABX vanco and ceftriaxone for 4 weeks. SW Referrals, [facility], [facility], [facility] ,[facility] and [facility] with 3 denials . Will cont to follow.     08/26/20 1307   Discharge Reassessment   Assessment Type Discharge Planning Reassessment   Provided patient/caregiver education on the expected discharge date and the discharge plan Yes   Do you have any problems affording any of your prescribed medications? No   Discharge Plan A Long-term acute care facility (LTAC)   Discharge Plan B Long-term acute care facility (LTAC)   DME Needed Upon Discharge  none   Anticipated Discharge Disposition LTAC   Can the patient/caregiver answer the patient profile reliably? Yes, cognitively intact   How does the patient rate their overall health at the present time? Good   Describe the patient's ability to walk at the present time. No restrictions   How often would a person be available to care for the patient? Occasionally   Number of comorbid conditions (as recorded on the chart) Two   Post-Acute Status   Post-Acute Authorization Placement   Post-Acute Placement Status Awaiting Internal Medical Clearance   Discharge Delays (!) Patient and Family Barriers   Elham Nobles, MSN  Case Management  Ext [phone]

## 2020-08-26 NOTE — PLAN OF CARE
08/26/20 0815   Post-Acute Status   Post-Acute Authorization Placement   Post-Acute Placement Status Referrals Sent     Sw did reach out to [place] with [facility] to see if Pt meets LTAC criteria. Sw to follow and update IM team. Sw informed by [place] with [facility] that she will follow denials and submit to Pt's insurance, Sw to follow, staff updated.

## 2020-08-27 LAB
ALBUMIN SERPL BCP-MCNC: 2.9 G/DL (ref 3.5–5.2)
ALP SERPL-CCNC: 58 U/L (ref 55–135)
ALT SERPL W/O P-5'-P-CCNC: 8 U/L (ref 10–44)
ANION GAP SERPL CALC-SCNC: 6 MMOL/L (ref 8–16)
AST SERPL-CCNC: 13 U/L (ref 10–40)
BACTERIA SPEC ANAEROBE CULT: NORMAL
BASOPHILS # BLD AUTO: 0.05 K/UL (ref 0–0.2)
BASOPHILS NFR BLD: 0.8 % (ref 0–1.9)
BILIRUB SERPL-MCNC: 0.2 MG/DL (ref 0.1–1)
BUN SERPL-MCNC: 23 MG/DL (ref 6–20)
CALCIUM SERPL-MCNC: 8.7 MG/DL (ref 8.7–10.5)
CHLORIDE SERPL-SCNC: 99 MMOL/L (ref 95–110)
CO2 SERPL-SCNC: 29 MMOL/L (ref 23–29)
CREAT SERPL-MCNC: 0.7 MG/DL (ref 0.5–1.4)
CRP SERPL-MCNC: 24.9 MG/L (ref 0–8.2)
DIFFERENTIAL METHOD: ABNORMAL
EOSINOPHIL # BLD AUTO: 0.4 K/UL (ref 0–0.5)
EOSINOPHIL NFR BLD: 5.3 % (ref 0–8)
ERYTHROCYTE [DISTWIDTH] IN BLOOD BY AUTOMATED COUNT: 12.8 % (ref 11.5–14.5)
ERYTHROCYTE [SEDIMENTATION RATE] IN BLOOD BY WESTERGREN METHOD: 74 MM/HR (ref 0–23)
EST. GFR  (AFRICAN AMERICAN): >60 ML/MIN/1.73 M^2
EST. GFR  (NON AFRICAN AMERICAN): >60 ML/MIN/1.73 M^2
GLUCOSE SERPL-MCNC: 111 MG/DL (ref 70–110)
HCT VFR BLD AUTO: 32.8 % (ref 40–54)
HGB BLD-MCNC: 10.6 G/DL (ref 14–18)
IMM GRANULOCYTES # BLD AUTO: 0.07 K/UL (ref 0–0.04)
IMM GRANULOCYTES NFR BLD AUTO: 1.1 % (ref 0–0.5)
LYMPHOCYTES # BLD AUTO: 2.4 K/UL (ref 1–4.8)
LYMPHOCYTES NFR BLD: 36.6 % (ref 18–48)
MCH RBC QN AUTO: 31 PG (ref 27–31)
MCHC RBC AUTO-ENTMCNC: 32.3 G/DL (ref 32–36)
MCV RBC AUTO: 96 FL (ref 82–98)
MONOCYTES # BLD AUTO: 0.7 K/UL (ref 0.3–1)
MONOCYTES NFR BLD: 11.1 % (ref 4–15)
NEUTROPHILS # BLD AUTO: 3 K/UL (ref 1.8–7.7)
NEUTROPHILS NFR BLD: 45.1 % (ref 38–73)
NRBC BLD-RTO: 0 /100 WBC
PLATELET # BLD AUTO: 442 K/UL (ref 150–350)
PMV BLD AUTO: 10.3 FL (ref 9.2–12.9)
POTASSIUM SERPL-SCNC: 3.7 MMOL/L (ref 3.5–5.1)
PROT SERPL-MCNC: 7.1 G/DL (ref 6–8.4)
RBC # BLD AUTO: 3.42 M/UL (ref 4.6–6.2)
SODIUM SERPL-SCNC: 134 MMOL/L (ref 136–145)
VANCOMYCIN TROUGH SERPL-MCNC: 21.4 UG/ML (ref 10–22)
WBC # BLD AUTO: 6.55 K/UL (ref 3.9–12.7)

## 2020-08-27 PROCEDURE — 63600175 PHARM REV CODE 636 W HCPCS: Performed by: STUDENT IN AN ORGANIZED HEALTH CARE EDUCATION/TRAINING PROGRAM

## 2020-08-27 PROCEDURE — 25000003 PHARM REV CODE 250: Performed by: STUDENT IN AN ORGANIZED HEALTH CARE EDUCATION/TRAINING PROGRAM

## 2020-08-27 PROCEDURE — 36415 COLL VENOUS BLD VENIPUNCTURE: CPT

## 2020-08-27 PROCEDURE — 25000003 PHARM REV CODE 250: Performed by: INTERNAL MEDICINE

## 2020-08-27 PROCEDURE — 63600175 PHARM REV CODE 636 W HCPCS: Performed by: INTERNAL MEDICINE

## 2020-08-27 PROCEDURE — 25000003 PHARM REV CODE 250: Performed by: ORTHOPAEDIC SURGERY

## 2020-08-27 PROCEDURE — 80202 ASSAY OF VANCOMYCIN: CPT

## 2020-08-27 PROCEDURE — 80053 COMPREHEN METABOLIC PANEL: CPT

## 2020-08-27 PROCEDURE — 99233 SBSQ HOSP IP/OBS HIGH 50: CPT | Mod: ,,, | Performed by: STUDENT IN AN ORGANIZED HEALTH CARE EDUCATION/TRAINING PROGRAM

## 2020-08-27 PROCEDURE — 99233 PR SUBSEQUENT HOSPITAL CARE,LEVL III: ICD-10-PCS | Mod: ,,, | Performed by: STUDENT IN AN ORGANIZED HEALTH CARE EDUCATION/TRAINING PROGRAM

## 2020-08-27 PROCEDURE — 86140 C-REACTIVE PROTEIN: CPT

## 2020-08-27 PROCEDURE — 94761 N-INVAS EAR/PLS OXIMETRY MLT: CPT

## 2020-08-27 PROCEDURE — 85652 RBC SED RATE AUTOMATED: CPT

## 2020-08-27 PROCEDURE — 11000001 HC ACUTE MED/SURG PRIVATE ROOM

## 2020-08-27 PROCEDURE — 85025 COMPLETE CBC W/AUTO DIFF WBC: CPT

## 2020-08-27 RX ADMIN — METHOCARBAMOL TABLETS 750 MG: 750 TABLET, COATED ORAL at 02:08

## 2020-08-27 RX ADMIN — METHOCARBAMOL TABLETS 750 MG: 750 TABLET, COATED ORAL at 05:08

## 2020-08-27 RX ADMIN — AMITRIPTYLINE HYDROCHLORIDE 25 MG: 25 TABLET, FILM COATED ORAL at 08:08

## 2020-08-27 RX ADMIN — OXYCODONE HYDROCHLORIDE 10 MG: 10 TABLET ORAL at 12:08

## 2020-08-27 RX ADMIN — ACETAMINOPHEN 650 MG: 325 TABLET ORAL at 04:08

## 2020-08-27 RX ADMIN — METHOCARBAMOL TABLETS 750 MG: 750 TABLET, COATED ORAL at 06:08

## 2020-08-27 RX ADMIN — GABAPENTIN 300 MG: 300 CAPSULE ORAL at 08:08

## 2020-08-27 RX ADMIN — OXYCODONE HYDROCHLORIDE 10 MG: 10 TABLET ORAL at 04:08

## 2020-08-27 RX ADMIN — IBUPROFEN 800 MG: 400 TABLET, FILM COATED ORAL at 08:08

## 2020-08-27 RX ADMIN — FLUOXETINE 20 MG: 20 CAPSULE ORAL at 08:08

## 2020-08-27 RX ADMIN — VANCOMYCIN HYDROCHLORIDE 1500 MG: 1.5 INJECTION, POWDER, LYOPHILIZED, FOR SOLUTION INTRAVENOUS at 02:08

## 2020-08-27 RX ADMIN — ARIPIPRAZOLE 5 MG: 5 TABLET ORAL at 08:08

## 2020-08-27 RX ADMIN — ACETAMINOPHEN 650 MG: 325 TABLET ORAL at 08:08

## 2020-08-27 RX ADMIN — HYDROMORPHONE HYDROCHLORIDE 2 MG: 1 INJECTION, SOLUTION INTRAMUSCULAR; INTRAVENOUS; SUBCUTANEOUS at 09:08

## 2020-08-27 RX ADMIN — HYDROMORPHONE HYDROCHLORIDE 2 MG: 1 INJECTION, SOLUTION INTRAMUSCULAR; INTRAVENOUS; SUBCUTANEOUS at 05:08

## 2020-08-27 RX ADMIN — OXYCODONE HYDROCHLORIDE 10 MG: 10 TABLET ORAL at 08:08

## 2020-08-27 RX ADMIN — VANCOMYCIN HYDROCHLORIDE 1250 MG: 1.25 INJECTION, POWDER, LYOPHILIZED, FOR SOLUTION INTRAVENOUS at 10:08

## 2020-08-27 RX ADMIN — ASPIRIN 81 MG: 81 TABLET, COATED ORAL at 08:08

## 2020-08-27 RX ADMIN — IBUPROFEN 800 MG: 400 TABLET, FILM COATED ORAL at 04:08

## 2020-08-27 RX ADMIN — CEFTRIAXONE 2 G: 2 INJECTION, SOLUTION INTRAVENOUS at 08:08

## 2020-08-27 RX ADMIN — VANCOMYCIN HYDROCHLORIDE 1250 MG: 1.25 INJECTION, POWDER, LYOPHILIZED, FOR SOLUTION INTRAVENOUS at 12:08

## 2020-08-27 RX ADMIN — HYDROMORPHONE HYDROCHLORIDE 2 MG: 1 INJECTION, SOLUTION INTRAMUSCULAR; INTRAVENOUS; SUBCUTANEOUS at 02:08

## 2020-08-27 RX ADMIN — HYDROMORPHONE HYDROCHLORIDE 2 MG: 1 INJECTION, SOLUTION INTRAMUSCULAR; INTRAVENOUS; SUBCUTANEOUS at 06:08

## 2020-08-27 RX ADMIN — PANTOPRAZOLE SODIUM 40 MG: 40 TABLET, DELAYED RELEASE ORAL at 09:08

## 2020-08-27 RX ADMIN — HYDROMORPHONE HYDROCHLORIDE 2 MG: 1 INJECTION, SOLUTION INTRAMUSCULAR; INTRAVENOUS; SUBCUTANEOUS at 10:08

## 2020-08-27 RX ADMIN — HYDROMORPHONE HYDROCHLORIDE 2 MG: 1 INJECTION, SOLUTION INTRAMUSCULAR; INTRAVENOUS; SUBCUTANEOUS at 01:08

## 2020-08-27 RX ADMIN — METHOCARBAMOL TABLETS 750 MG: 750 TABLET, COATED ORAL at 12:08

## 2020-08-27 NOTE — PROGRESS NOTES
Pharmacokinetic Assessment Follow Up: IV Vancomycin    Vancomycin serum concentration assessment(s):    The trough level was drawn correctly and can be used to guide therapy at this time. The measurement is above the desired definitive target range of 15 to 20 mcg/mL.    Vancomycin Regimen Plan:    Change regimen to Vancomycin 1250 mg IV every 8 hours with next serum trough concentration measured at 1300 prior to dose on 8/28.    Drug levels (last 3 results):  Recent Labs   Lab Result Units 08/25/20  0544 08/25/20  1648 08/27/20  0853   Vancomycin, Random ug/mL  --  14.3  --    Vancomycin-Trough ug/mL 20.6  --  21.4       Pharmacy will continue to follow and monitor vancomycin.    Please contact pharmacy at extension [phone] for questions regarding this assessment.    Thank you for the consult,   Ksenia Staley       Patient brief summary:  Bebo Koroma is a 24 y.o. male initiated on antimicrobial therapy with IV Vancomycin for treatment of bone/joint infection    Drug Allergies:   Review of patient's allergies indicates:   Allergen Reactions    Penicillins Hives     Unclear of last episode of allergic reaction  Tolerated ceftriaxone 08/2020       Actual Body Weight:   63.5 kg    Renal Function:   Estimated Creatinine Clearance: 147.4 mL/min (based on SCr of 0.7 mg/dL).,     Dialysis Method (if applicable):  N/A    CBC (last 72 hours):  Recent Labs   Lab Result Units 08/25/20  0544 08/26/20  0303 08/27/20  0410   WBC K/uL 10.48 7.43 6.55   Hemoglobin g/dL 12.1* 10.9* 10.6*   Hematocrit % 37.0* 34.7* 32.8*   Platelets K/uL 451* 410* 442*   Gran% % 68.3 48.3 45.1   Lymph% % 19.3 34.3 36.6   Mono% % 7.9 11.3 11.1   Eosinophil% % 3.1 4.8 5.3   Basophil% % 0.4 0.5 0.8   Differential Method  Automated Automated Automated       Metabolic Panel (last 72 hours):  Recent Labs   Lab Result Units 08/25/20  0545 08/26/20  0303 08/27/20  0410   Sodium mmol/L 138 138 134*   Potassium mmol/L 4.1 4.2 3.7   Chloride mmol/L 102 102  99   CO2 mmol/L 28 28 29   Glucose mg/dL 99 86 111*   BUN, Bld mg/dL 14 20 23*   Creatinine mg/dL 0.7 0.7 0.7   Albumin g/dL 3.2* 3.0* 2.9*   Total Bilirubin mg/dL 0.4 0.3 0.2   Alkaline Phosphatase U/L 68 59 58   AST U/L 12 10 13   ALT U/L 11 10 8*       Vancomycin Administrations:  vancomycin given in the last 96 hours                   vancomycin 1.25 g in dextrose 5% 250 mL IVPB (ready to mix) (mg) 1,250 mg New Bag 08/27/20 1249    vancomycin 1.5 g in dextrose 5 % 250 mL IVPB (ready to mix) (mg) 1,500 mg New Bag 08/27/20 0209     1,500 mg New Bag 08/26/20 1749     1,500 mg New Bag  0718    vancomycin 1.5 g in dextrose 5 % 250 mL IVPB (ready to mix) (mg) 1,500 mg New Bag 08/25/20 2327    vancomycin 1.5 g in dextrose 5 % 250 mL IVPB (ready to mix) (mg) 1,500 mg New Bag 08/25/20 0642     1,500 mg New Bag 08/24/20 2200     1,500 mg New Bag  1353     1,500 mg New Bag  0514     1,500 mg New Bag 08/23/20 2309                Microbiologic Results:  Microbiology Results (last 7 days)     Procedure Component Value Units Date/Time    Culture, Anaerobic [[id number]] Collected: 08/19/20 1805    Order Status: Completed Specimen: Joint Fluid from Shoulder, Left Updated: 08/27/20 0734     Anaerobic Culture No anaerobes isolated    Culture, Anaerobic [[id number]] Collected: 08/19/20 1802    Order Status: Completed Specimen: Joint Fluid from Knee, Right Updated: 08/27/20 0734     Anaerobic Culture No anaerobes isolated    Culture, Anaerobic [[id number]] Collected: 08/19/20 1804    Order Status: Completed Specimen: Joint Fluid from Ankle, Right Updated: 08/27/20 0734     Anaerobic Culture No anaerobes isolated    Fungus culture [[id number]] Collected: 08/19/20 1802    Order Status: Completed Specimen: Joint Fluid from Knee, Right Updated: 08/25/20 1210     Fungus (Mycology) Culture Culture in progress    Fungus culture [[id number]] Collected: 08/19/20 0427    Order Status: Completed Specimen: Joint Fluid from Shoulder, Left Updated:  08/25/20 1210     Fungus (Mycology) Culture Culture in progress    Fungus culture [[id number]] Collected: 08/19/20 1804    Order Status: Completed Specimen: Joint Fluid from Ankle, Right Updated: 08/25/20 1210     Fungus (Mycology) Culture Culture in progress    Blood culture [[id number]] Collected: 08/19/20 1644    Order Status: Completed Specimen: Blood from Peripheral, Antecubital, Right Updated: 08/24/20 1822     Blood Culture, Routine No growth after 5 days.    Blood culture x two cultures. Draw prior to antibiotics. [[id number]] Collected: 08/19/20 1140    Order Status: Completed Specimen: Blood from Peripheral, Antecubital, Right Updated: 08/24/20 1412     Blood Culture, Routine No growth after 5 days.    Narrative:      Aerobic and anaerobic    Blood culture x two cultures. Draw prior to antibiotics. [[id number]] Collected: 08/19/20 1141    Order Status: Completed Specimen: Blood from Peripheral, Forearm, Right Updated: 08/24/20 1412     Blood Culture, Routine No growth after 5 days.    Narrative:      Aerobic and anaerobic    Chlamydia/Neisseria gonorrhoeae RNA, TMA [[id number]] Collected: 08/19/20 1140    Order Status: Completed Updated: 08/22/20 1022     Neisseria gonorrhoeae RNA, TMA Negative     Comment: Chlamydia trachomatis and Neisseria gonorrhoeae by TMA is   negative, therefore no further testing added.  INTERPRETIVE INFORMATION: CT/NG TMA with Rfx to Confirmation  This test is intended for medical purposes only. It is not   intended for the evaluation of suspected sexual abuse or   for other medicolegal indications. Refer to the most recent   CDC recommendations for patients in whom a false positive   result may have adverse psychosocial impact.  Positive results will be confirmed with alternative nucleic   acid target assay.          Chlamydia trachomatis, RNA, TMA Negative     Comment: Performed By: [employer]  [address] [place] [id number]  : Francois Shell,  MD, MS         Aerobic culture [[id number]] Collected: 08/19/20 1802    Order Status: Completed Specimen: Joint Fluid from Knee, Right Updated: 08/22/20 0951     Aerobic Bacterial Culture No growth    Aerobic culture [[id number]] Collected: 08/19/20 1805    Order Status: Completed Specimen: Joint Fluid from Shoulder, Left Updated: 08/22/20 0951     Aerobic Bacterial Culture No growth    Aerobic culture [[id number]] Collected: 08/19/20 1804    Order Status: Completed Specimen: Joint Fluid from Ankle, Right Updated: 08/22/20 0951     Aerobic Bacterial Culture No growth    AFB Culture & Smear [[id number]] Collected: 08/19/20 1804    Order Status: Completed Specimen: Joint Fluid from Ankle, Right Updated: 08/20/20 2127     AFB Culture & Smear Culture in progress     AFB CULTURE STAIN No acid fast bacilli seen.    AFB Culture & Smear [[id number]] Collected: 08/19/20 1805    Order Status: Completed Specimen: Joint Fluid from Shoulder, Left Updated: 08/20/20 2127     AFB Culture & Smear Culture in progress     AFB CULTURE STAIN No acid fast bacilli seen.    AFB Culture & Smear [[id number]] Collected: 08/19/20 1802    Order Status: Completed Specimen: Joint Fluid from Knee, Right Updated: 08/20/20 2127     AFB Culture & Smear Culture in progress     AFB CULTURE STAIN No acid fast bacilli seen.

## 2020-08-27 NOTE — ASSESSMENT & PLAN NOTE
"24 year old M with a PMHx of IV drug abuse and depression presented to the ED complaining of left shoulder pain, right knee pain, and right ankle pain. On arrival temp 101.4, pulse 111, elevated CRP and Sed rate. WBC wnl. He has significant decreased ROM of left shoulder and swelling of right knee. Infectious workup started in the ED. Vanc and zosyn given in the ED.    Xray left shoulder, R knee, R ankle, CXR, and US right lower leg: no significant findings    POD 7: arthroscopy R knee and L shoulder, arthrotomy R ankle -  per ortho op-note "I think this is either a very early case of septic arthritis of multiple joints versus inflammatory poly arthritis. Given his febrile state and IV drug abuse I think he should be treated as though the septic arthritis even if his cultures fail to grow anything."    Plan:   -- Ortho consulted for septic arthritis r/o. Follwing recs  -- Aspiration of joints: R knee (31K WBC, 90% segs) R ankle (600 WBC, 43% segs), L shoulder clotted  -- Fluid aspiration gram stain pending  -- Continue Rocephin and Vanc.  -- ID consulted. Following recs.  -- Blood cultures NGTD  -- HIV negative  -- Hepatitis C antibody positive so will need follow up with Infectious Disease in [facility] as outpatient  -- Procalcitonin 0.67  -- Trend ESR and CRP daily  -- Working on placement for continued antibiotics.       "

## 2020-08-27 NOTE — PROGRESS NOTES
[facility] Hospital Medicine  Progress Note    Patient Name: Bebo Koroma  MRN: [id number]  Patient Class: IP- Inpatient   Admission Date: 8/19/2020  Length of Stay: 7 days  Attending Physician: Tariq Barrios MD  Primary Care Provider: Primary Doctor [facility] Medicine Team: [facility]OSP MED 2 Daly Hale MD    Subjective:     Principal Problem:SIRS (systemic inflammatory response syndrome)        HPI:  Mr. Koroma is a 24 year old M with a PMHx of IV drug abuse and depression that presented to the ED complaining of left shoulder pain, right knee pain, and right ankle pain. Pain started when the patient woke up about 3 days ago, mostly left shoulder pain at that time. He then developed right ankle pain and right knee pain. He describes the pain as constant, sharp and worse with movement. Shoulder pain radiates down to the antecubital fossa region. He tried taking Tylenol with no relief. He has experienced significant decrease in ROM of left shoulder and states that he could not walk for 2 days due to his pain. Reports subjective fevers, chills, fatigue, general weakness, night sweats. Denies N/V, chest pain, SOB, abdominal pain, diarrhea, pain with urination. Patient reports that he last used IV opioids about 3 days ago. Injects into the left arm. On Subox-one at home but has not taken in 3 days. Denies alcohol use and other recreational drugs.     Pt arrived to ED with temp of 101.4, tachycardic elevated CRP and ESR. WBC and lactate wnl. Blood cultures done and 1 dose of vanc and zosyn given along with IVF. Xray L shoulder with no significant findings.     Overview/Hospital Course:  Pt admitted to IM 2 for further management of sepsis (source unknown at the time). Ortho consulted to r/o septic arthritis. Xray of right knee and ankle ordered. Psych consulted for management of addiction and medications (on Abilify, Prozac, and Subox-one at home). R knee tapped (31K WBC, 90% segs)  R ankle tapped (600 WBC, 43% seg), L shoulder tapped (clotted). Cultures pending, NGTD. Taken to the OR on 8/19 for arthroscopy of knee and shoulder and arthrotomy of ankle. 8/20 pt in pain, crying, sweating with chills. Subox-one unable to be restarted due to patient being on opioids. Adjusted pain medications for better pain control. Started opioid withdrawal protocol per psych recs. Restarting Abilify and Prozac per psych recs. Patient has had outburst with staff, stating his pain is not being controlled. Acute pain service consulted for further recs. Increased Prozac to 20 mg daily due to patient stating he takes 40 mg at home and his mood swings. Pending LTAC placement at this time.    Interval History: NAEON. Patient awaiting LTAC placement. Spoke to the  - he has been approved for [facility] and is currently awaiting authorization.     Review of Systems   Constitutional: Negative for appetite change and fever.   HENT: Negative for congestion, sore throat and trouble swallowing.    Eyes: Negative for photophobia, pain and discharge.   Respiratory: Negative for cough and shortness of breath.    Cardiovascular: Negative for chest pain and palpitations.   Gastrointestinal: Negative for abdominal pain, diarrhea, nausea and vomiting.   Genitourinary: Negative for difficulty urinating.   Musculoskeletal: Positive for arthralgias and joint swelling. Negative for back pain, myalgias and neck pain.   Skin: Negative for pallor and rash.   Neurological: Negative for light-headedness, numbness and headaches.     Objective:     Vital Signs (Most Recent):  Temp: 97.8 °F (36.6 °C) (08/27/20 1301)  Pulse: 98 (08/27/20 1301)  Resp: 20 (08/27/20 1343)  BP: 117/64 (08/27/20 1301)  SpO2: 96 % (08/27/20 1301) Vital Signs (24h Range):  Temp:  [97.3 °F (36.3 °C)-98.7 °F (37.1 °C)] 97.8 °F (36.6 °C)  Pulse:  [77-98] 98  Resp:  [16-20] 20  SpO2:  [94 %-99 %] 96 %  BP: ()/(52-71) 117/64     Weight: 63.5 kg (140  lb)  Body mass index is 21.29 kg/m².  No intake or output data in the 24 hours ending 08/27/20 1424   Physical Exam  Constitutional:       Appearance: Normal appearance.   Eyes:      Conjunctiva/sclera: Conjunctivae normal.      Pupils: Pupils are equal, round, and reactive to light.   Cardiovascular:      Rate and Rhythm: Normal rate and regular rhythm.      Pulses: Normal pulses.      Heart sounds: Normal heart sounds.   Pulmonary:      Effort: Pulmonary effort is normal. No respiratory distress.      Breath sounds: Normal breath sounds.   Abdominal:      General: Bowel sounds are normal. There is no distension.      Palpations: Abdomen is soft.      Tenderness: There is no abdominal tenderness.   Musculoskeletal:         General: Swelling and tenderness present.      Comments: Dressings in place to L shoulder, R knee, R ankle.    Skin:     General: Skin is warm.      Findings: No bruising or rash.   Neurological:      Mental Status: He is alert and oriented to person, place, and time.   Psychiatric:         Mood and Affect: Affect is flat.         Behavior: Behavior is agitated.         Significant Labs:   BMP:   Recent Labs   Lab 08/27/20  0410   *   *   K 3.7   CL 99   CO2 29   BUN 23*   CREATININE 0.7   CALCIUM 8.7     CBC:   Recent Labs   Lab 08/26/20  0303 08/27/20  0410   WBC 7.43 6.55   HGB 10.9* 10.6*   HCT 34.7* 32.8*   * 442*       Significant Imaging: I have reviewed all pertinent imaging results/findings within the past 24 hours.      Assessment/Plan:      * SIRS (systemic inflammatory response syndrome)  24 year old M with a PMHx of IV drug abuse and depression presented to the ED complaining of left shoulder pain, right knee pain, and right ankle pain. On arrival temp 101.4, pulse 111, elevated CRP and Sed rate. WBC wnl. He has significant decreased ROM of left shoulder and swelling of right knee. Infectious workup started in the ED. Vanc and zosyn given in the ED.    Xray left  "shoulder, R knee, R ankle, CXR, and US right lower leg: no significant findings    POD 7: arthroscopy R knee and L shoulder, arthrotomy R ankle -  per ortho op-note "I think this is either a very early case of septic arthritis of multiple joints versus inflammatory poly arthritis. Given his febrile state and IV drug abuse I think he should be treated as though the septic arthritis even if his cultures fail to grow anything."    Plan:   -- Ortho consulted for septic arthritis r/o. Follwing recs  -- Aspiration of joints: R knee (31K WBC, 90% segs) R ankle (600 WBC, 43% segs), L shoulder clotted  -- Fluid aspiration gram stain pending  -- Continue Rocephin and Vanc.  -- ID consulted. Following recs.  -- Blood cultures NGTD  -- HIV negative  -- Hepatitis C antibody positive so will need follow up with Infectious Disease in [facility] as outpatient  -- Procalcitonin 0.67  -- Trend ESR and CRP daily  -- Working on placement for continued antibiotics.         Oligoarthritis of multiple sites - L shoulder, R knee, R ankle  IV drug user presented to the ED complaining of left shoulder pain, right knee pain, and right ankle pain. On arrival temp 101.4, pulse 111, elevated CRP and Sed rate. WBC wnl. He has significant decreased ROM of left shoulder and swelling of right knee. He states his pain has been severe, leading him unable to walk for 2 days.    Xray left shoulder, R knee, R ankle, CXR, and US right lower leg: no significant findings    Plan:   -- Ortho consulted. Following recs. See SIRS  -- Colchicine and Indomethacin d/eduardo  -- Oxycodone 10 mg Q4H PRN  -- Dilaudid 2 mg Q4H PRN  -- Scheduled Tylenol 650 mg TID      IVDU (intravenous drug user)  Pt reports use of IV drugs since the age of 17. States he has consistently used them for 5 years and became sober 6 months ago. He is on Subox-one at home which he last took 3 days ago. He relapsed last week. Last IV opioid use was 3 days ago. Patient has a 1 week old "  at home that could be contributing to the relapse. On exam he was tremulous with mildly dilated pupils. He reports recent sweating and cold chills. He denies N/V diarrhea. With his chronic use, he will likely be hyper-analgesic.     Plan:  -- Clinical Opiate Withdrawal Scale: 4  -- Ativan 0.5 mg PRN  -- Psych consulted for addiction management. Following recs. See opioid dependence.  -- Continue to monitor for signs of withdrawl      Opioid use disorder, severe, dependence  Pt reports IV heroin use since the age of 17. He has consistantly used for the past 5 years. He states he was recently 6 months sober but relapsed 1 week ago. He would like to resume his Subox-one while inpatient, however he must be off opioids for > 20 hours per psych.    Plan:   -- Addiction Psychiatry consulted and following  -- Pain Management Service also consulted for assisstance  -- Clonidine 0.1mg PO q4hr PRN for withdrawal associated HTN  -- Bentyl 10mg PO q6hr PRN for abdominal muscle cramps  -- Loperamide 2mg PO q6hr PRN for diarrhea  -- Robaxin 500mg PO q6hr PRN for muscle spasm  -- Zofran 4mg ODT q8hr PRN for nausea  -- Vistaril 50mg PO qHS PRN for insomnia  -- Tylenol 650 mg TID  -- Elavil 25 mg QHS  -- Abilify 5 mg QD  -- Prozac 20 mg QD  -- Gabapentin 300 mg BID  -- Ibuprofen 800 mg TID with Protonix 40 mg daily    Anxiety and depression  Pt reports hx of anxiety/depression on Abilify and Prozac at home. He states he takes Prozac 40 mg daily and Abilify 20 mg daily which is different than how is documented in the chart. Patient is having mood swings with outbursts due to his pain not being controlled. Some of this may be due to his underlying psych history.     Plan:  -- Psych consulted for medication recs. Following  -- Abilify 5 mg daily  -- Increased Prozac to 20 mg daily to see if it helps with his mood.    Opioid withdrawal  See Opioid use disorder, severe, dependence      Septic arthritis of right ankle  See SIRS  (systemic inflammatory response syndrome)      Septic arthritis of shoulder, left  See SIRS (systemic inflammatory response syndrome)      Septic arthritis of knee, right  See SIRS (systemic inflammatory response syndrome)      Elevated C-reactive protein (CRP)  See SIRS (systemic inflammatory response syndrome)      Elevated erythrocyte sedimentation rate  See SIRS (systemic inflammatory response syndrome)        VTE Risk Mitigation (From admission, onward)         Ordered     Place sequential compression device  Until discontinued      08/19/20 1643     IP VTE LOW RISK PATIENT  Once      08/19/20 1643                Discharge Planning   TITA: 8/28/2020     Code Status: Full Code   Is the patient medically ready for discharge?: Yes    Reason for patient still in hospital (select all that apply): Other (specify) Awaiting LTAC placement  Discharge Plan A: Long-term acute care facility (LTAC)   Discharge Delays: (!) Patient and Family Barriers              Daly Hale MD  Department of Hospital Medicine   [facility]

## 2020-08-27 NOTE — SUBJECTIVE & OBJECTIVE
Interval History: NAEON. Patient awaiting LTAC placement. Spoke to the  - he has been approved for [facility] and is currently awaiting authorization.     Review of Systems   Constitutional: Negative for appetite change and fever.   HENT: Negative for congestion, sore throat and trouble swallowing.    Eyes: Negative for photophobia, pain and discharge.   Respiratory: Negative for cough and shortness of breath.    Cardiovascular: Negative for chest pain and palpitations.   Gastrointestinal: Negative for abdominal pain, diarrhea, nausea and vomiting.   Genitourinary: Negative for difficulty urinating.   Musculoskeletal: Positive for arthralgias and joint swelling. Negative for back pain, myalgias and neck pain.   Skin: Negative for pallor and rash.   Neurological: Negative for light-headedness, numbness and headaches.     Objective:     Vital Signs (Most Recent):  Temp: 97.8 °F (36.6 °C) (08/27/20 1301)  Pulse: 98 (08/27/20 1301)  Resp: 20 (08/27/20 1343)  BP: 117/64 (08/27/20 1301)  SpO2: 96 % (08/27/20 1301) Vital Signs (24h Range):  Temp:  [97.3 °F (36.3 °C)-98.7 °F (37.1 °C)] 97.8 °F (36.6 °C)  Pulse:  [77-98] 98  Resp:  [16-20] 20  SpO2:  [94 %-99 %] 96 %  BP: ()/(52-71) 117/64     Weight: 63.5 kg (140 lb)  Body mass index is 21.29 kg/m².  No intake or output data in the 24 hours ending 08/27/20 1424   Physical Exam  Constitutional:       Appearance: Normal appearance.   Eyes:      Conjunctiva/sclera: Conjunctivae normal.      Pupils: Pupils are equal, round, and reactive to light.   Cardiovascular:      Rate and Rhythm: Normal rate and regular rhythm.      Pulses: Normal pulses.      Heart sounds: Normal heart sounds.   Pulmonary:      Effort: Pulmonary effort is normal. No respiratory distress.      Breath sounds: Normal breath sounds.   Abdominal:      General: Bowel sounds are normal. There is no distension.      Palpations: Abdomen is soft.      Tenderness: There is no abdominal  tenderness.   Musculoskeletal:         General: Swelling and tenderness present.      Comments: Dressings in place to L shoulder, R knee, R ankle.    Skin:     General: Skin is warm.      Findings: No bruising or rash.   Neurological:      Mental Status: He is alert and oriented to person, place, and time.   Psychiatric:         Mood and Affect: Affect is flat.         Behavior: Behavior is agitated.         Significant Labs:   BMP:   Recent Labs   Lab 08/27/20  0410   *   *   K 3.7   CL 99   CO2 29   BUN 23*   CREATININE 0.7   CALCIUM 8.7     CBC:   Recent Labs   Lab 08/26/20  0303 08/27/20  0410   WBC 7.43 6.55   HGB 10.9* 10.6*   HCT 34.7* 32.8*   * 442*       Significant Imaging: I have reviewed all pertinent imaging results/findings within the past 24 hours.

## 2020-08-27 NOTE — PLAN OF CARE
VALERIE sent LTAC referrals to [facility], [facility], and [facility].     VALERIE left messages for [facility] and [facility] to follow up on referral. Patient declined by [facility].    Nany Rodriguez LMSW  [facility]

## 2020-08-28 LAB
ALBUMIN SERPL BCP-MCNC: 3.4 G/DL (ref 3.5–5.2)
ALP SERPL-CCNC: 66 U/L (ref 55–135)
ALT SERPL W/O P-5'-P-CCNC: 11 U/L (ref 10–44)
ANION GAP SERPL CALC-SCNC: 7 MMOL/L (ref 8–16)
AST SERPL-CCNC: 11 U/L (ref 10–40)
BASOPHILS # BLD AUTO: 0.05 K/UL (ref 0–0.2)
BASOPHILS NFR BLD: 0.8 % (ref 0–1.9)
BILIRUB SERPL-MCNC: 0.3 MG/DL (ref 0.1–1)
BUN SERPL-MCNC: 16 MG/DL (ref 6–20)
CALCIUM SERPL-MCNC: 9.6 MG/DL (ref 8.7–10.5)
CHLORIDE SERPL-SCNC: 102 MMOL/L (ref 95–110)
CO2 SERPL-SCNC: 29 MMOL/L (ref 23–29)
CREAT SERPL-MCNC: 0.7 MG/DL (ref 0.5–1.4)
CRP SERPL-MCNC: 14.2 MG/L (ref 0–8.2)
DIFFERENTIAL METHOD: ABNORMAL
EOSINOPHIL # BLD AUTO: 0.2 K/UL (ref 0–0.5)
EOSINOPHIL NFR BLD: 3.2 % (ref 0–8)
ERYTHROCYTE [DISTWIDTH] IN BLOOD BY AUTOMATED COUNT: 12.8 % (ref 11.5–14.5)
ERYTHROCYTE [SEDIMENTATION RATE] IN BLOOD BY WESTERGREN METHOD: 77 MM/HR (ref 0–23)
EST. GFR  (AFRICAN AMERICAN): >60 ML/MIN/1.73 M^2
EST. GFR  (NON AFRICAN AMERICAN): >60 ML/MIN/1.73 M^2
GLUCOSE SERPL-MCNC: 94 MG/DL (ref 70–110)
HCT VFR BLD AUTO: 37.3 % (ref 40–54)
HGB BLD-MCNC: 11.5 G/DL (ref 14–18)
IMM GRANULOCYTES # BLD AUTO: 0.04 K/UL (ref 0–0.04)
IMM GRANULOCYTES NFR BLD AUTO: 0.6 % (ref 0–0.5)
LYMPHOCYTES # BLD AUTO: 2.6 K/UL (ref 1–4.8)
LYMPHOCYTES NFR BLD: 41.6 % (ref 18–48)
MCH RBC QN AUTO: 29.9 PG (ref 27–31)
MCHC RBC AUTO-ENTMCNC: 30.8 G/DL (ref 32–36)
MCV RBC AUTO: 97 FL (ref 82–98)
MONOCYTES # BLD AUTO: 0.6 K/UL (ref 0.3–1)
MONOCYTES NFR BLD: 9.7 % (ref 4–15)
NEUTROPHILS # BLD AUTO: 2.7 K/UL (ref 1.8–7.7)
NEUTROPHILS NFR BLD: 44.1 % (ref 38–73)
NRBC BLD-RTO: 0 /100 WBC
PLATELET # BLD AUTO: 491 K/UL (ref 150–350)
PMV BLD AUTO: 9.8 FL (ref 9.2–12.9)
POTASSIUM SERPL-SCNC: 4.3 MMOL/L (ref 3.5–5.1)
PROT SERPL-MCNC: 8.1 G/DL (ref 6–8.4)
RBC # BLD AUTO: 3.84 M/UL (ref 4.6–6.2)
SODIUM SERPL-SCNC: 138 MMOL/L (ref 136–145)
VANCOMYCIN TROUGH SERPL-MCNC: 13.5 UG/ML (ref 10–22)
WBC # BLD AUTO: 6.16 K/UL (ref 3.9–12.7)

## 2020-08-28 PROCEDURE — 63600175 PHARM REV CODE 636 W HCPCS: Performed by: STUDENT IN AN ORGANIZED HEALTH CARE EDUCATION/TRAINING PROGRAM

## 2020-08-28 PROCEDURE — 99233 PR SUBSEQUENT HOSPITAL CARE,LEVL III: ICD-10-PCS | Mod: ,,, | Performed by: STUDENT IN AN ORGANIZED HEALTH CARE EDUCATION/TRAINING PROGRAM

## 2020-08-28 PROCEDURE — 25000003 PHARM REV CODE 250: Performed by: ORTHOPAEDIC SURGERY

## 2020-08-28 PROCEDURE — 85652 RBC SED RATE AUTOMATED: CPT

## 2020-08-28 PROCEDURE — 80053 COMPREHEN METABOLIC PANEL: CPT

## 2020-08-28 PROCEDURE — 25000003 PHARM REV CODE 250: Performed by: STUDENT IN AN ORGANIZED HEALTH CARE EDUCATION/TRAINING PROGRAM

## 2020-08-28 PROCEDURE — 85025 COMPLETE CBC W/AUTO DIFF WBC: CPT

## 2020-08-28 PROCEDURE — 99233 SBSQ HOSP IP/OBS HIGH 50: CPT | Mod: ,,, | Performed by: STUDENT IN AN ORGANIZED HEALTH CARE EDUCATION/TRAINING PROGRAM

## 2020-08-28 PROCEDURE — 11000001 HC ACUTE MED/SURG PRIVATE ROOM

## 2020-08-28 PROCEDURE — 25000003 PHARM REV CODE 250: Performed by: INTERNAL MEDICINE

## 2020-08-28 PROCEDURE — 36415 COLL VENOUS BLD VENIPUNCTURE: CPT

## 2020-08-28 PROCEDURE — 80202 ASSAY OF VANCOMYCIN: CPT

## 2020-08-28 PROCEDURE — 94761 N-INVAS EAR/PLS OXIMETRY MLT: CPT

## 2020-08-28 PROCEDURE — 86140 C-REACTIVE PROTEIN: CPT

## 2020-08-28 PROCEDURE — 97110 THERAPEUTIC EXERCISES: CPT

## 2020-08-28 RX ORDER — HYDROXYZINE PAMOATE 25 MG/1
50 CAPSULE ORAL EVERY 8 HOURS PRN
Status: DISCONTINUED | OUTPATIENT
Start: 2020-08-28 | End: 2020-09-01 | Stop reason: HOSPADM

## 2020-08-28 RX ORDER — HYDROMORPHONE HYDROCHLORIDE 1 MG/ML
2 INJECTION, SOLUTION INTRAMUSCULAR; INTRAVENOUS; SUBCUTANEOUS EVERY 6 HOURS PRN
Status: DISCONTINUED | OUTPATIENT
Start: 2020-08-28 | End: 2020-08-30

## 2020-08-28 RX ADMIN — AMITRIPTYLINE HYDROCHLORIDE 25 MG: 25 TABLET, FILM COATED ORAL at 08:08

## 2020-08-28 RX ADMIN — ARIPIPRAZOLE 5 MG: 5 TABLET ORAL at 08:08

## 2020-08-28 RX ADMIN — METHOCARBAMOL TABLETS 750 MG: 750 TABLET, COATED ORAL at 05:08

## 2020-08-28 RX ADMIN — OXYCODONE HYDROCHLORIDE 10 MG: 10 TABLET ORAL at 11:08

## 2020-08-28 RX ADMIN — ACETAMINOPHEN 650 MG: 325 TABLET ORAL at 02:08

## 2020-08-28 RX ADMIN — METHOCARBAMOL TABLETS 750 MG: 750 TABLET, COATED ORAL at 11:08

## 2020-08-28 RX ADMIN — IBUPROFEN 800 MG: 400 TABLET, FILM COATED ORAL at 08:08

## 2020-08-28 RX ADMIN — ACETAMINOPHEN 650 MG: 325 TABLET ORAL at 08:08

## 2020-08-28 RX ADMIN — GABAPENTIN 300 MG: 300 CAPSULE ORAL at 08:08

## 2020-08-28 RX ADMIN — OXYCODONE HYDROCHLORIDE 10 MG: 10 TABLET ORAL at 10:08

## 2020-08-28 RX ADMIN — IBUPROFEN 800 MG: 400 TABLET, FILM COATED ORAL at 02:08

## 2020-08-28 RX ADMIN — FLUOXETINE 20 MG: 20 CAPSULE ORAL at 10:08

## 2020-08-28 RX ADMIN — HYDROMORPHONE HYDROCHLORIDE 2 MG: 1 INJECTION, SOLUTION INTRAMUSCULAR; INTRAVENOUS; SUBCUTANEOUS at 02:08

## 2020-08-28 RX ADMIN — HYDROMORPHONE HYDROCHLORIDE 2 MG: 1 INJECTION, SOLUTION INTRAMUSCULAR; INTRAVENOUS; SUBCUTANEOUS at 04:08

## 2020-08-28 RX ADMIN — ASPIRIN 81 MG: 81 TABLET, COATED ORAL at 08:08

## 2020-08-28 RX ADMIN — CEFTRIAXONE 2 G: 2 INJECTION, SOLUTION INTRAVENOUS at 08:08

## 2020-08-28 RX ADMIN — HYDROMORPHONE HYDROCHLORIDE 2 MG: 1 INJECTION, SOLUTION INTRAMUSCULAR; INTRAVENOUS; SUBCUTANEOUS at 08:08

## 2020-08-28 RX ADMIN — VANCOMYCIN HYDROCHLORIDE 2000 MG: 100 INJECTION, POWDER, LYOPHILIZED, FOR SOLUTION INTRAVENOUS at 10:08

## 2020-08-28 RX ADMIN — OXYCODONE HYDROCHLORIDE 10 MG: 10 TABLET ORAL at 05:08

## 2020-08-28 RX ADMIN — OXYCODONE HYDROCHLORIDE 10 MG: 10 TABLET ORAL at 04:08

## 2020-08-28 RX ADMIN — METHOCARBAMOL TABLETS 750 MG: 750 TABLET, COATED ORAL at 12:08

## 2020-08-28 RX ADMIN — VANCOMYCIN HYDROCHLORIDE 1250 MG: 1.25 INJECTION, POWDER, LYOPHILIZED, FOR SOLUTION INTRAVENOUS at 05:08

## 2020-08-28 RX ADMIN — PANTOPRAZOLE SODIUM 40 MG: 40 TABLET, DELAYED RELEASE ORAL at 08:08

## 2020-08-28 RX ADMIN — VANCOMYCIN HYDROCHLORIDE 1250 MG: 1.25 INJECTION, POWDER, LYOPHILIZED, FOR SOLUTION INTRAVENOUS at 02:08

## 2020-08-28 NOTE — PLAN OF CARE
Problem: Occupational Therapy Goal  Goal: Occupational Therapy Goal  Description: Goals to be met by: 9/4/2020     Patient will increase functional independence with ADLs by performing:    UE Dressing with Set-up Assistance. Met  LE Dressing with Stand-by Assistance. Met  Grooming while standing at sink with Modified [place]. Met simulating task  Toileting from toilet with Supervision for hygiene and clothing management. Met per patient report  Stand pivot transfers with Modified [place]. Met  Toilet transfer to toilet with Modified [place]. Met  Patient will be independent with B UE ROM exercise HEP. Met    Outcome: Met   Patient met all goals and has no other OT needs at this time.   KRISTEL Man  8/28/2020

## 2020-08-28 NOTE — ASSESSMENT & PLAN NOTE
Bebo Koroma is a 24 y.o. male IVDU presenting with fevers, left shoulder pain, right knee pain, right ankle pain s/p L shoulder arthroscopy, R knee arthroscopy and R ankle arthrotomy on 8/19/20. R knee and R ankle pain well controlled. L shoulder pain improving    -Trend ESR and CRP daily,    -Follow cultures, ngtd  -PT/OT WBAT in all joints  -Abx, continue vanc/rocephin;  ID rec 4 weeks iv abx;   -DVT asa 81 daily per medicine team  -No plans for additional operative intervention at this time.      Dispo: ltac placement for iv abx

## 2020-08-28 NOTE — PT/OT/SLP PROGRESS
Occupational Therapy   Treatment/Discharge Summary    Name: Bebo Koroma  MRN: [id number]  Admitting Diagnosis:  SIRS (systemic inflammatory response syndrome)  9 Days Post-Op    Recommendations:     Discharge Recommendations: home  Discharge Equipment Recommendations:  none  Barriers to discharge:  None    Assessment:     Bebo Koroma is a 24 y.o. male with a medical diagnosis of SIRS (systemic inflammatory response syndrome). Patient has been ambulating in hallway independently with no AD the past few days. Patient is able to perform ADLs independently at this time also. Patient demonstrated and understands importance of moving L UE and performing UE exercises to improve ROM and strength. Patient has no other OT needs at this level of care. Patient will be discharged from acute OT services.       Plan:     Patient to be seen 2 x/week to address the above listed problems via self-care/home management, therapeutic activities, therapeutic exercises  · Plan of Care Expires: 09/21/20  · Plan of Care Reviewed with: patient    Subjective     Pain/Comfort:  · Pain Rating 1: (patient did not rate)  · Location - Side 1: Left  · Location - Orientation 1: upper  · Location 1: shoulder  · Pain Addressed 1: Reposition, Distraction  · Pain Rating Post-Intervention 1: (patient did not rate)    Objective:     Communicated with: NSG prior to session.  Patient found HOB elevated with telemetry upon OT entry to room.    General Precautions: Standard, fall   Orthopedic Precautions:(WBAT all joints)   Braces:  N/A    Occupational Performance:     Bed Mobility:    · Patient completed Scooting/Bridging with independence  · Patient completed Supine to Sit with independence  · Patient completed Sit to Supine with independence     Functional Mobility/Transfers:  · This therapist has seen patient ambulate independently in hallways on unit with no AD    Activities of Daily Living:  · Lower Body Dressing: independence  Per patient  report    [facility] 6 Click ADL: 25    Treatment & Education:  Role of OT and POC  Safety  ADL retraining  Functional mobility training  Discharge planning    Patient performed L UE ROM exercises in all planes and joints focusing to improve strength and endurance to increase independence with ADLs with independence 1 x 15 and verbalizes understanding of performing them.    Patient left HOB elevated with call button in reach and all needs met. Education:      GOALS:   Multidisciplinary Problems     Occupational Therapy Goals     Not on file          Multidisciplinary Problems (Resolved)        Problem: Occupational Therapy Goal    Goal Priority Disciplines Outcome Interventions   Occupational Therapy Goal   (Resolved)     OT, PT/OT Met    Description: Goals to be met by: 9/4/2020     Patient will increase functional independence with ADLs by performing:    UE Dressing with Set-up Assistance. Met  LE Dressing with Stand-by Assistance. Met  Grooming while standing at sink with Modified Sampson. Met simulating task  Toileting from toilet with Supervision for hygiene and clothing management. Met per patient report  Stand pivot transfers with Modified Sampson. Met  Toilet transfer to toilet with Modified Sampson. Met  Patient will be independent with B UE ROM exercise HEP. Met                     Time Tracking:     OT Date of Treatment: 08/28/20  OT Start Time: 1108  OT Stop Time: 1123  OT Total Time (min): 15 min    Billable Minutes:Therapeutic Exercise 15    KRISTEL Man  8/28/2020

## 2020-08-28 NOTE — PROGRESS NOTES
[facility] Hospital Medicine  Progress Note    Patient Name: Bebo Koroma  MRN: [id number]  Patient Class: IP- Inpatient   Admission Date: 8/19/2020  Length of Stay: 8 days  Attending Physician: Tariq Barrios MD  Primary Care Provider: Primary Doctor [facility] Medicine Team: [facility] 2 Daly Hale MD    Subjective:     Principal Problem:SIRS (systemic inflammatory response syndrome)        HPI:  Mr. Koroma is a 24 year old M with a PMHx of IV drug abuse and depression that presented to the ED complaining of left shoulder pain, right knee pain, and right ankle pain. Pain started when the patient woke up about 3 days ago, mostly left shoulder pain at that time. He then developed right ankle pain and right knee pain. He describes the pain as constant, sharp and worse with movement. Shoulder pain radiates down to the antecubital fossa region. He tried taking Tylenol with no relief. He has experienced significant decrease in ROM of left shoulder and states that he could not walk for 2 days due to his pain. Reports subjective fevers, chills, fatigue, general weakness, night sweats. Denies N/V, chest pain, SOB, abdominal pain, diarrhea, pain with urination. Patient reports that he last used IV opioids about 3 days ago. Injects into the left arm. On Subox-one at home but has not taken in 3 days. Denies alcohol use and other recreational drugs.     Pt arrived to ED with temp of 101.4, tachycardic elevated CRP and ESR. WBC and lactate wnl. Blood cultures done and 1 dose of vanc and zosyn given along with IVF. Xray L shoulder with no significant findings.     Overview/Hospital Course:  Pt admitted to IM 2 for further management of sepsis (source unknown at the time). Ortho consulted to r/o septic arthritis. Xray of right knee and ankle ordered. Psych consulted for management of addiction and medications (on Abilify, Prozac, and Subox-one at home). R knee tapped (31K WBC, 90% segs)  R ankle tapped (600 WBC, 43% seg), L shoulder tapped (clotted). Cultures pending, NGTD. Taken to the OR on 8/19 for arthroscopy of knee and shoulder and arthrotomy of ankle. 8/20 pt in pain, crying, sweating with chills. Subox-one unable to be restarted due to patient being on opioids. Adjusted pain medications for better pain control. Started opioid withdrawal protocol per psych recs. Restarting Abilify and Prozac per psych recs. Patient has had outburst with staff, stating his pain is not being controlled. Acute pain service consulted for further recs. Increased Prozac to 20 mg daily due to patient stating he takes 40 mg at home and his mood swings. Pending LTAC placement at this time.    Interval History: NAEON. Patient awaiting LTAC placement. He would like something for his anxiety. He is currently on Prozac 20 mg. Will discuss with psych about medication adjustments. We also discussed with pt that we will start spacing out his pain medications. We will start by changing dilaudid 2 mg IV to q6h and see how he does.     Review of Systems   Constitutional: Negative for appetite change and fever.   HENT: Negative for congestion, sore throat and trouble swallowing.    Eyes: Negative for photophobia, pain and discharge.   Respiratory: Negative for cough and shortness of breath.    Cardiovascular: Negative for chest pain and palpitations.   Gastrointestinal: Negative for abdominal pain, diarrhea, nausea and vomiting.   Genitourinary: Negative for difficulty urinating.   Musculoskeletal: Positive for arthralgias and joint swelling. Negative for back pain, myalgias and neck pain.   Skin: Negative for pallor and rash.   Neurological: Negative for light-headedness, numbness and headaches.     Objective:     Vital Signs (Most Recent):  Temp: 97.9 °F (36.6 °C) (08/28/20 0536)  Pulse: 95 (08/28/20 0536)  Resp: 20 (08/28/20 0536)  BP: 120/60 (08/28/20 0536)  SpO2: 99 % (08/28/20 0536) Vital Signs (24h Range):  Temp:  [96.4  °F (35.8 °C)-98.4 °F (36.9 °C)] 97.9 °F (36.6 °C)  Pulse:  [] 95  Resp:  [16-20] 20  SpO2:  [96 %-100 %] 99 %  BP: (105-122)/(57-69) 120/60     Weight: 63.5 kg (140 lb)  Body mass index is 21.29 kg/m².  No intake or output data in the 24 hours ending 08/28/20 0600   Physical Exam  Constitutional:       Appearance: Normal appearance.   Eyes:      Conjunctiva/sclera: Conjunctivae normal.      Pupils: Pupils are equal, round, and reactive to light.   Cardiovascular:      Rate and Rhythm: Normal rate and regular rhythm.      Pulses: Normal pulses.      Heart sounds: Normal heart sounds.   Pulmonary:      Effort: Pulmonary effort is normal. No respiratory distress.      Breath sounds: Normal breath sounds.   Abdominal:      General: Bowel sounds are normal. There is no distension.      Palpations: Abdomen is soft.      Tenderness: There is no abdominal tenderness.   Musculoskeletal:         General: Tenderness present.   Skin:     General: Skin is warm.      Findings: No bruising or rash.   Neurological:      Mental Status: He is alert and oriented to person, place, and time.   Psychiatric:         Mood and Affect: Affect is flat.         Behavior: Behavior is agitated.         Significant Labs:   BMP:   Recent Labs   Lab 08/27/20  0410   *   *   K 3.7   CL 99   CO2 29   BUN 23*   CREATININE 0.7   CALCIUM 8.7     CBC:   Recent Labs   Lab 08/27/20  0410   WBC 6.55   HGB 10.6*   HCT 32.8*   *       Significant Imaging: I have reviewed all pertinent imaging results/findings within the past 24 hours.      Assessment/Plan:      * SIRS (systemic inflammatory response syndrome)  24 year old M with a PMHx of IV drug abuse and depression presented to the ED complaining of left shoulder pain, right knee pain, and right ankle pain. On arrival temp 101.4, pulse 111, elevated CRP and Sed rate. WBC wnl. He has significant decreased ROM of left shoulder and swelling of right knee. Infectious workup started in  "the ED. Vanc and zosyn given in the ED.    Xray left shoulder, R knee, R ankle, CXR, and US right lower leg: no significant findings    POD 8: arthroscopy R knee and L shoulder, arthrotomy R ankle -  per ortho op-note "I think this is either a very early case of septic arthritis of multiple joints versus inflammatory poly arthritis. Given his febrile state and IV drug abuse I think he should be treated as though the septic arthritis even if his cultures fail to grow anything."    Plan:   -- Ortho consulted for septic arthritis r/o. Follwing recs  -- Aspiration of joints: R knee (31K WBC, 90% segs) R ankle (600 WBC, 43% segs), L shoulder clotted  -- Fluid aspiration gram stain pending  -- Continue Rocephin and Vanc.  -- ID consulted. Following recs.  -- Blood cultures NGTD  -- HIV negative  -- Hepatitis C antibody positive so will need follow up with Infectious Disease in [facility] as outpatient  -- Procalcitonin 0.67  -- Trend ESR and CRP daily  -- Working on placement for continued antibiotics.         Oligoarthritis of multiple sites - L shoulder, R knee, R ankle  IV drug user presented to the ED complaining of left shoulder pain, right knee pain, and right ankle pain. On arrival temp 101.4, pulse 111, elevated CRP and Sed rate. WBC wnl. He has significant decreased ROM of left shoulder and swelling of right knee. He states his pain has been severe, leading him unable to walk for 2 days.    Xray left shoulder, R knee, R ankle, CXR, and US right lower leg: no significant findings    Plan:   -- Ortho consulted. Following recs. See SIRS  -- Colchicine and Indomethacin d/eduardo  -- Oxycodone 10 mg Q4H PRN  -- Dilaudid 2 mg Q6H PRN  -- Scheduled Tylenol 650 mg TID      IVDU (intravenous drug user)  Pt reports use of IV drugs since the age of 17. States he has consistently used them for 5 years and became sober 6 months ago. He is on Subox-one at home which he last took 3 days ago. He relapsed last week. Last IV " opioid use was 3 days ago. Patient has a 1 week old  at home that could be contributing to the relapse. On exam he was tremulous with mildly dilated pupils. He reports recent sweating and cold chills. He denies N/V diarrhea. With his chronic use, he will likely be hyper-analgesic.     Plan:  -- Clinical Opiate Withdrawal Scale: 4  -- Ativan 0.5 mg D/Morgan  -- Psych consulted for addiction management. Following recs. See opioid dependence.  -- Continue to monitor for signs of withdrawl      Opioid use disorder, severe, dependence  Pt reports IV heroin use since the age of 17. He has consistantly used for the past 5 years. He states he was recently 6 months sober but relapsed 1 week ago. He would like to resume his Subox-one while inpatient, however he must be off opioids for > 20 hours per psych.    Plan:   -- Addiction Psychiatry consulted and following  -- Pain Management Service also consulted for assisstance  -- Clonidine 0.1mg PO q4hr PRN for withdrawal associated HTN  -- Bentyl 10mg PO q6hr PRN for abdominal muscle cramps  -- Loperamide 2mg PO q6hr PRN for diarrhea  -- Robaxin 500mg PO q6hr PRN for muscle spasm  -- Zofran 4mg ODT q8hr PRN for nausea  -- Vistaril 50mg PO qHS PRN for insomnia  -- Tylenol 650 mg TID  -- Elavil 25 mg QHS  -- Abilify 5 mg QD  -- Prozac 20 mg QD  -- Gabapentin 300 mg BID  -- Ibuprofen 800 mg TID with Protonix 40 mg daily    Anxiety and depression  Pt reports hx of anxiety/depression on Abilify and Prozac at home. He states he takes Prozac 40 mg daily and Abilify 20 mg daily which is different than how is documented in the chart. Patient is having mood swings with outbursts due to his pain not being controlled. Some of this may be due to his underlying psych history.     Plan:  -- Psych consulted for medication recs. Following  -- Abilify 5 mg daily  -- Prozac 20 mg daily. Discussed with psych about increasing dose on . Recommend keeping same dose for now.  -- Hydroxyzine Q8H  PRN for anxiety and insomnia    Opioid withdrawal  See Opioid use disorder, severe, dependence      Septic arthritis of right ankle  See SIRS (systemic inflammatory response syndrome)      Septic arthritis of shoulder, left  See SIRS (systemic inflammatory response syndrome)      Septic arthritis of knee, right  See SIRS (systemic inflammatory response syndrome)      Elevated C-reactive protein (CRP)  See SIRS (systemic inflammatory response syndrome)      Elevated erythrocyte sedimentation rate  See SIRS (systemic inflammatory response syndrome)        VTE Risk Mitigation (From admission, onward)         Ordered     Place sequential compression device  Until discontinued      08/19/20 1643     IP VTE LOW RISK PATIENT  Once      08/19/20 1643                Discharge Planning   TITA: 8/28/2020     Code Status: Full Code   Is the patient medically ready for discharge?: Yes    Reason for patient still in hospital (select all that apply): Treatment and Other (specify) Awaiting LTAC Placement  Discharge Plan A: Long-term acute care facility (LTAC)   Discharge Delays: (!) Patient and Family Barriers              Daly Hale MD  Department of Hospital Medicine   [facility]

## 2020-08-28 NOTE — PROGRESS NOTES
Pharmacokinetic Assessment Follow Up: IV Vancomycin    Vancomycin serum concentration assessment(s):    The trough level was drawn correctly and can be used to guide therapy at this time. The measurement is below the desired definitive target range of 15 to 20 mcg/mL.   Patient is tough to get in goal, attempting q12h to see if that might get him within range.    Vancomycin Regimen Plan:    Change regimen to Vancomycin 2,000 mg mg IV every 12  hours with next serum trough concentration measured at 0900 on 8/30.    Drug levels (last 3 results):  Recent Labs   Lab Result Units 08/25/20  1648 08/27/20  0853 08/28/20  1211   Vancomycin, Random ug/mL 14.3  --   --    Vancomycin-Trough ug/mL  --  21.4 13.5       Pharmacy will continue to follow and monitor vancomycin.    Please contact pharmacy at extension [phone] for questions regarding this assessment.    Thank you for the consult,   Ksenia Staley PharmD       Patient brief summary:  Bebo Koroma is a 24 y.o. male initiated on antimicrobial therapy with IV Vancomycin for treatment of bone/joint infection      Drug Allergies:   Review of patient's allergies indicates:   Allergen Reactions    Penicillins Hives     Unclear of last episode of allergic reaction  Tolerated ceftriaxone 08/2020       Actual Body Weight:   63.5 kg    Renal Function:   Estimated Creatinine Clearance: 147.4 mL/min (based on SCr of 0.7 mg/dL).,     Dialysis Method (if applicable):  N/A    CBC (last 72 hours):  Recent Labs   Lab Result Units 08/26/20  0303 08/27/20  0410 08/28/20  0825   WBC K/uL 7.43 6.55 6.16   Hemoglobin g/dL 10.9* 10.6* 11.5*   Hematocrit % 34.7* 32.8* 37.3*   Platelets K/uL 410* 442* 491*   Gran% % 48.3 45.1 44.1   Lymph% % 34.3 36.6 41.6   Mono% % 11.3 11.1 9.7   Eosinophil% % 4.8 5.3 3.2   Basophil% % 0.5 0.8 0.8   Differential Method  Automated Automated Automated       Metabolic Panel (last 72 hours):  Recent Labs   Lab Result Units 08/26/20  0303 08/27/20  0410  08/28/20  0825   Sodium mmol/L 138 134* 138   Potassium mmol/L 4.2 3.7 4.3   Chloride mmol/L 102 99 102   CO2 mmol/L 28 29 29   Glucose mg/dL 86 111* 94   BUN, Bld mg/dL 20 23* 16   Creatinine mg/dL 0.7 0.7 0.7   Albumin g/dL 3.0* 2.9* 3.4*   Total Bilirubin mg/dL 0.3 0.2 0.3   Alkaline Phosphatase U/L 59 58 66   AST U/L 10 13 11   ALT U/L 10 8* 11       Vancomycin Administrations:  vancomycin given in the last 96 hours                   vancomycin 1.25 g in dextrose 5% 250 mL IVPB (ready to mix) (mg) 1,250 mg New Bag 08/28/20 1426     1,250 mg New Bag  0536     1,250 mg New Bag 08/27/20 2230     1,250 mg New Bag  1249    vancomycin 1.5 g in dextrose 5 % 250 mL IVPB (ready to mix) (mg) 1,500 mg New Bag 08/27/20 0209     1,500 mg New Bag 08/26/20 1749     1,500 mg New Bag  0718    vancomycin 1.5 g in dextrose 5 % 250 mL IVPB (ready to mix) (mg) 1,500 mg New Bag 08/25/20 2327    vancomycin 1.5 g in dextrose 5 % 250 mL IVPB (ready to mix) (mg) 1,500 mg New Bag 08/25/20 0642     1,500 mg New Bag 08/24/20 2200                Microbiologic Results:  Microbiology Results (last 7 days)     Procedure Component Value Units Date/Time    Culture, Anaerobic [[id number]] Collected: 08/19/20 1805    Order Status: Completed Specimen: Joint Fluid from Shoulder, Left Updated: 08/27/20 0734     Anaerobic Culture No anaerobes isolated    Culture, Anaerobic [[id number]] Collected: 08/19/20 1802    Order Status: Completed Specimen: Joint Fluid from Knee, Right Updated: 08/27/20 0734     Anaerobic Culture No anaerobes isolated    Culture, Anaerobic [[id number]] Collected: 08/19/20 1804    Order Status: Completed Specimen: Joint Fluid from Ankle, Right Updated: 08/27/20 0734     Anaerobic Culture No anaerobes isolated    Fungus culture [[id number]] Collected: 08/19/20 1802    Order Status: Completed Specimen: Joint Fluid from Knee, Right Updated: 08/25/20 1210     Fungus (Mycology) Culture Culture in progress    Fungus culture [[id number]]  Collected: 08/19/20 1805    Order Status: Completed Specimen: Joint Fluid from Shoulder, Left Updated: 08/25/20 1210     Fungus (Mycology) Culture Culture in progress    Fungus culture [[id number]] Collected: 08/19/20 1804    Order Status: Completed Specimen: Joint Fluid from Ankle, Right Updated: 08/25/20 1210     Fungus (Mycology) Culture Culture in progress    Blood culture [[id number]] Collected: 08/19/20 1644    Order Status: Completed Specimen: Blood from Peripheral, Antecubital, Right Updated: 08/24/20 1822     Blood Culture, Routine No growth after 5 days.    Blood culture x two cultures. Draw prior to antibiotics. [[id number]] Collected: 08/19/20 1140    Order Status: Completed Specimen: Blood from Peripheral, Antecubital, Right Updated: 08/24/20 1412     Blood Culture, Routine No growth after 5 days.    Narrative:      Aerobic and anaerobic    Blood culture x two cultures. Draw prior to antibiotics. [[id number]] Collected: 08/19/20 1141    Order Status: Completed Specimen: Blood from Peripheral, Forearm, Right Updated: 08/24/20 1412     Blood Culture, Routine No growth after 5 days.    Narrative:      Aerobic and anaerobic    Chlamydia/Neisseria gonorrhoeae RNA, TMA [[id number]] Collected: 08/19/20 1140    Order Status: Completed Updated: 08/22/20 1022     Neisseria gonorrhoeae RNA, TMA Negative     Comment: Chlamydia trachomatis and Neisseria gonorrhoeae by TMA is   negative, therefore no further testing added.  INTERPRETIVE INFORMATION: CT/NG TMA with Rfx to Confirmation  This test is intended for medical purposes only. It is not   intended for the evaluation of suspected sexual abuse or   for other medicolegal indications. Refer to the most recent   CDC recommendations for patients in whom a false positive   result may have adverse psychosocial impact.  Positive results will be confirmed with alternative nucleic   acid target assay.          Chlamydia trachomatis, RNA, TMA Negative     Comment: Performed By:  [address] [address]  : Francois Shell MD, MS         Aerobic culture [[id number]] Collected: 08/19/20 1802    Order Status: Completed Specimen: Joint Fluid from Knee, Right Updated: 08/22/20 0951     Aerobic Bacterial Culture No growth    Aerobic culture [[id number]] Collected: 08/19/20 1805    Order Status: Completed Specimen: Joint Fluid from Shoulder, Left Updated: 08/22/20 0951     Aerobic Bacterial Culture No growth    Aerobic culture [[id number]] Collected: 08/19/20 1804    Order Status: Completed Specimen: Joint Fluid from Ankle, Right Updated: 08/22/20 0951     Aerobic Bacterial Culture No growth

## 2020-08-28 NOTE — PROGRESS NOTES
[facility]  Orthopedics  Progress Note    Patient Name: Bebo Koroma  MRN: [id number]  Admission Date: 8/19/2020  Hospital Length of Stay: 8 days  Attending Provider: Tariq Barrios MD  Primary Care Provider: Primary Doctor No  Follow-up For: Procedure(s) (LRB):  ARTHROSCOPY, KNEE - cultures & 9 liters saline - arthroscopic leg lozada  (Right)  ARTHROSCOPY, SHOULDER - cultures & 9 liters saline (Left)  ARTHROTOMY, ANKLE - cultures & 9 liters of saline - cysto tubing (Right)    Post-Operative Day: 9 Days Post-Op  Subjective:     Principal Problem:SIRS (systemic inflammatory response syndrome)    Principal Orthopedic Problem: s/p L shoulder arthroscopy, R knee arthroscopy and R ankle arthrotomy on 8/19/20    Interval History: The patient was seen and examined at the bedside. NAEON. Tolerating PO intake. VSS. Denies R knee and R ankle pain. L shoulder pain improving further today. CRP trending down to 46 from 63. WBC stable.       Review of patient's allergies indicates:   Allergen Reactions    Penicillins Hives     Unclear of last episode of allergic reaction  Tolerated ceftriaxone 08/2020       Current Facility-Administered Medications   Medication    acetaminophen tablet 650 mg    amitriptyline tablet 25 mg    ARIPiprazole tablet 5 mg    aspirin EC tablet 81 mg    cefTRIAXone (ROCEPHIN) 2 g/50 mL D5W IVPB    cloNIDine tablet 0.1 mg    dextrose 50% injection 12.5 g    dextrose 50% injection 25 g    dicyclomine capsule 10 mg    FLUoxetine capsule 20 mg    gabapentin capsule 300 mg    glucagon (human recombinant) injection 1 mg    glucose chewable tablet 16 g    glucose chewable tablet 24 g    HYDROmorphone injection 2 mg    hydrOXYzine pamoate capsule 50 mg    ibuprofen tablet 800 mg    loperamide capsule 2 mg    LORazepam tablet 0.5 mg    melatonin tablet 6 mg    methocarbamoL tablet 750 mg    naloxone 0.4 mg/mL injection 0.4 mg    nicotine 21 mg/24 hr 1 patch     "ondansetron disintegrating tablet 8 mg    oxyCODONE immediate release tablet Tab 10 mg    pantoprazole EC tablet 40 mg    prochlorperazine tablet 10 mg    senna-docusate 8.6-50 mg per tablet 1 tablet    sodium chloride 0.9% flush 10 mL    vancomycin 1.25 g in dextrose 5% 250 mL IVPB (ready to mix)     Objective:     Vital Signs (Most Recent):  Temp: 97.5 °F (36.4 °C) (08/28/20 0717)  Pulse: 86 (08/28/20 0717)  Resp: 18 (08/28/20 0858)  BP: 100/67 (08/28/20 0717)  SpO2: 98 % (08/28/20 0717) Vital Signs (24h Range):  Temp:  [96.4 °F (35.8 °C)-98.4 °F (36.9 °C)] 97.5 °F (36.4 °C)  Pulse:  [69-98] 86  Resp:  [16-20] 18  SpO2:  [96 %-99 %] 98 %  BP: (100-120)/(57-67) 100/67     Weight: 63.5 kg (140 lb)  Height: 5' 8" (172.7 cm)  Body mass index is 21.29 kg/m².    No intake or output data in the 24 hours ending 08/28/20 0951    Ortho/SPM Exam     LUE  Dressings c,d,i  Pain with shoulder ROM improving  SILT M/U/R   Motor intact AIN/PIN/M/U/R   Cap refill < 2s  2+ RP    RLE:  Dressings to ankle and knee c,d,i  Soreness with ankle and knee passive  SILT Sa/Bell/DP/SP/T  Motor intact EHL/FHL/TA/Gastroc  2+ DP, 2+ PT          Significant Labs:   CBC:   Recent Labs   Lab 08/27/20 0410 08/28/20  0825   WBC 6.55 6.16   HGB 10.6* 11.5*   HCT 32.8* 37.3*   * 491*     CMP:   Recent Labs   Lab 08/27/20 0410 08/28/20  0825   * 138   K 3.7 4.3   CL 99 102   CO2 29 29   * 94   BUN 23* 16   CREATININE 0.7 0.7   CALCIUM 8.7 9.6   PROT 7.1 8.1   ALBUMIN 2.9* 3.4*   BILITOT 0.2 0.3   ALKPHOS 58 66   AST 13 11   ALT 8* 11   ANIONGAP 6* 7*   EGFRNONAA >60.0 >60.0     CRP:   Recent Labs   Lab 08/27/20  0410 08/28/20  0825   CRP 24.9* 14.2*     All pertinent labs within the past 24 hours have been reviewed.    Significant Imaging: I have reviewed all pertinent imaging results/findings.    Assessment/Plan:     Septic arthritis of knee, right  Bebo Koroma is a 24 y.o. male IVDU presenting with fevers, left shoulder " pain, right knee pain, right ankle pain s/p L shoulder arthroscopy, R knee arthroscopy and R ankle arthrotomy on 8/19/20. R knee and R ankle pain well controlled. L shoulder pain improving    -Trend ESR and CRP daily,    -Follow cultures, ngtd  -PT/OT WBAT in all joints  -Abx, continue vanc/rocephin;  ID rec 4 weeks iv abx;   -DVT asa 81 daily per medicine team  -No plans for additional operative intervention at this time.      Dispo: ltac placement for iv abx          Tian Bucio MD  Orthopedics  [facility]

## 2020-08-28 NOTE — SUBJECTIVE & OBJECTIVE
Principal Problem:SIRS (systemic inflammatory response syndrome)    Principal Orthopedic Problem: s/p L shoulder arthroscopy, R knee arthroscopy and R ankle arthrotomy on 8/19/20    Interval History: The patient was seen and examined at the bedside. NAEON. Tolerating PO intake. VSS. Denies R knee and R ankle pain. L shoulder pain improving further today. CRP trending down to 46 from 63. WBC stable.       Review of patient's allergies indicates:   Allergen Reactions    Penicillins Hives     Unclear of last episode of allergic reaction  Tolerated ceftriaxone 08/2020       Current Facility-Administered Medications   Medication    acetaminophen tablet 650 mg    amitriptyline tablet 25 mg    ARIPiprazole tablet 5 mg    aspirin EC tablet 81 mg    cefTRIAXone (ROCEPHIN) 2 g/50 mL D5W IVPB    cloNIDine tablet 0.1 mg    dextrose 50% injection 12.5 g    dextrose 50% injection 25 g    dicyclomine capsule 10 mg    FLUoxetine capsule 20 mg    gabapentin capsule 300 mg    glucagon (human recombinant) injection 1 mg    glucose chewable tablet 16 g    glucose chewable tablet 24 g    HYDROmorphone injection 2 mg    hydrOXYzine pamoate capsule 50 mg    ibuprofen tablet 800 mg    loperamide capsule 2 mg    LORazepam tablet 0.5 mg    melatonin tablet 6 mg    methocarbamoL tablet 750 mg    naloxone 0.4 mg/mL injection 0.4 mg    nicotine 21 mg/24 hr 1 patch    ondansetron disintegrating tablet 8 mg    oxyCODONE immediate release tablet Tab 10 mg    pantoprazole EC tablet 40 mg    prochlorperazine tablet 10 mg    senna-docusate 8.6-50 mg per tablet 1 tablet    sodium chloride 0.9% flush 10 mL    vancomycin 1.25 g in dextrose 5% 250 mL IVPB (ready to mix)     Objective:     Vital Signs (Most Recent):  Temp: 97.5 °F (36.4 °C) (08/28/20 0717)  Pulse: 86 (08/28/20 0717)  Resp: 18 (08/28/20 0858)  BP: 100/67 (08/28/20 0717)  SpO2: 98 % (08/28/20 0717) Vital Signs (24h Range):  Temp:  [96.4 °F (35.8 °C)-98.4 °F  "(36.9 °C)] 97.5 °F (36.4 °C)  Pulse:  [69-98] 86  Resp:  [16-20] 18  SpO2:  [96 %-99 %] 98 %  BP: (100-120)/(57-67) 100/67     Weight: 63.5 kg (140 lb)  Height: 5' 8" (172.7 cm)  Body mass index is 21.29 kg/m².    No intake or output data in the 24 hours ending 08/28/20 0951    Ortho/SPM Exam     LUE  Dressings c,d,i  Pain with shoulder ROM improving  SILT M/U/R   Motor intact AIN/PIN/M/U/R   Cap refill < 2s  2+ RP    RLE:  Dressings to ankle and knee c,d,i  Soreness with ankle and knee passive  SILT Sa/Bell/DP/SP/T  Motor intact EHL/FHL/TA/Gastroc  2+ DP, 2+ PT          Significant Labs:   CBC:   Recent Labs   Lab 08/27/20  0410 08/28/20  0825   WBC 6.55 6.16   HGB 10.6* 11.5*   HCT 32.8* 37.3*   * 491*     CMP:   Recent Labs   Lab 08/27/20  0410 08/28/20  0825   * 138   K 3.7 4.3   CL 99 102   CO2 29 29   * 94   BUN 23* 16   CREATININE 0.7 0.7   CALCIUM 8.7 9.6   PROT 7.1 8.1   ALBUMIN 2.9* 3.4*   BILITOT 0.2 0.3   ALKPHOS 58 66   AST 13 11   ALT 8* 11   ANIONGAP 6* 7*   EGFRNONAA >60.0 >60.0     CRP:   Recent Labs   Lab 08/27/20  0410 08/28/20  0825   CRP 24.9* 14.2*     All pertinent labs within the past 24 hours have been reviewed.    Significant Imaging: I have reviewed all pertinent imaging results/findings.  "

## 2020-08-28 NOTE — PLAN OF CARE
VALERIE contacted [facility] and [facility]. VALERIE spoke with admissions dept. VALERIE waiting on final decision.     3:11 PM  SW sent messages via [facility] to the remaining LTACs reviewing the patient. VALERIE waiting to hear back.    Nany Rodriguez, RAMONA  [facility]

## 2020-08-28 NOTE — ASSESSMENT & PLAN NOTE
Pt reports use of IV drugs since the age of 17. States he has consistently used them for 5 years and became sober 6 months ago. He is on Subox-one at home which he last took 3 days ago. He relapsed last week. Last IV opioid use was 3 days ago. Patient has a 1 week old  at home that could be contributing to the relapse. On exam he was tremulous with mildly dilated pupils. He reports recent sweating and cold chills. He denies N/V diarrhea. With his chronic use, he will likely be hyper-analgesic.     Plan:  -- Clinical Opiate Withdrawal Scale: 4  -- Ativan 0.5 mg D/Morgan  -- Psych consulted for addiction management. Following recs. See opioid dependence.  -- Continue to monitor for signs of withdrawl

## 2020-08-28 NOTE — ASSESSMENT & PLAN NOTE
IV drug user presented to the ED complaining of left shoulder pain, right knee pain, and right ankle pain. On arrival temp 101.4, pulse 111, elevated CRP and Sed rate. WBC wnl. He has significant decreased ROM of left shoulder and swelling of right knee. He states his pain has been severe, leading him unable to walk for 2 days.    Xray left shoulder, R knee, R ankle, CXR, and US right lower leg: no significant findings    Plan:   -- Ortho consulted. Following recs. See SIRS  -- Colchicine and Indomethacin d/eduardo  -- Oxycodone 10 mg Q4H PRN  -- Dilaudid 2 mg Q6H PRN  -- Scheduled Tylenol 650 mg TID

## 2020-08-28 NOTE — SUBJECTIVE & OBJECTIVE
Interval History: NAEON. Patient awaiting LTAC placement. He would like something for his anxiety. He is currently on Prozac 20 mg. Will discuss with psych about medication adjustments. We also discussed with pt that we will start spacing out his pain medications. We will start by changing dilaudid 2 mg IV to q6h and see how he does.     Review of Systems   Constitutional: Negative for appetite change and fever.   HENT: Negative for congestion, sore throat and trouble swallowing.    Eyes: Negative for photophobia, pain and discharge.   Respiratory: Negative for cough and shortness of breath.    Cardiovascular: Negative for chest pain and palpitations.   Gastrointestinal: Negative for abdominal pain, diarrhea, nausea and vomiting.   Genitourinary: Negative for difficulty urinating.   Musculoskeletal: Positive for arthralgias and joint swelling. Negative for back pain, myalgias and neck pain.   Skin: Negative for pallor and rash.   Neurological: Negative for light-headedness, numbness and headaches.     Objective:     Vital Signs (Most Recent):  Temp: 97.9 °F (36.6 °C) (08/28/20 0536)  Pulse: 95 (08/28/20 0536)  Resp: 20 (08/28/20 0536)  BP: 120/60 (08/28/20 0536)  SpO2: 99 % (08/28/20 0536) Vital Signs (24h Range):  Temp:  [96.4 °F (35.8 °C)-98.4 °F (36.9 °C)] 97.9 °F (36.6 °C)  Pulse:  [] 95  Resp:  [16-20] 20  SpO2:  [96 %-100 %] 99 %  BP: (105-122)/(57-69) 120/60     Weight: 63.5 kg (140 lb)  Body mass index is 21.29 kg/m².  No intake or output data in the 24 hours ending 08/28/20 0600   Physical Exam  Constitutional:       Appearance: Normal appearance.   Eyes:      Conjunctiva/sclera: Conjunctivae normal.      Pupils: Pupils are equal, round, and reactive to light.   Cardiovascular:      Rate and Rhythm: Normal rate and regular rhythm.      Pulses: Normal pulses.      Heart sounds: Normal heart sounds.   Pulmonary:      Effort: Pulmonary effort is normal. No respiratory distress.      Breath sounds: Normal  breath sounds.   Abdominal:      General: Bowel sounds are normal. There is no distension.      Palpations: Abdomen is soft.      Tenderness: There is no abdominal tenderness.   Musculoskeletal:         General: Tenderness present.   Skin:     General: Skin is warm.      Findings: No bruising or rash.   Neurological:      Mental Status: He is alert and oriented to person, place, and time.   Psychiatric:         Mood and Affect: Affect is flat.         Behavior: Behavior is agitated.         Significant Labs:   BMP:   Recent Labs   Lab 08/27/20  0410   *   *   K 3.7   CL 99   CO2 29   BUN 23*   CREATININE 0.7   CALCIUM 8.7     CBC:   Recent Labs   Lab 08/27/20  0410   WBC 6.55   HGB 10.6*   HCT 32.8*   *       Significant Imaging: I have reviewed all pertinent imaging results/findings within the past 24 hours.

## 2020-08-28 NOTE — ASSESSMENT & PLAN NOTE
"24 year old M with a PMHx of IV drug abuse and depression presented to the ED complaining of left shoulder pain, right knee pain, and right ankle pain. On arrival temp 101.4, pulse 111, elevated CRP and Sed rate. WBC wnl. He has significant decreased ROM of left shoulder and swelling of right knee. Infectious workup started in the ED. Vanc and zosyn given in the ED.    Xray left shoulder, R knee, R ankle, CXR, and US right lower leg: no significant findings    POD 8: arthroscopy R knee and L shoulder, arthrotomy R ankle -  per ortho op-note "I think this is either a very early case of septic arthritis of multiple joints versus inflammatory poly arthritis. Given his febrile state and IV drug abuse I think he should be treated as though the septic arthritis even if his cultures fail to grow anything."    Plan:   -- Ortho consulted for septic arthritis r/o. Follwing recs  -- Aspiration of joints: R knee (31K WBC, 90% segs) R ankle (600 WBC, 43% segs), L shoulder clotted  -- Fluid aspiration gram stain pending  -- Continue Rocephin and Vanc.  -- ID consulted. Following recs.  -- Blood cultures NGTD  -- HIV negative  -- Hepatitis C antibody positive so will need follow up with [facility]ious Disease in [facility] as outpatient  -- Procalcitonin 0.67  -- Trend ESR and CRP daily  -- Working on placement for continued antibiotics.       "

## 2020-08-28 NOTE — ASSESSMENT & PLAN NOTE
Pt reports hx of anxiety/depression on Abilify and Prozac at home. He states he takes Prozac 40 mg daily and Abilify 20 mg daily which is different than how is documented in the chart. Patient is having mood swings with outbursts due to his pain not being controlled. Some of this may be due to his underlying psych history.     Plan:  -- Psych consulted for medication recs. Following  -- Abilify 5 mg daily  -- Prozac 20 mg daily. Discussed with psych about increasing dose on 8/28. Recommend keeping same dose for now.  -- Hydroxyzine Q8H PRN for anxiety and insomnia

## 2020-08-29 LAB
ALBUMIN SERPL BCP-MCNC: 3.3 G/DL (ref 3.5–5.2)
ALP SERPL-CCNC: 64 U/L (ref 55–135)
ALT SERPL W/O P-5'-P-CCNC: 9 U/L (ref 10–44)
ANION GAP SERPL CALC-SCNC: 8 MMOL/L (ref 8–16)
AST SERPL-CCNC: 11 U/L (ref 10–40)
BASOPHILS # BLD AUTO: 0.06 K/UL (ref 0–0.2)
BASOPHILS NFR BLD: 1 % (ref 0–1.9)
BILIRUB SERPL-MCNC: 0.3 MG/DL (ref 0.1–1)
BUN SERPL-MCNC: 11 MG/DL (ref 6–20)
CALCIUM SERPL-MCNC: 9.3 MG/DL (ref 8.7–10.5)
CHLORIDE SERPL-SCNC: 104 MMOL/L (ref 95–110)
CO2 SERPL-SCNC: 27 MMOL/L (ref 23–29)
CREAT SERPL-MCNC: 0.7 MG/DL (ref 0.5–1.4)
CRP SERPL-MCNC: 9.1 MG/L (ref 0–8.2)
DIFFERENTIAL METHOD: ABNORMAL
EOSINOPHIL # BLD AUTO: 0.2 K/UL (ref 0–0.5)
EOSINOPHIL NFR BLD: 3.8 % (ref 0–8)
ERYTHROCYTE [DISTWIDTH] IN BLOOD BY AUTOMATED COUNT: 12.8 % (ref 11.5–14.5)
ERYTHROCYTE [SEDIMENTATION RATE] IN BLOOD BY WESTERGREN METHOD: 80 MM/HR (ref 0–23)
EST. GFR  (AFRICAN AMERICAN): >60 ML/MIN/1.73 M^2
EST. GFR  (NON AFRICAN AMERICAN): >60 ML/MIN/1.73 M^2
GLUCOSE SERPL-MCNC: 104 MG/DL (ref 70–110)
HCT VFR BLD AUTO: 34.9 % (ref 40–54)
HGB BLD-MCNC: 11.3 G/DL (ref 14–18)
IMM GRANULOCYTES # BLD AUTO: 0.02 K/UL (ref 0–0.04)
IMM GRANULOCYTES NFR BLD AUTO: 0.3 % (ref 0–0.5)
LYMPHOCYTES # BLD AUTO: 1.3 K/UL (ref 1–4.8)
LYMPHOCYTES NFR BLD: 21.9 % (ref 18–48)
MCH RBC QN AUTO: 30.9 PG (ref 27–31)
MCHC RBC AUTO-ENTMCNC: 32.4 G/DL (ref 32–36)
MCV RBC AUTO: 95 FL (ref 82–98)
MONOCYTES # BLD AUTO: 0.8 K/UL (ref 0.3–1)
MONOCYTES NFR BLD: 13 % (ref 4–15)
NEUTROPHILS # BLD AUTO: 3.6 K/UL (ref 1.8–7.7)
NEUTROPHILS NFR BLD: 60 % (ref 38–73)
NRBC BLD-RTO: 0 /100 WBC
PLATELET # BLD AUTO: 500 K/UL (ref 150–350)
PMV BLD AUTO: 10.1 FL (ref 9.2–12.9)
POTASSIUM SERPL-SCNC: 4.1 MMOL/L (ref 3.5–5.1)
PROT SERPL-MCNC: 7.5 G/DL (ref 6–8.4)
RBC # BLD AUTO: 3.66 M/UL (ref 4.6–6.2)
SODIUM SERPL-SCNC: 139 MMOL/L (ref 136–145)
WBC # BLD AUTO: 5.99 K/UL (ref 3.9–12.7)

## 2020-08-29 PROCEDURE — 63600175 PHARM REV CODE 636 W HCPCS: Performed by: STUDENT IN AN ORGANIZED HEALTH CARE EDUCATION/TRAINING PROGRAM

## 2020-08-29 PROCEDURE — 25000003 PHARM REV CODE 250: Performed by: ORTHOPAEDIC SURGERY

## 2020-08-29 PROCEDURE — 36415 COLL VENOUS BLD VENIPUNCTURE: CPT

## 2020-08-29 PROCEDURE — 25000003 PHARM REV CODE 250: Performed by: INTERNAL MEDICINE

## 2020-08-29 PROCEDURE — 25000003 PHARM REV CODE 250: Performed by: STUDENT IN AN ORGANIZED HEALTH CARE EDUCATION/TRAINING PROGRAM

## 2020-08-29 PROCEDURE — 11000001 HC ACUTE MED/SURG PRIVATE ROOM

## 2020-08-29 PROCEDURE — 85652 RBC SED RATE AUTOMATED: CPT

## 2020-08-29 PROCEDURE — 94761 N-INVAS EAR/PLS OXIMETRY MLT: CPT

## 2020-08-29 PROCEDURE — S4991 NICOTINE PATCH NONLEGEND: HCPCS | Performed by: STUDENT IN AN ORGANIZED HEALTH CARE EDUCATION/TRAINING PROGRAM

## 2020-08-29 PROCEDURE — 85025 COMPLETE CBC W/AUTO DIFF WBC: CPT

## 2020-08-29 PROCEDURE — 80053 COMPREHEN METABOLIC PANEL: CPT

## 2020-08-29 PROCEDURE — 99233 SBSQ HOSP IP/OBS HIGH 50: CPT | Mod: ,,, | Performed by: STUDENT IN AN ORGANIZED HEALTH CARE EDUCATION/TRAINING PROGRAM

## 2020-08-29 PROCEDURE — 99233 PR SUBSEQUENT HOSPITAL CARE,LEVL III: ICD-10-PCS | Mod: ,,, | Performed by: STUDENT IN AN ORGANIZED HEALTH CARE EDUCATION/TRAINING PROGRAM

## 2020-08-29 PROCEDURE — 86140 C-REACTIVE PROTEIN: CPT

## 2020-08-29 RX ORDER — LIDOCAINE 50 MG/G
1 PATCH TOPICAL
Status: DISCONTINUED | OUTPATIENT
Start: 2020-08-29 | End: 2020-09-01 | Stop reason: HOSPADM

## 2020-08-29 RX ADMIN — VANCOMYCIN HYDROCHLORIDE 2000 MG: 100 INJECTION, POWDER, LYOPHILIZED, FOR SOLUTION INTRAVENOUS at 10:08

## 2020-08-29 RX ADMIN — OXYCODONE HYDROCHLORIDE 10 MG: 10 TABLET ORAL at 03:08

## 2020-08-29 RX ADMIN — METHOCARBAMOL TABLETS 750 MG: 750 TABLET, COATED ORAL at 05:08

## 2020-08-29 RX ADMIN — NICOTINE 1 PATCH: 21 PATCH, EXTENDED RELEASE TRANSDERMAL at 09:08

## 2020-08-29 RX ADMIN — ACETAMINOPHEN 650 MG: 325 TABLET ORAL at 09:08

## 2020-08-29 RX ADMIN — IBUPROFEN 800 MG: 400 TABLET, FILM COATED ORAL at 08:08

## 2020-08-29 RX ADMIN — CEFTRIAXONE 2 G: 2 INJECTION, SOLUTION INTRAVENOUS at 09:08

## 2020-08-29 RX ADMIN — IBUPROFEN 800 MG: 400 TABLET, FILM COATED ORAL at 09:08

## 2020-08-29 RX ADMIN — HYDROMORPHONE HYDROCHLORIDE 2 MG: 1 INJECTION, SOLUTION INTRAMUSCULAR; INTRAVENOUS; SUBCUTANEOUS at 04:08

## 2020-08-29 RX ADMIN — HYDROMORPHONE HYDROCHLORIDE 2 MG: 1 INJECTION, SOLUTION INTRAMUSCULAR; INTRAVENOUS; SUBCUTANEOUS at 10:08

## 2020-08-29 RX ADMIN — GABAPENTIN 300 MG: 300 CAPSULE ORAL at 09:08

## 2020-08-29 RX ADMIN — PANTOPRAZOLE SODIUM 40 MG: 40 TABLET, DELAYED RELEASE ORAL at 09:08

## 2020-08-29 RX ADMIN — ASPIRIN 81 MG: 81 TABLET, COATED ORAL at 09:08

## 2020-08-29 RX ADMIN — AMITRIPTYLINE HYDROCHLORIDE 25 MG: 25 TABLET, FILM COATED ORAL at 08:08

## 2020-08-29 RX ADMIN — ACETAMINOPHEN 650 MG: 325 TABLET ORAL at 08:08

## 2020-08-29 RX ADMIN — GABAPENTIN 300 MG: 300 CAPSULE ORAL at 08:08

## 2020-08-29 RX ADMIN — OXYCODONE HYDROCHLORIDE 10 MG: 10 TABLET ORAL at 07:08

## 2020-08-29 RX ADMIN — FLUOXETINE 20 MG: 20 CAPSULE ORAL at 09:08

## 2020-08-29 RX ADMIN — ARIPIPRAZOLE 5 MG: 5 TABLET ORAL at 09:08

## 2020-08-29 NOTE — PROGRESS NOTES
[facility] Hospital Medicine  Progress Note    Patient Name: Bebo Koroma  MRN: [id number]  Patient Class: IP- Inpatient   Admission Date: 8/19/2020  Length of Stay: 9 days  Attending Physician: Tariq Barrios MD  Primary Care Provider: Primary Doctor [facility] Medicine Team: [facility] [place] [facility] MED 2 Daly Hale MD    Subjective:     Principal Problem:SIRS (systemic inflammatory response syndrome)        HPI:  Mr. Koroma is a 24 year old M with a PMHx of IV drug abuse and depression that presented to the ED complaining of left shoulder pain, right knee pain, and right ankle pain. Pain started when the patient woke up about 3 days ago, mostly left shoulder pain at that time. He then developed right ankle pain and right knee pain. He describes the pain as constant, sharp and worse with movement. Shoulder pain radiates down to the antecubital fossa region. He tried taking Tylenol with no relief. He has experienced significant decrease in ROM of left shoulder and states that he could not walk for 2 days due to his pain. Reports subjective fevers, chills, fatigue, general weakness, night sweats. Denies N/V, chest pain, SOB, abdominal pain, diarrhea, pain with urination. Patient reports that he last used IV opioids about 3 days ago. Injects into the left arm. On Subox-one at home but has not taken in 3 days. Denies alcohol use and other recreational drugs.     Pt arrived to ED with temp of 101.4, tachycardic elevated CRP and ESR. WBC and lactate wnl. Blood cultures done and 1 dose of vanc and zosyn given along with IVF. Xray L shoulder with no significant findings.     Overview/Hospital Course:  Pt admitted to IM 2 for further management of sepsis (source unknown at the time). Ortho consulted to r/o septic arthritis. Xray of right knee and ankle ordered. Psych consulted for management of addiction and medications (on Abilify, Prozac, and Subox-one at home). R knee tapped (31K WBC, 90% segs)  R ankle tapped (600 WBC, 43% seg), L shoulder tapped (clotted). Cultures pending, NGTD. Taken to the OR on 8/19 for arthroscopy of knee and shoulder and arthrotomy of ankle. 8/20 pt in pain, crying, sweating with chills. Subox-one unable to be restarted due to patient being on opioids. Adjusted pain medications for better pain control. Started opioid withdrawal protocol per psych recs. Restarting Abilify and Prozac per psych recs. Patient has had outburst with staff, stating his pain is not being controlled. Acute pain service consulted for further recs. Increased Prozac to 20 mg daily due to patient stating he takes 40 mg at home and his mood swings. Pending LTAC placement at this time.    Interval History: NAEON. Patient awaiting LTAC placement. He was sleeping this AM on examination. Pain reported to be controlled after decreasing Dilaudid to Q6H.     Review of Systems   Constitutional: Negative for appetite change and fever.   HENT: Negative for congestion, sore throat and trouble swallowing.    Eyes: Negative for photophobia, pain and discharge.   Respiratory: Negative for cough and shortness of breath.    Cardiovascular: Negative for chest pain and palpitations.   Gastrointestinal: Negative for abdominal pain, diarrhea, nausea and vomiting.   Genitourinary: Negative for difficulty urinating.   Musculoskeletal: Positive for arthralgias and joint swelling. Negative for back pain, myalgias and neck pain.   Skin: Negative for pallor and rash.   Neurological: Negative for light-headedness, numbness and headaches.     Objective:     Vital Signs (Most Recent):  Temp: 98.5 °F (36.9 °C) (08/29/20 1222)  Pulse: 97 (08/29/20 1222)  Resp: 18 (08/29/20 1521)  BP: (!) 112/58 (08/29/20 1222)  SpO2: 97 % (08/29/20 1551) Vital Signs (24h Range):  Temp:  [96.8 °F (36 °C)-98.5 °F (36.9 °C)] 98.5 °F (36.9 °C)  Pulse:  [] 97  Resp:  [16-20] 18  SpO2:  [96 %-98 %] 97 %  BP: (105-127)/(58-67) 112/58     Weight: 63.5 kg (140  lb)  Body mass index is 21.29 kg/m².    Intake/Output Summary (Last 24 hours) at 8/29/2020 1556  Last data filed at 8/29/2020 0300  Gross per 24 hour   Intake 240 ml   Output --   Net 240 ml      Physical Exam  Constitutional:       Appearance: Normal appearance.   Eyes:      Conjunctiva/sclera: Conjunctivae normal.      Pupils: Pupils are equal, round, and reactive to light.   Cardiovascular:      Rate and Rhythm: Normal rate and regular rhythm.      Pulses: Normal pulses.      Heart sounds: Normal heart sounds.   Pulmonary:      Effort: Pulmonary effort is normal. No respiratory distress.      Breath sounds: Normal breath sounds.   Abdominal:      General: Bowel sounds are normal. There is no distension.      Palpations: Abdomen is soft.      Tenderness: There is no abdominal tenderness.   Musculoskeletal:         General: Tenderness present.   Skin:     General: Skin is warm.      Findings: No bruising or rash.   Neurological:      Mental Status: He is alert and oriented to person, place, and time.   Psychiatric:         Mood and Affect: Affect is flat.         Behavior: Behavior is agitated.         Significant Labs:   BMP:   Recent Labs   Lab 08/29/20  0549         K 4.1      CO2 27   BUN 11   CREATININE 0.7   CALCIUM 9.3     CBC:   Recent Labs   Lab 08/28/20  0825 08/29/20  0549   WBC 6.16 5.99   HGB 11.5* 11.3*   HCT 37.3* 34.9*   * 500*       Significant Imaging: I have reviewed all pertinent imaging results/findings within the past 24 hours.      Assessment/Plan:      * SIRS (systemic inflammatory response syndrome)  24 year old M with a PMHx of IV drug abuse and depression presented to the ED complaining of left shoulder pain, right knee pain, and right ankle pain. On arrival temp 101.4, pulse 111, elevated CRP and Sed rate. WBC wnl. He has significant decreased ROM of left shoulder and swelling of right knee. Infectious workup started in the ED. Vanc and zosyn given in the  "ED.    Xray left shoulder, R knee, R ankle, CXR, and US right lower leg: no significant findings    POD 8: arthroscopy R knee and L shoulder, arthrotomy R ankle -  per ortho op-note "I think this is either a very early case of septic arthritis of multiple joints versus inflammatory poly arthritis. Given his febrile state and IV drug abuse I think he should be treated as though the septic arthritis even if his cultures fail to grow anything."    Plan:   -- Ortho consulted for septic arthritis r/o. Follwing recs  -- Aspiration of joints: R knee (31K WBC, 90% segs) R ankle (600 WBC, 43% segs), L shoulder clotted  -- Fluid aspiration gram stain pending  -- Continue Rocephin and Vanc.  -- ID consulted. Following recs.  -- Blood cultures NGTD  -- HIV negative  -- Hepatitis C antibody positive so will need follow up with Infectious Disease in [facility] as outpatient  -- Procalcitonin 0.67  -- Trend ESR and CRP daily  -- Working on placement for continued antibiotics.         Oligoarthritis of multiple sites - L shoulder, R knee, R ankle  IV drug user presented to the ED complaining of left shoulder pain, right knee pain, and right ankle pain. On arrival temp 101.4, pulse 111, elevated CRP and Sed rate. WBC wnl. He has significant decreased ROM of left shoulder and swelling of right knee. He states his pain has been severe, leading him unable to walk for 2 days.    Xray left shoulder, R knee, R ankle, CXR, and US right lower leg: no significant findings    Plan:   -- Ortho consulted. Following recs. See SIRS  -- Colchicine and Indomethacin d/eduardo  -- Oxycodone 10 mg Q4H PRN  -- Dilaudid 2 mg Q6H PRN  -- Scheduled Tylenol 650 mg TID      IVDU (intravenous drug user)  Pt reports use of IV drugs since the age of 17. States he has consistently used them for 5 years and became sober 6 months ago. He is on Subox-one at home which he last took 3 days ago. He relapsed last week. Last IV opioid use was 3 days ago. Patient has " a 1 week old  at home that could be contributing to the relapse. On exam he was tremulous with mildly dilated pupils. He reports recent sweating and cold chills. He denies N/V diarrhea. With his chronic use, he will likely be hyper-analgesic.     Plan:  -- Clinical Opiate Withdrawal Scale: 4  -- Ativan 0.5 mg D/Morgan  -- Psych consulted for addiction management. Following recs. See opioid dependence.  -- Continue to monitor for signs of withdrawl      Opioid use disorder, severe, dependence  Pt reports IV heroin use since the age of 17. He has consistantly used for the past 5 years. He states he was recently 6 months sober but relapsed 1 week ago. He would like to resume his Subox-one while inpatient, however he must be off opioids for > 20 hours per psych.    Plan:   -- Addiction Psychiatry consulted and following  -- Pain Management Service also consulted for assisstance  -- Clonidine 0.1mg PO q4hr PRN for withdrawal associated HTN  -- Bentyl 10mg PO q6hr PRN for abdominal muscle cramps  -- Loperamide 2mg PO q6hr PRN for diarrhea  -- Robaxin 500mg PO q6hr PRN for muscle spasm  -- Zofran 4mg ODT q8hr PRN for nausea  -- Vistaril 50mg PO qHS PRN for insomnia  -- Tylenol 650 mg TID  -- Elavil 25 mg QHS  -- Abilify 5 mg QD  -- Prozac 20 mg QD  -- Gabapentin 300 mg BID  -- Ibuprofen 800 mg TID with Protonix 40 mg daily    Anxiety and depression  Pt reports hx of anxiety/depression on Abilify and Prozac at home. He states he takes Prozac 40 mg daily and Abilify 20 mg daily which is different than how is documented in the chart. Patient is having mood swings with outbursts due to his pain not being controlled. Some of this may be due to his underlying psych history.     Plan:  -- Psych consulted for medication recs. Following  -- Abilify 5 mg daily  -- Prozac 20 mg daily. Discussed with psych about increasing dose on . Recommend keeping same dose for now.  -- Hydroxyzine Q8H PRN for anxiety and insomnia    Opioid  withdrawal  See Opioid use disorder, severe, dependence      Septic arthritis of right ankle  See SIRS (systemic inflammatory response syndrome)      Septic arthritis of shoulder, left  See SIRS (systemic inflammatory response syndrome)      Septic arthritis of knee, right  See SIRS (systemic inflammatory response syndrome)      Elevated C-reactive protein (CRP)  See SIRS (systemic inflammatory response syndrome)      Elevated erythrocyte sedimentation rate  See SIRS (systemic inflammatory response syndrome)        VTE Risk Mitigation (From admission, onward)         Ordered     Place sequential compression device  Until discontinued      08/19/20 1643     IP VTE LOW RISK PATIENT  Once      08/19/20 1643                Discharge Planning   TITA: 8/31/2020     Code Status: Full Code   Is the patient medically ready for discharge?: Yes    Reason for patient still in hospital (select all that apply): Other (specify) Awaiting LTAC placement  Discharge Plan A: Long-term acute care facility (LTAC)   Discharge Delays: (!) Patient and Family Barriers              Daly Hale MD  Department of Hospital Medicine   [facility]

## 2020-08-29 NOTE — SUBJECTIVE & OBJECTIVE
Interval History: NAEON. Patient awaiting LTAC placement. He was sleeping this AM on examination. Pain reported to be controlled after decreasing Dilaudid to Q6H.     Review of Systems   Constitutional: Negative for appetite change and fever.   HENT: Negative for congestion, sore throat and trouble swallowing.    Eyes: Negative for photophobia, pain and discharge.   Respiratory: Negative for cough and shortness of breath.    Cardiovascular: Negative for chest pain and palpitations.   Gastrointestinal: Negative for abdominal pain, diarrhea, nausea and vomiting.   Genitourinary: Negative for difficulty urinating.   Musculoskeletal: Positive for arthralgias and joint swelling. Negative for back pain, myalgias and neck pain.   Skin: Negative for pallor and rash.   Neurological: Negative for light-headedness, numbness and headaches.     Objective:     Vital Signs (Most Recent):  Temp: 98.5 °F (36.9 °C) (08/29/20 1222)  Pulse: 97 (08/29/20 1222)  Resp: 18 (08/29/20 1521)  BP: (!) 112/58 (08/29/20 1222)  SpO2: 97 % (08/29/20 1551) Vital Signs (24h Range):  Temp:  [96.8 °F (36 °C)-98.5 °F (36.9 °C)] 98.5 °F (36.9 °C)  Pulse:  [] 97  Resp:  [16-20] 18  SpO2:  [96 %-98 %] 97 %  BP: (105-127)/(58-67) 112/58     Weight: 63.5 kg (140 lb)  Body mass index is 21.29 kg/m².    Intake/Output Summary (Last 24 hours) at 8/29/2020 1556  Last data filed at 8/29/2020 0300  Gross per 24 hour   Intake 240 ml   Output --   Net 240 ml      Physical Exam  Constitutional:       Appearance: Normal appearance.   Eyes:      Conjunctiva/sclera: Conjunctivae normal.      Pupils: Pupils are equal, round, and reactive to light.   Cardiovascular:      Rate and Rhythm: Normal rate and regular rhythm.      Pulses: Normal pulses.      Heart sounds: Normal heart sounds.   Pulmonary:      Effort: Pulmonary effort is normal. No respiratory distress.      Breath sounds: Normal breath sounds.   Abdominal:      General: Bowel sounds are normal. There is  no distension.      Palpations: Abdomen is soft.      Tenderness: There is no abdominal tenderness.   Musculoskeletal:         General: Tenderness present.   Skin:     General: Skin is warm.      Findings: No bruising or rash.   Neurological:      Mental Status: He is alert and oriented to person, place, and time.   Psychiatric:         Mood and Affect: Affect is flat.         Behavior: Behavior is agitated.         Significant Labs:   BMP:   Recent Labs   Lab 08/29/20  0549         K 4.1      CO2 27   BUN 11   CREATININE 0.7   CALCIUM 9.3     CBC:   Recent Labs   Lab 08/28/20  0825 08/29/20  0549   WBC 6.16 5.99   HGB 11.5* 11.3*   HCT 37.3* 34.9*   * 500*       Significant Imaging: I have reviewed all pertinent imaging results/findings within the past 24 hours.

## 2020-08-29 NOTE — PLAN OF CARE
Problem: Fall Injury Risk  Goal: Absence of Fall and Fall-Related Injury  Outcome: Ongoing, Progressing     Problem: Adult Inpatient Plan of Care  Goal: Plan of Care Review  Outcome: Ongoing, Progressing  Goal: Patient-Specific Goal (Individualization)  Outcome: Ongoing, Progressing  Goal: Absence of Hospital-Acquired Illness or Injury  Outcome: Ongoing, Progressing  Goal: Optimal Comfort and Wellbeing  Outcome: Ongoing, Progressing  Goal: Readiness for Transition of Care  Outcome: Ongoing, Progressing  Goal: Rounds/Family Conference  Outcome: Ongoing, Progressing     Problem: Skin Injury Risk Increased  Goal: Skin Health and Integrity  Outcome: Ongoing, Progressing     Plan of care reviewed with pt. Pt verbalized understanding. Pt given prn pain medications for lt. Shoulder pain. VS remains stable. Pt is free of falls and injuries. I will continue to monitor .

## 2020-08-30 LAB
ALBUMIN SERPL BCP-MCNC: 3.2 G/DL (ref 3.5–5.2)
ALP SERPL-CCNC: 66 U/L (ref 55–135)
ALT SERPL W/O P-5'-P-CCNC: 9 U/L (ref 10–44)
ANION GAP SERPL CALC-SCNC: 8 MMOL/L (ref 8–16)
AST SERPL-CCNC: 13 U/L (ref 10–40)
BASOPHILS # BLD AUTO: 0.06 K/UL (ref 0–0.2)
BASOPHILS NFR BLD: 1.2 % (ref 0–1.9)
BILIRUB SERPL-MCNC: 0.2 MG/DL (ref 0.1–1)
BUN SERPL-MCNC: 13 MG/DL (ref 6–20)
CALCIUM SERPL-MCNC: 9.2 MG/DL (ref 8.7–10.5)
CHLORIDE SERPL-SCNC: 102 MMOL/L (ref 95–110)
CO2 SERPL-SCNC: 29 MMOL/L (ref 23–29)
CREAT SERPL-MCNC: 0.7 MG/DL (ref 0.5–1.4)
CRP SERPL-MCNC: 7.5 MG/L (ref 0–8.2)
DIFFERENTIAL METHOD: ABNORMAL
EOSINOPHIL # BLD AUTO: 0.2 K/UL (ref 0–0.5)
EOSINOPHIL NFR BLD: 4.3 % (ref 0–8)
ERYTHROCYTE [DISTWIDTH] IN BLOOD BY AUTOMATED COUNT: 12.7 % (ref 11.5–14.5)
ERYTHROCYTE [SEDIMENTATION RATE] IN BLOOD BY WESTERGREN METHOD: 69 MM/HR (ref 0–23)
EST. GFR  (AFRICAN AMERICAN): >60 ML/MIN/1.73 M^2
EST. GFR  (NON AFRICAN AMERICAN): >60 ML/MIN/1.73 M^2
GLUCOSE SERPL-MCNC: 93 MG/DL (ref 70–110)
HCT VFR BLD AUTO: 34.4 % (ref 40–54)
HGB BLD-MCNC: 11 G/DL (ref 14–18)
IMM GRANULOCYTES # BLD AUTO: 0.01 K/UL (ref 0–0.04)
IMM GRANULOCYTES NFR BLD AUTO: 0.2 % (ref 0–0.5)
LYMPHOCYTES # BLD AUTO: 1.9 K/UL (ref 1–4.8)
LYMPHOCYTES NFR BLD: 37.5 % (ref 18–48)
MCH RBC QN AUTO: 30.5 PG (ref 27–31)
MCHC RBC AUTO-ENTMCNC: 32 G/DL (ref 32–36)
MCV RBC AUTO: 95 FL (ref 82–98)
MONOCYTES # BLD AUTO: 0.9 K/UL (ref 0.3–1)
MONOCYTES NFR BLD: 18.5 % (ref 4–15)
NEUTROPHILS # BLD AUTO: 1.9 K/UL (ref 1.8–7.7)
NEUTROPHILS NFR BLD: 38.3 % (ref 38–73)
NRBC BLD-RTO: 0 /100 WBC
PLATELET # BLD AUTO: 443 K/UL (ref 150–350)
PMV BLD AUTO: 9.9 FL (ref 9.2–12.9)
POTASSIUM SERPL-SCNC: 3.7 MMOL/L (ref 3.5–5.1)
PROT SERPL-MCNC: 7.1 G/DL (ref 6–8.4)
RBC # BLD AUTO: 3.61 M/UL (ref 4.6–6.2)
SODIUM SERPL-SCNC: 139 MMOL/L (ref 136–145)
VANCOMYCIN TROUGH SERPL-MCNC: 11.8 UG/ML (ref 10–22)
WBC # BLD AUTO: 4.93 K/UL (ref 3.9–12.7)

## 2020-08-30 PROCEDURE — 99233 SBSQ HOSP IP/OBS HIGH 50: CPT | Mod: ,,, | Performed by: STUDENT IN AN ORGANIZED HEALTH CARE EDUCATION/TRAINING PROGRAM

## 2020-08-30 PROCEDURE — 63600175 PHARM REV CODE 636 W HCPCS: Performed by: STUDENT IN AN ORGANIZED HEALTH CARE EDUCATION/TRAINING PROGRAM

## 2020-08-30 PROCEDURE — 80202 ASSAY OF VANCOMYCIN: CPT

## 2020-08-30 PROCEDURE — 80053 COMPREHEN METABOLIC PANEL: CPT

## 2020-08-30 PROCEDURE — 99233 PR SUBSEQUENT HOSPITAL CARE,LEVL III: ICD-10-PCS | Mod: ,,, | Performed by: STUDENT IN AN ORGANIZED HEALTH CARE EDUCATION/TRAINING PROGRAM

## 2020-08-30 PROCEDURE — 85025 COMPLETE CBC W/AUTO DIFF WBC: CPT

## 2020-08-30 PROCEDURE — 85652 RBC SED RATE AUTOMATED: CPT

## 2020-08-30 PROCEDURE — 25000003 PHARM REV CODE 250: Performed by: ORTHOPAEDIC SURGERY

## 2020-08-30 PROCEDURE — 11000001 HC ACUTE MED/SURG PRIVATE ROOM

## 2020-08-30 PROCEDURE — 86140 C-REACTIVE PROTEIN: CPT

## 2020-08-30 PROCEDURE — 25000003 PHARM REV CODE 250: Performed by: STUDENT IN AN ORGANIZED HEALTH CARE EDUCATION/TRAINING PROGRAM

## 2020-08-30 PROCEDURE — 36415 COLL VENOUS BLD VENIPUNCTURE: CPT

## 2020-08-30 PROCEDURE — 25000003 PHARM REV CODE 250: Performed by: INTERNAL MEDICINE

## 2020-08-30 RX ORDER — HYDROMORPHONE HYDROCHLORIDE 1 MG/ML
1 INJECTION, SOLUTION INTRAMUSCULAR; INTRAVENOUS; SUBCUTANEOUS EVERY 6 HOURS PRN
Status: DISCONTINUED | OUTPATIENT
Start: 2020-08-30 | End: 2020-08-31

## 2020-08-30 RX ORDER — HYDROMORPHONE HYDROCHLORIDE 1 MG/ML
2 INJECTION, SOLUTION INTRAMUSCULAR; INTRAVENOUS; SUBCUTANEOUS EVERY 8 HOURS PRN
Status: DISCONTINUED | OUTPATIENT
Start: 2020-08-30 | End: 2020-08-30

## 2020-08-30 RX ADMIN — OXYCODONE HYDROCHLORIDE 10 MG: 10 TABLET ORAL at 09:08

## 2020-08-30 RX ADMIN — IBUPROFEN 800 MG: 400 TABLET, FILM COATED ORAL at 09:08

## 2020-08-30 RX ADMIN — ASPIRIN 81 MG: 81 TABLET, COATED ORAL at 09:08

## 2020-08-30 RX ADMIN — HYDROMORPHONE HYDROCHLORIDE 2 MG: 1 INJECTION, SOLUTION INTRAMUSCULAR; INTRAVENOUS; SUBCUTANEOUS at 04:08

## 2020-08-30 RX ADMIN — AMITRIPTYLINE HYDROCHLORIDE 25 MG: 25 TABLET, FILM COATED ORAL at 08:08

## 2020-08-30 RX ADMIN — METHOCARBAMOL TABLETS 750 MG: 750 TABLET, COATED ORAL at 05:08

## 2020-08-30 RX ADMIN — METHOCARBAMOL TABLETS 750 MG: 750 TABLET, COATED ORAL at 12:08

## 2020-08-30 RX ADMIN — PANTOPRAZOLE SODIUM 40 MG: 40 TABLET, DELAYED RELEASE ORAL at 09:08

## 2020-08-30 RX ADMIN — VANCOMYCIN HYDROCHLORIDE 2250 MG: 1.25 INJECTION, POWDER, LYOPHILIZED, FOR SOLUTION INTRAVENOUS at 02:08

## 2020-08-30 RX ADMIN — METHOCARBAMOL TABLETS 750 MG: 750 TABLET, COATED ORAL at 06:08

## 2020-08-30 RX ADMIN — IBUPROFEN 800 MG: 400 TABLET, FILM COATED ORAL at 02:08

## 2020-08-30 RX ADMIN — ACETAMINOPHEN 650 MG: 325 TABLET ORAL at 02:08

## 2020-08-30 RX ADMIN — CEFTRIAXONE 2 G: 2 INJECTION, SOLUTION INTRAVENOUS at 09:08

## 2020-08-30 RX ADMIN — ARIPIPRAZOLE 5 MG: 5 TABLET ORAL at 09:08

## 2020-08-30 RX ADMIN — HYDROMORPHONE HYDROCHLORIDE 1 MG: 1 INJECTION, SOLUTION INTRAMUSCULAR; INTRAVENOUS; SUBCUTANEOUS at 05:08

## 2020-08-30 RX ADMIN — GABAPENTIN 300 MG: 300 CAPSULE ORAL at 08:08

## 2020-08-30 RX ADMIN — HYDROMORPHONE HYDROCHLORIDE 1 MG: 1 INJECTION, SOLUTION INTRAMUSCULAR; INTRAVENOUS; SUBCUTANEOUS at 10:08

## 2020-08-30 RX ADMIN — FLUOXETINE 20 MG: 20 CAPSULE ORAL at 09:08

## 2020-08-30 RX ADMIN — GABAPENTIN 300 MG: 300 CAPSULE ORAL at 09:08

## 2020-08-30 RX ADMIN — OXYCODONE HYDROCHLORIDE 10 MG: 10 TABLET ORAL at 08:08

## 2020-08-30 RX ADMIN — ACETAMINOPHEN 650 MG: 325 TABLET ORAL at 09:08

## 2020-08-30 NOTE — PROGRESS NOTES
[facility] Hospital Medicine  Progress Note    Patient Name: Bebo Koroma  MRN: [id number]  Patient Class: IP- Inpatient   Admission Date: 8/19/2020  Length of Stay: 10 days  Attending Physician: Tariq Barrios MD  Primary Care Provider: Primary Doctor [facility] Medicine Team: [facility] Daly Hale MD    Subjective:     Principal Problem:SIRS (systemic inflammatory response syndrome)        HPI:  Mr. Koroma is a 24 year old M with a PMHx of IV drug abuse and depression that presented to the ED complaining of left shoulder pain, right knee pain, and right ankle pain. Pain started when the patient woke up about 3 days ago, mostly left shoulder pain at that time. He then developed right ankle pain and right knee pain. He describes the pain as constant, sharp and worse with movement. Shoulder pain radiates down to the antecubital fossa region. He tried taking Tylenol with no relief. He has experienced significant decrease in ROM of left shoulder and states that he could not walk for 2 days due to his pain. Reports subjective fevers, chills, fatigue, general weakness, night sweats. Denies N/V, chest pain, SOB, abdominal pain, diarrhea, pain with urination. Patient reports that he last used IV opioids about 3 days ago. Injects into the left arm. On Subox-one at home but has not taken in 3 days. Denies alcohol use and other recreational drugs.     Pt arrived to ED with temp of 101.4, tachycardic elevated CRP and ESR. WBC and lactate wnl. Blood cultures done and 1 dose of vanc and zosyn given along with IVF. Xray L shoulder with no significant findings.     Overview/Hospital Course:  Pt admitted to IM 2 for further management of sepsis (source unknown at the time). Ortho consulted to r/o septic arthritis. Xray of right knee and ankle ordered. Psych consulted for management of addiction and medications (on Abilify, Prozac, and Subox-one at home). R knee tapped (31K WBC, 90% segs)  R ankle tapped (600 WBC, 43% seg), L shoulder tapped (clotted). Cultures pending, NGTD. Taken to the OR on 8/19 for arthroscopy of knee and shoulder and arthrotomy of ankle. 8/20 pt in pain, crying, sweating with chills. Subox-one unable to be restarted due to patient being on opioids. Adjusted pain medications for better pain control. Started opioid withdrawal protocol per psych recs. Restarting Abilify and Prozac per psych recs. Patient has had outburst with staff, stating his pain is not being controlled. Acute pain service consulted for further recs. Increased Prozac to 20 mg daily due to patient stating he takes 40 mg at home and his mood swings. Pending LTAC placement at this time.    Interval History: NAEON. Patient awaiting LTAC placement for long term antibiotics. Dilaudid decreased to 1 mg Q6H PRN.     Review of Systems   Constitutional: Negative for appetite change and fever.   HENT: Negative for congestion, sore throat and trouble swallowing.    Eyes: Negative for photophobia, pain and discharge.   Respiratory: Negative for cough and shortness of breath.    Cardiovascular: Negative for chest pain and palpitations.   Gastrointestinal: Negative for abdominal pain, diarrhea, nausea and vomiting.   Genitourinary: Negative for difficulty urinating.   Musculoskeletal: Positive for arthralgias and joint swelling. Negative for back pain, myalgias and neck pain.   Skin: Negative for pallor and rash.   Neurological: Negative for light-headedness, numbness and headaches.     Objective:     Vital Signs (Most Recent):  Temp: 98.1 °F (36.7 °C) (08/30/20 1151)  Pulse: 97 (08/30/20 1151)  Resp: 18 (08/30/20 1151)  BP: 104/63 (08/30/20 1151)  SpO2: 98 % (08/30/20 1151) Vital Signs (24h Range):  Temp:  [97.2 °F (36.2 °C)-99.2 °F (37.3 °C)] 98.1 °F (36.7 °C)  Pulse:  [] 97  Resp:  [12-20] 18  SpO2:  [91 %-100 %] 98 %  BP: ()/(58-69) 104/63     Weight: 63.5 kg (140 lb)  Body mass index is 21.29  kg/m².    Intake/Output Summary (Last 24 hours) at 8/30/2020 1413  Last data filed at 8/30/2020 1409  Gross per 24 hour   Intake 480 ml   Output --   Net 480 ml      Physical Exam  Constitutional:       Appearance: Normal appearance.   Eyes:      Conjunctiva/sclera: Conjunctivae normal.      Pupils: Pupils are equal, round, and reactive to light.   Cardiovascular:      Rate and Rhythm: Normal rate and regular rhythm.      Pulses: Normal pulses.      Heart sounds: Normal heart sounds.   Pulmonary:      Effort: Pulmonary effort is normal. No respiratory distress.      Breath sounds: Normal breath sounds.   Abdominal:      General: Bowel sounds are normal. There is no distension.      Palpations: Abdomen is soft.      Tenderness: There is no abdominal tenderness.   Musculoskeletal:         General: Tenderness present.   Skin:     General: Skin is warm.      Findings: No bruising or rash.   Neurological:      Mental Status: He is alert and oriented to person, place, and time.   Psychiatric:         Mood and Affect: Affect is flat.         Behavior: Behavior is agitated.         Significant Labs:   BMP:   Recent Labs   Lab 08/30/20  0425   GLU 93      K 3.7      CO2 29   BUN 13   CREATININE 0.7   CALCIUM 9.2     CBC:   Recent Labs   Lab 08/29/20  0549 08/30/20  0425   WBC 5.99 4.93   HGB 11.3* 11.0*   HCT 34.9* 34.4*   * 443*       Significant Imaging: I have reviewed all pertinent imaging results/findings within the past 24 hours.      Assessment/Plan:      * SIRS (systemic inflammatory response syndrome)  24 year old M with a PMHx of IV drug abuse and depression presented to the ED complaining of left shoulder pain, right knee pain, and right ankle pain. On arrival temp 101.4, pulse 111, elevated CRP and Sed rate. WBC wnl. He has significant decreased ROM of left shoulder and swelling of right knee. Infectious workup started in the ED. Vanc and zosyn given in the ED.    Xray left shoulder, R knee, R  "ankle, CXR, and US right lower leg: no significant findings    POD 8: arthroscopy R knee and L shoulder, arthrotomy R ankle -  per ortho op-note "I think this is either a very early case of septic arthritis of multiple joints versus inflammatory poly arthritis. Given his febrile state and IV drug abuse I think he should be treated as though the septic arthritis even if his cultures fail to grow anything."    Plan:   -- Ortho consulted for septic arthritis r/o. Follwing recs  -- Aspiration of joints: R knee (31K WBC, 90% segs) R ankle (600 WBC, 43% segs), L shoulder clotted  -- Fluid aspiration gram stain pending  -- Continue Rocephin and Vanc.  -- ID consulted. Following recs.  -- Blood cultures NGTD  -- HIV negative  -- Hepatitis C antibody positive so will need follow up with Infectious Disease in [facility] as outpatient  -- Procalcitonin 0.67  -- Trend ESR and CRP daily  -- Working on placement for continued antibiotics.         Oligoarthritis of multiple sites - L shoulder, R knee, R ankle  IV drug user presented to the ED complaining of left shoulder pain, right knee pain, and right ankle pain. On arrival temp 101.4, pulse 111, elevated CRP and Sed rate. WBC wnl. He has significant decreased ROM of left shoulder and swelling of right knee. He states his pain has been severe, leading him unable to walk for 2 days.    Xray left shoulder, R knee, R ankle, CXR, and US right lower leg: no significant findings    Plan:   -- Ortho consulted. Following recs. See SIRS  -- Colchicine and Indomethacin d/eduardo  -- Oxycodone 10 mg Q4H PRN  -- Dilaudid 1 mg Q6H PRN  -- Scheduled Tylenol 650 mg TID      IVDU (intravenous drug user)  Pt reports use of IV drugs since the age of 17. States he has consistently used them for 5 years and became sober 6 months ago. He is on Subox-one at home which he last took 3 days ago. He relapsed last week. Last IV opioid use was 3 days ago. Patient has a 1 week old  at home that " could be contributing to the relapse. On exam he was tremulous with mildly dilated pupils. He reports recent sweating and cold chills. He denies N/V diarrhea. With his chronic use, he will likely be hyper-analgesic.     Plan:  -- Clinical Opiate Withdrawal Scale: 4  -- Ativan 0.5 mg D/Morgan  -- Psych consulted for addiction management. Following recs. See opioid dependence.  -- Continue to monitor for signs of withdrawl      Opioid use disorder, severe, dependence  Pt reports IV heroin use since the age of 17. He has consistantly used for the past 5 years. He states he was recently 6 months sober but relapsed 1 week ago. He would like to resume his Subox-one while inpatient, however he must be off opioids for > 20 hours per psych.    Plan:   -- Addiction Psychiatry consulted and following  -- Pain Management Service also consulted for assisstance  -- Clonidine 0.1mg PO q4hr PRN for withdrawal associated HTN  -- Bentyl 10mg PO q6hr PRN for abdominal muscle cramps  -- Loperamide 2mg PO q6hr PRN for diarrhea  -- Robaxin 500mg PO q6hr PRN for muscle spasm  -- Zofran 4mg ODT q8hr PRN for nausea  -- Vistaril 50mg PO qHS PRN for insomnia  -- Tylenol 650 mg TID  -- Elavil 25 mg QHS  -- Abilify 5 mg QD  -- Prozac 20 mg QD  -- Gabapentin 300 mg BID  -- Ibuprofen 800 mg TID with Protonix 40 mg daily    Anxiety and depression  Pt reports hx of anxiety/depression on Abilify and Prozac at home. He states he takes Prozac 40 mg daily and Abilify 20 mg daily which is different than how is documented in the chart. Patient is having mood swings with outbursts due to his pain not being controlled. Some of this may be due to his underlying psych history.     Plan:  -- Psych consulted for medication recs. Following  -- Abilify 5 mg daily  -- Prozac 20 mg daily. Discussed with psych about increasing dose on 8/28. Recommend keeping same dose for now.  -- Hydroxyzine Q8H PRN for anxiety and insomnia    Opioid withdrawal  See Opioid use  disorder, severe, dependence      Septic arthritis of right ankle  See SIRS (systemic inflammatory response syndrome)      Septic arthritis of shoulder, left  See SIRS (systemic inflammatory response syndrome)      Septic arthritis of knee, right  See SIRS (systemic inflammatory response syndrome)      Elevated C-reactive protein (CRP)  See SIRS (systemic inflammatory response syndrome)      Elevated erythrocyte sedimentation rate  See SIRS (systemic inflammatory response syndrome)        VTE Risk Mitigation (From admission, onward)         Ordered     Place sequential compression device  Until discontinued      08/19/20 1643     IP VTE LOW RISK PATIENT  Once      08/19/20 1643                Discharge Planning   TITA: 8/31/2020     Code Status: Full Code   Is the patient medically ready for discharge?: Yes    Reason for patient still in hospital (select all that apply): Treatment and Other (specify) Awaiting LTAC placement   Discharge Plan A: Long-term acute care facility (LTAC)   Discharge Delays: (!) Patient and Family Barriers              Daly Hale MD  Department of Hospital Medicine   [facility]

## 2020-08-30 NOTE — SUBJECTIVE & OBJECTIVE
Interval History: NAEON. Patient awaiting LTAC placement for long term antibiotics. Dilaudid decreased to 1 mg Q6H PRN.     Review of Systems   Constitutional: Negative for appetite change and fever.   HENT: Negative for congestion, sore throat and trouble swallowing.    Eyes: Negative for photophobia, pain and discharge.   Respiratory: Negative for cough and shortness of breath.    Cardiovascular: Negative for chest pain and palpitations.   Gastrointestinal: Negative for abdominal pain, diarrhea, nausea and vomiting.   Genitourinary: Negative for difficulty urinating.   Musculoskeletal: Positive for arthralgias and joint swelling. Negative for back pain, myalgias and neck pain.   Skin: Negative for pallor and rash.   Neurological: Negative for light-headedness, numbness and headaches.     Objective:     Vital Signs (Most Recent):  Temp: 98.1 °F (36.7 °C) (08/30/20 1151)  Pulse: 97 (08/30/20 1151)  Resp: 18 (08/30/20 1151)  BP: 104/63 (08/30/20 1151)  SpO2: 98 % (08/30/20 1151) Vital Signs (24h Range):  Temp:  [97.2 °F (36.2 °C)-99.2 °F (37.3 °C)] 98.1 °F (36.7 °C)  Pulse:  [] 97  Resp:  [12-20] 18  SpO2:  [91 %-100 %] 98 %  BP: ()/(58-69) 104/63     Weight: 63.5 kg (140 lb)  Body mass index is 21.29 kg/m².    Intake/Output Summary (Last 24 hours) at 8/30/2020 1413  Last data filed at 8/30/2020 1409  Gross per 24 hour   Intake 480 ml   Output --   Net 480 ml      Physical Exam  Constitutional:       Appearance: Normal appearance.   Eyes:      Conjunctiva/sclera: Conjunctivae normal.      Pupils: Pupils are equal, round, and reactive to light.   Cardiovascular:      Rate and Rhythm: Normal rate and regular rhythm.      Pulses: Normal pulses.      Heart sounds: Normal heart sounds.   Pulmonary:      Effort: Pulmonary effort is normal. No respiratory distress.      Breath sounds: Normal breath sounds.   Abdominal:      General: Bowel sounds are normal. There is no distension.      Palpations: Abdomen is  soft.      Tenderness: There is no abdominal tenderness.   Musculoskeletal:         General: Tenderness present.   Skin:     General: Skin is warm.      Findings: No bruising or rash.   Neurological:      Mental Status: He is alert and oriented to person, place, and time.   Psychiatric:         Mood and Affect: Affect is flat.         Behavior: Behavior is agitated.         Significant Labs:   BMP:   Recent Labs   Lab 08/30/20  0425   GLU 93      K 3.7      CO2 29   BUN 13   CREATININE 0.7   CALCIUM 9.2     CBC:   Recent Labs   Lab 08/29/20  0549 08/30/20  0425   WBC 5.99 4.93   HGB 11.3* 11.0*   HCT 34.9* 34.4*   * 443*       Significant Imaging: I have reviewed all pertinent imaging results/findings within the past 24 hours.

## 2020-08-30 NOTE — PROGRESS NOTES
Pharmacokinetic Assessment Follow Up: IV Vancomycin    Vancomycin serum concentration assessment(s):    Vancomycin trough = 11.8 mcg/mL.  Drawn correctly and can be used to guide therapy    Vancomycin Regimen Plan:  1. Increase to vancomycin 2250 mg IV q12h  2. Trough prior to dose due @ 1030 on 9/1/20  3. Goal trough = 15-20 mcg/mL    Drug levels (last 3 results):  Recent Labs   Lab Result Units 08/28/20  1211 08/30/20  1203   Vancomycin-Trough ug/mL 13.5 11.8       Thank you for the consult, will continue to follow  Staci Wiley.D., BCPS  [id number]         Patient brief summary:  Bebo Koroma is a 24 y.o. male initiated on antimicrobial therapy with IV Vancomycin for treatment of R knee septic arthritis      Drug Allergies:   Review of patient's allergies indicates:   Allergen Reactions    Penicillins Hives     Unclear of last episode of allergic reaction  Tolerated ceftriaxone 08/2020       Actual Body Weight:   63.5 kg    Renal Function:   Estimated Creatinine Clearance: 147.4 mL/min (based on SCr of 0.7 mg/dL).,       CBC (last 72 hours):  Recent Labs   Lab Result Units 08/28/20  0825 08/29/20  0549 08/30/20  0425   WBC K/uL 6.16 5.99 4.93   Hemoglobin g/dL 11.5* 11.3* 11.0*   Hematocrit % 37.3* 34.9* 34.4*   Platelets K/uL 491* 500* 443*   Gran% % 44.1 60.0 38.3   Lymph% % 41.6 21.9 37.5   Mono% % 9.7 13.0 18.5*   Eosinophil% % 3.2 3.8 4.3   Basophil% % 0.8 1.0 1.2   Differential Method  Automated Automated Automated       Metabolic Panel (last 72 hours):  Recent Labs   Lab Result Units 08/28/20  0825 08/29/20  0549 08/30/20  0425   Sodium mmol/L 138 139 139   Potassium mmol/L 4.3 4.1 3.7   Chloride mmol/L 102 104 102   CO2 mmol/L 29 27 29   Glucose mg/dL 94 104 93   BUN, Bld mg/dL 16 11 13   Creatinine mg/dL 0.7 0.7 0.7   Albumin g/dL 3.4* 3.3* 3.2*   Total Bilirubin mg/dL 0.3 0.3 0.2   Alkaline Phosphatase U/L 66 64 66   AST U/L 11 11 13   ALT U/L 11 9* 9*       Vancomycin Administrations:  vancomycin  given in the last 96 hours                   vancomycin 1.25 g in dextrose 5% 250 mL IVPB (ready to mix) (mg) 1,250 mg New Bag 08/28/20 1426     1,250 mg New Bag  0536     1,250 mg New Bag 08/27/20 2230     1,250 mg New Bag  1249    vancomycin 1.5 g in dextrose 5 % 250 mL IVPB (ready to mix) (mg) 1,500 mg New Bag 08/27/20 0209     1,500 mg New Bag 08/26/20 1749     1,500 mg New Bag  0718    vancomycin 1.5 g in dextrose 5 % 250 mL IVPB (ready to mix) (mg) 1,500 mg New Bag 08/25/20 2327    vancomycin 1.5 g in dextrose 5 % 250 mL IVPB (ready to mix) (mg) 1,500 mg New Bag 08/25/20 0642     1,500 mg New Bag 08/24/20 2200                Microbiologic Results:  Microbiology Results (last 7 days)     Procedure Component Value Units Date/Time    Culture, Anaerobic [[id number]] Collected: 08/19/20 1805    Order Status: Completed Specimen: Joint Fluid from Shoulder, Left Updated: 08/27/20 0734     Anaerobic Culture No anaerobes isolated    Culture, Anaerobic [[id number]] Collected: 08/19/20 1802    Order Status: Completed Specimen: Joint Fluid from Knee, Right Updated: 08/27/20 0734     Anaerobic Culture No anaerobes isolated    Culture, Anaerobic [[id number]] Collected: 08/19/20 1804    Order Status: Completed Specimen: Joint Fluid from Ankle, Right Updated: 08/27/20 0734     Anaerobic Culture No anaerobes isolated    Fungus culture [[id number]] Collected: 08/19/20 1802    Order Status: Completed Specimen: Joint Fluid from Knee, Right Updated: 08/25/20 1210     Fungus (Mycology) Culture Culture in progress    Fungus culture [[id number]] Collected: 08/19/20 1805    Order Status: Completed Specimen: Joint Fluid from Shoulder, Left Updated: 08/25/20 1210     Fungus (Mycology) Culture Culture in progress    Fungus culture [[id number]] Collected: 08/19/20 1804    Order Status: Completed Specimen: Joint Fluid from Ankle, Right Updated: 08/25/20 1210     Fungus (Mycology) Culture Culture in progress    Blood culture [[id number]] Collected:  08/19/20 1644    Order Status: Completed Specimen: Blood from Peripheral, Antecubital, Right Updated: 08/24/20 1822     Blood Culture, Routine No growth after 5 days.    Blood culture x two cultures. Draw prior to antibiotics. [[id number]] Collected: 08/19/20 1140    Order Status: Completed Specimen: Blood from Peripheral, Antecubital, Right Updated: 08/24/20 1412     Blood Culture, Routine No growth after 5 days.    Narrative:      Aerobic and anaerobic    Blood culture x two cultures. Draw prior to antibiotics. [[id number]] Collected: 08/19/20 1141    Order Status: Completed Specimen: Blood from Peripheral, Forearm, Right Updated: 08/24/20 1412     Blood Culture, Routine No growth after 5 days.    Narrative:      Aerobic and anaerobic

## 2020-08-30 NOTE — CONSULTS
Bebo Koroma  has been accepted for transfer to [facility] and will be followed through telemedicine services beginning 08/30/20  at 7am.    Selene Edwards

## 2020-08-30 NOTE — PLAN OF CARE
Patient AAOx4, ambulates independently.  Patient reports constant moderate-severe pain in left arm and right leg; PRN pain medication given when requested and appropriate.  VS stable on RA.  No falls or injuries, no major events.  Will continue to monitor.

## 2020-08-30 NOTE — CARE UPDATE
Discussed with Dr. Edwards.  Patient will remain on IM2.     Tariq Barrios M.D.  [facility] Medicine  Pager [phone]

## 2020-08-31 LAB
ALBUMIN SERPL BCP-MCNC: 3.7 G/DL (ref 3.5–5.2)
ALP SERPL-CCNC: 70 U/L (ref 55–135)
ALT SERPL W/O P-5'-P-CCNC: 11 U/L (ref 10–44)
ANION GAP SERPL CALC-SCNC: 9 MMOL/L (ref 8–16)
AST SERPL-CCNC: 15 U/L (ref 10–40)
BILIRUB SERPL-MCNC: 0.2 MG/DL (ref 0.1–1)
BUN SERPL-MCNC: 12 MG/DL (ref 6–20)
CALCIUM SERPL-MCNC: 9.5 MG/DL (ref 8.7–10.5)
CHLORIDE SERPL-SCNC: 104 MMOL/L (ref 95–110)
CO2 SERPL-SCNC: 27 MMOL/L (ref 23–29)
CREAT SERPL-MCNC: 0.8 MG/DL (ref 0.5–1.4)
CRP SERPL-MCNC: 6.5 MG/L (ref 0–8.2)
ERYTHROCYTE [SEDIMENTATION RATE] IN BLOOD BY WESTERGREN METHOD: 69 MM/HR (ref 0–23)
EST. GFR  (AFRICAN AMERICAN): >60 ML/MIN/1.73 M^2
EST. GFR  (NON AFRICAN AMERICAN): >60 ML/MIN/1.73 M^2
GLUCOSE SERPL-MCNC: 109 MG/DL (ref 70–110)
POTASSIUM SERPL-SCNC: 4 MMOL/L (ref 3.5–5.1)
PROT SERPL-MCNC: 8.1 G/DL (ref 6–8.4)
SODIUM SERPL-SCNC: 140 MMOL/L (ref 136–145)

## 2020-08-31 PROCEDURE — 85652 RBC SED RATE AUTOMATED: CPT

## 2020-08-31 PROCEDURE — 99233 SBSQ HOSP IP/OBS HIGH 50: CPT | Mod: ,,, | Performed by: STUDENT IN AN ORGANIZED HEALTH CARE EDUCATION/TRAINING PROGRAM

## 2020-08-31 PROCEDURE — 25000003 PHARM REV CODE 250: Performed by: STUDENT IN AN ORGANIZED HEALTH CARE EDUCATION/TRAINING PROGRAM

## 2020-08-31 PROCEDURE — 80053 COMPREHEN METABOLIC PANEL: CPT

## 2020-08-31 PROCEDURE — 63600175 PHARM REV CODE 636 W HCPCS: Performed by: STUDENT IN AN ORGANIZED HEALTH CARE EDUCATION/TRAINING PROGRAM

## 2020-08-31 PROCEDURE — S4991 NICOTINE PATCH NONLEGEND: HCPCS | Performed by: STUDENT IN AN ORGANIZED HEALTH CARE EDUCATION/TRAINING PROGRAM

## 2020-08-31 PROCEDURE — 86140 C-REACTIVE PROTEIN: CPT

## 2020-08-31 PROCEDURE — 94761 N-INVAS EAR/PLS OXIMETRY MLT: CPT

## 2020-08-31 PROCEDURE — 11000001 HC ACUTE MED/SURG PRIVATE ROOM

## 2020-08-31 PROCEDURE — 36415 COLL VENOUS BLD VENIPUNCTURE: CPT

## 2020-08-31 PROCEDURE — 25000003 PHARM REV CODE 250: Performed by: INTERNAL MEDICINE

## 2020-08-31 PROCEDURE — 25000003 PHARM REV CODE 250: Performed by: ORTHOPAEDIC SURGERY

## 2020-08-31 PROCEDURE — 99233 PR SUBSEQUENT HOSPITAL CARE,LEVL III: ICD-10-PCS | Mod: ,,, | Performed by: STUDENT IN AN ORGANIZED HEALTH CARE EDUCATION/TRAINING PROGRAM

## 2020-08-31 RX ORDER — METHOCARBAMOL 750 MG/1
750 TABLET, FILM COATED ORAL EVERY 6 HOURS
Status: CANCELLED | OUTPATIENT
Start: 2020-08-31

## 2020-08-31 RX ORDER — ASPIRIN 81 MG/1
81 TABLET ORAL DAILY
Status: CANCELLED | OUTPATIENT
Start: 2020-09-01

## 2020-08-31 RX ORDER — TALC
6 POWDER (GRAM) TOPICAL NIGHTLY PRN
Status: CANCELLED | OUTPATIENT
Start: 2020-08-31

## 2020-08-31 RX ORDER — AMITRIPTYLINE HYDROCHLORIDE 25 MG/1
25 TABLET, FILM COATED ORAL NIGHTLY
Status: CANCELLED | OUTPATIENT
Start: 2020-08-31

## 2020-08-31 RX ORDER — IBUPROFEN 200 MG
16 TABLET ORAL
Status: CANCELLED | OUTPATIENT
Start: 2020-08-31

## 2020-08-31 RX ORDER — SUCRALFATE 1 G/10ML
1 SUSPENSION ORAL EVERY 6 HOURS
Status: CANCELLED | OUTPATIENT
Start: 2020-08-31

## 2020-08-31 RX ORDER — DIAZEPAM 5 MG/1
5 TABLET ORAL DAILY
Status: COMPLETED | OUTPATIENT
Start: 2020-08-31 | End: 2020-09-01

## 2020-08-31 RX ORDER — DICYCLOMINE HYDROCHLORIDE 10 MG/1
10 CAPSULE ORAL EVERY 6 HOURS PRN
Status: CANCELLED | OUTPATIENT
Start: 2020-08-31

## 2020-08-31 RX ORDER — GABAPENTIN 300 MG/1
300 CAPSULE ORAL 2 TIMES DAILY
Status: CANCELLED | OUTPATIENT
Start: 2020-08-31

## 2020-08-31 RX ORDER — GLUCAGON 1 MG
1 KIT INJECTION
Status: CANCELLED | OUTPATIENT
Start: 2020-08-31

## 2020-08-31 RX ORDER — FLUOXETINE HYDROCHLORIDE 20 MG/1
20 CAPSULE ORAL DAILY
Status: CANCELLED | OUTPATIENT
Start: 2020-09-01

## 2020-08-31 RX ORDER — PANTOPRAZOLE SODIUM 40 MG/1
40 TABLET, DELAYED RELEASE ORAL DAILY
Status: CANCELLED | OUTPATIENT
Start: 2020-09-01

## 2020-08-31 RX ORDER — SODIUM CHLORIDE 0.9 % (FLUSH) 0.9 %
10 SYRINGE (ML) INJECTION
Status: CANCELLED | OUTPATIENT
Start: 2020-08-31

## 2020-08-31 RX ORDER — IBUPROFEN 200 MG
24 TABLET ORAL
Status: CANCELLED | OUTPATIENT
Start: 2020-08-31

## 2020-08-31 RX ORDER — AMOXICILLIN 250 MG
1 CAPSULE ORAL DAILY PRN
Status: CANCELLED | OUTPATIENT
Start: 2020-08-31

## 2020-08-31 RX ORDER — MAG HYDROX/ALUMINUM HYD/SIMETH 200-200-20
30 SUSPENSION, ORAL (FINAL DOSE FORM) ORAL
Status: CANCELLED | OUTPATIENT
Start: 2020-08-31

## 2020-08-31 RX ORDER — LOPERAMIDE HYDROCHLORIDE 2 MG/1
2 CAPSULE ORAL EVERY 6 HOURS PRN
Status: CANCELLED | OUTPATIENT
Start: 2020-08-31

## 2020-08-31 RX ORDER — NALOXONE HCL 0.4 MG/ML
0.4 VIAL (ML) INJECTION
Status: CANCELLED | OUTPATIENT
Start: 2020-08-31

## 2020-08-31 RX ORDER — IBUPROFEN 200 MG
1 TABLET ORAL DAILY
Status: CANCELLED | OUTPATIENT
Start: 2020-09-01

## 2020-08-31 RX ORDER — IBUPROFEN 400 MG/1
800 TABLET ORAL 3 TIMES DAILY
Status: CANCELLED | OUTPATIENT
Start: 2020-08-31

## 2020-08-31 RX ORDER — HYDROXYZINE PAMOATE 25 MG/1
50 CAPSULE ORAL EVERY 8 HOURS PRN
Status: CANCELLED | OUTPATIENT
Start: 2020-08-31

## 2020-08-31 RX ORDER — CLONIDINE HYDROCHLORIDE 0.1 MG/1
0.1 TABLET ORAL EVERY 4 HOURS PRN
Status: CANCELLED | OUTPATIENT
Start: 2020-08-31

## 2020-08-31 RX ORDER — LIDOCAINE 50 MG/G
1 PATCH TOPICAL
Status: CANCELLED | OUTPATIENT
Start: 2020-08-31

## 2020-08-31 RX ORDER — ACETAMINOPHEN 325 MG/1
650 TABLET ORAL 3 TIMES DAILY
Status: CANCELLED | OUTPATIENT
Start: 2020-08-31

## 2020-08-31 RX ORDER — ARIPIPRAZOLE 5 MG/1
5 TABLET ORAL DAILY
Status: CANCELLED | OUTPATIENT
Start: 2020-09-01

## 2020-08-31 RX ORDER — ONDANSETRON 8 MG/1
8 TABLET, ORALLY DISINTEGRATING ORAL EVERY 8 HOURS PRN
Status: CANCELLED | OUTPATIENT
Start: 2020-08-31

## 2020-08-31 RX ADMIN — PANTOPRAZOLE SODIUM 40 MG: 40 TABLET, DELAYED RELEASE ORAL at 08:08

## 2020-08-31 RX ADMIN — VANCOMYCIN HYDROCHLORIDE 2250 MG: 1.25 INJECTION, POWDER, LYOPHILIZED, FOR SOLUTION INTRAVENOUS at 01:08

## 2020-08-31 RX ADMIN — METHOCARBAMOL TABLETS 750 MG: 750 TABLET, COATED ORAL at 11:08

## 2020-08-31 RX ADMIN — HYDROMORPHONE HYDROCHLORIDE 1 MG: 1 INJECTION, SOLUTION INTRAMUSCULAR; INTRAVENOUS; SUBCUTANEOUS at 12:08

## 2020-08-31 RX ADMIN — IBUPROFEN 800 MG: 400 TABLET, FILM COATED ORAL at 09:08

## 2020-08-31 RX ADMIN — ASPIRIN 81 MG: 81 TABLET, COATED ORAL at 08:08

## 2020-08-31 RX ADMIN — AMITRIPTYLINE HYDROCHLORIDE 25 MG: 25 TABLET, FILM COATED ORAL at 09:08

## 2020-08-31 RX ADMIN — GABAPENTIN 300 MG: 300 CAPSULE ORAL at 09:08

## 2020-08-31 RX ADMIN — CEFTRIAXONE 2 G: 2 INJECTION, SOLUTION INTRAVENOUS at 08:08

## 2020-08-31 RX ADMIN — ACETAMINOPHEN 650 MG: 325 TABLET ORAL at 08:08

## 2020-08-31 RX ADMIN — HYDROXYZINE PAMOATE 50 MG: 25 CAPSULE ORAL at 08:08

## 2020-08-31 RX ADMIN — OXYCODONE HYDROCHLORIDE 10 MG: 10 TABLET ORAL at 04:08

## 2020-08-31 RX ADMIN — DIAZEPAM 5 MG: 5 TABLET ORAL at 05:08

## 2020-08-31 RX ADMIN — FLUOXETINE 20 MG: 20 CAPSULE ORAL at 08:08

## 2020-08-31 RX ADMIN — GABAPENTIN 300 MG: 300 CAPSULE ORAL at 08:08

## 2020-08-31 RX ADMIN — METHOCARBAMOL TABLETS 750 MG: 750 TABLET, COATED ORAL at 05:08

## 2020-08-31 RX ADMIN — CLONIDINE HYDROCHLORIDE 0.1 MG: 0.1 TABLET ORAL at 08:08

## 2020-08-31 RX ADMIN — IBUPROFEN 800 MG: 400 TABLET, FILM COATED ORAL at 08:08

## 2020-08-31 RX ADMIN — ARIPIPRAZOLE 5 MG: 5 TABLET ORAL at 08:08

## 2020-08-31 RX ADMIN — HYDROMORPHONE HYDROCHLORIDE 1 MG: 1 INJECTION, SOLUTION INTRAMUSCULAR; INTRAVENOUS; SUBCUTANEOUS at 06:08

## 2020-08-31 RX ADMIN — ACETAMINOPHEN 650 MG: 325 TABLET ORAL at 09:08

## 2020-08-31 RX ADMIN — HYDROXYZINE PAMOATE 50 MG: 25 CAPSULE ORAL at 04:08

## 2020-08-31 NOTE — PROGRESS NOTES
[facility] Hospital Medicine  Progress Note    Patient Name: Bebo Koroma  MRN: [id number]  Patient Class: IP- Inpatient   Admission Date: 8/19/2020  Length of Stay: 11 days  Attending Physician: Tariq Barrios MD  Primary Care Provider: Primary Doctor [facility] Medicine Team: [facility] [place] [facility] 2 Jacob Posada MD    Subjective:     Principal Problem:SIRS (systemic inflammatory response syndrome)        HPI:  Mr. Koroma is a 24 year old M with a PMHx of IV drug abuse and depression that presented to the ED complaining of left shoulder pain, right knee pain, and right ankle pain. Pain started when the patient woke up about 3 days ago, mostly left shoulder pain at that time. He then developed right ankle pain and right knee pain. He describes the pain as constant, sharp and worse with movement. Shoulder pain radiates down to the antecubital fossa region. He tried taking Tylenol with no relief. He has experienced significant decrease in ROM of left shoulder and states that he could not walk for 2 days due to his pain. Reports subjective fevers, chills, fatigue, general weakness, night sweats. Denies N/V, chest pain, SOB, abdominal pain, diarrhea, pain with urination. Patient reports that he last used IV opioids about 3 days ago. Injects into the left arm. On Subox-one at home but has not taken in 3 days. Denies alcohol use and other recreational drugs.     Pt arrived to ED with temp of 101.4, tachycardic elevated CRP and ESR. WBC and lactate wnl. Blood cultures done and 1 dose of vanc and zosyn given along with IVF. Xray L shoulder with no significant findings.     Overview/Hospital Course:  Pt admitted to IM 2 for further management of sepsis (source unknown at the time). Ortho consulted to r/o septic arthritis. Xray of right knee and ankle ordered. Psych consulted for management of addiction and medications (on Abilify, Prozac, and Subox-one at home). R knee tapped (31K WBC, 90% segs)  R ankle tapped (600 WBC, 43% seg), L shoulder tapped (clotted). Cultures pending, NGTD. Taken to the OR on 8/19 for arthroscopy of knee and shoulder and arthrotomy of ankle. 8/20 pt in pain, crying, sweating with chills. Subox-one unable to be restarted due to patient being on opioids. Adjusted pain medications for better pain control. Started opioid withdrawal protocol per psych recs. Restarting Abilify and Prozac per psych recs. Patient has had outburst with staff, stating his pain is not being controlled. Acute pain service consulted for further recs. Increased Prozac to 20 mg daily due to patient stating he takes 40 mg at home and his mood swings. Pending LTAC placement at this time.    Interval History: Patient seen and examined by myself this morning. No acute events overnight. Patient agitated that he is still waiting LTAC placement, amenable to waiting until we can secure proper placement for him. Expresses desire to continue to wean off opioids. Will continue to monitor.       Review of Systems   Constitutional: Negative for appetite change and fever.   HENT: Negative for congestion, sore throat and trouble swallowing.    Eyes: Negative for photophobia, pain and discharge.   Respiratory: Negative for cough and shortness of breath.    Cardiovascular: Negative for chest pain and palpitations.   Gastrointestinal: Negative for abdominal pain, diarrhea, nausea and vomiting.   Genitourinary: Negative for difficulty urinating.   Musculoskeletal: Positive for arthralgias and joint swelling. Negative for back pain, myalgias and neck pain.   Skin: Negative for pallor and rash.   Neurological: Negative for light-headedness, numbness and headaches.     Objective:     Vital Signs (Most Recent):  Temp: 98.3 °F (36.8 °C) (08/31/20 1143)  Pulse: 101 (08/31/20 1143)  Resp: 16 (08/31/20 1143)  BP: (!) 97/54 (08/31/20 1143)  SpO2: 95 % (08/31/20 1143) Vital Signs (24h Range):  Temp:  [96.6 °F (35.9 °C)-99.4 °F (37.4 °C)] 98.3  °F (36.8 °C)  Pulse:  [] 101  Resp:  [14-20] 16  SpO2:  [95 %-100 %] 95 %  BP: ()/(54-77) 97/54     Weight: 63.5 kg (140 lb)  Body mass index is 21.29 kg/m².    Intake/Output Summary (Last 24 hours) at 8/31/2020 1323  Last data filed at 8/31/2020 0300  Gross per 24 hour   Intake 1428 ml   Output 3 ml   Net 1425 ml      Physical Exam  Constitutional:       Appearance: Normal appearance.   Eyes:      Conjunctiva/sclera: Conjunctivae normal.      Pupils: Pupils are equal, round, and reactive to light.   Cardiovascular:      Rate and Rhythm: Normal rate and regular rhythm.      Pulses: Normal pulses.      Heart sounds: Normal heart sounds.   Pulmonary:      Effort: Pulmonary effort is normal. No respiratory distress.      Breath sounds: Normal breath sounds.   Abdominal:      General: Bowel sounds are normal. There is no distension.      Palpations: Abdomen is soft.      Tenderness: There is no abdominal tenderness.   Musculoskeletal:         General: Tenderness present.   Skin:     General: Skin is warm.      Findings: No bruising or rash.   Neurological:      Mental Status: He is alert and oriented to person, place, and time.   Psychiatric:         Mood and Affect: Affect is flat.         Behavior: Behavior is agitated.         LABORATORY:    Recent Labs   Lab 08/28/20  0825 08/29/20  0549 08/30/20  0425   WBC 6.16 5.99 4.93   HGB 11.5* 11.3* 11.0*   HCT 37.3* 34.9* 34.4*   * 500* 443*     Recent Labs   Lab 08/29/20  0549 08/30/20  0425 08/31/20  0532    139 140   K 4.1 3.7 4.0    102 104   CO2 27 29 27   BUN 11 13 12   CREATININE 0.7 0.7 0.8    93 109   CALCIUM 9.3 9.2 9.5     Recent Labs   Lab 08/29/20  0549 08/30/20  0425 08/31/20  0532   ALKPHOS 64 66 70   ALT 9* 9* 11   AST 11 13 15   ALBUMIN 3.3* 3.2* 3.7   PROT 7.5 7.1 8.1   BILITOT 0.3 0.2 0.2      No results for input(s): POCTGLUCOSE in the last 168 hours.  No results for input(s): CPK, CPKMB, MB, TROPONINI in the last 72  hours.    No results for input(s): LACTATE in the last 72 hours.     No results for input(s): POCTGLUCOSE in the last 168 hours.  No results found for: LABA1C, HGBA1C      MICROBIOLOGY:  Microbiology Results (last 7 days)     Procedure Component Value Units Date/Time    Culture, Anaerobic [[id number]] Collected: 08/19/20 1805    Order Status: Completed Specimen: Joint Fluid from Shoulder, Left Updated: 08/27/20 0734     Anaerobic Culture No anaerobes isolated    Culture, Anaerobic [[id number]] Collected: 08/19/20 1802    Order Status: Completed Specimen: Joint Fluid from Knee, Right Updated: 08/27/20 0734     Anaerobic Culture No anaerobes isolated    Culture, Anaerobic [[id number]] Collected: 08/19/20 1804    Order Status: Completed Specimen: Joint Fluid from Ankle, Right Updated: 08/27/20 0734     Anaerobic Culture No anaerobes isolated    Fungus culture [[id number]] Collected: 08/19/20 1802    Order Status: Completed Specimen: Joint Fluid from Knee, Right Updated: 08/25/20 1210     Fungus (Mycology) Culture Culture in progress    Fungus culture [[id number]] Collected: 08/19/20 1805    Order Status: Completed Specimen: Joint Fluid from Shoulder, Left Updated: 08/25/20 1210     Fungus (Mycology) Culture Culture in progress    Fungus culture [[id number]] Collected: 08/19/20 1804    Order Status: Completed Specimen: Joint Fluid from Ankle, Right Updated: 08/25/20 1210     Fungus (Mycology) Culture Culture in progress    Blood culture [[id number]] Collected: 08/19/20 1644    Order Status: Completed Specimen: Blood from Peripheral, Antecubital, Right Updated: 08/24/20 1822     Blood Culture, Routine No growth after 5 days.    Blood culture x two cultures. Draw prior to antibiotics. [[id number]] Collected: 08/19/20 1140    Order Status: Completed Specimen: Blood from Peripheral, Antecubital, Right Updated: 08/24/20 1412     Blood Culture, Routine No growth after 5 days.    Narrative:      Aerobic and anaerobic    Blood culture  "x two cultures. Draw prior to antibiotics. [[id number]] Collected: 08/19/20 1141    Order Status: Completed Specimen: Blood from Peripheral, Forearm, Right Updated: 08/24/20 1412     Blood Culture, Routine No growth after 5 days.    Narrative:      Aerobic and anaerobic          IMAGING: I have reviewed all pertinent imaging for the last 24 hours.          Assessment/Plan:      * SIRS (systemic inflammatory response syndrome)  24 year old M with a PMHx of IV drug abuse and depression presented to the ED complaining of left shoulder pain, right knee pain, and right ankle pain. On arrival temp 101.4, pulse 111, elevated CRP and Sed rate. WBC wnl. He has significant decreased ROM of left shoulder and swelling of right knee. Infectious workup started in the ED. Vanc and zosyn given in the ED.    Xray left shoulder, R knee, R ankle, CXR, and US right lower leg: no significant findings    POD 8: arthroscopy R knee and L shoulder, arthrotomy R ankle -  per ortho op-note "I think this is either a very early case of septic arthritis of multiple joints versus inflammatory poly arthritis. Given his febrile state and IV drug abuse I think he should be treated as though the septic arthritis even if his cultures fail to grow anything."    Plan:   -- Ortho consulted for septic arthritis r/o. Follwing recs  -- Aspiration of joints: R knee (31K WBC, 90% segs) R ankle (600 WBC, 43% segs), L shoulder clotted  -- Fluid aspiration gram stain pending  -- Continue Rocephin and Vanc.  -- ID consulted. Following recs.  -- Blood cultures NGTD  -- HIV negative  -- Hepatitis C antibody positive so will need follow up with Infectious Disease in [facility] as outpatient  -- Procalcitonin 0.67  -- Trend ESR and CRP daily  -- Working on placement for continued antibiotics.         Opioid withdrawal  See Opioid use disorder, severe, dependence      Opioid use disorder, severe, dependence  - IV heroin since age of 17, consitent use for past 5 " yrs  - expresses desire to resume subox-one while inpatient  - Addiction Psychiatry consulted and following  - Pain Management Service also consulted for assisstance  - Clonidine 0.1mg PO q4hr PRN for withdrawal associated HTN  - Bentyl 10mg PO q6hr PRN for abdominal muscle cramps  - Loperamide 2mg PO q6hr PRN for diarrhea  - Robaxin 500mg PO q6hr PRN for muscle spasm  - Zofran 4mg ODT q8hr PRN for nausea  - Vistaril 50mg PO qHS PRN for insomnia  - Tylenol 650 mg TID  - Elavil 25 mg QHS  - Abilify 5 mg QD  - Prozac 20 mg QD  - Gabapentin 300 mg BID  - Ibuprofen 800 mg TID with Protonix 40 mg daily    Septic arthritis of right ankle  See SIRS (systemic inflammatory response syndrome)      Septic arthritis of shoulder, left  See SIRS (systemic inflammatory response syndrome)      Septic arthritis of knee, right  See SIRS (systemic inflammatory response syndrome)      Anxiety and depression  Pt reports hx of anxiety/depression on Abilify and Prozac at home. He states he takes Prozac 40 mg daily and Abilify 20 mg daily which is different than how is documented in the chart. Patient is having mood swings with outbursts due to his pain not being controlled. Some of this may be due to his underlying psych history.     Plan:  - Psych consulted for medication recs. Following  - Abilify 5 mg daily  - Prozac 20 mg daily.   - Hydroxyzine Q8H PRN for anxiety and insomnia    Oligoarthritis of multiple sites - L shoulder, R knee, R ankle  IV drug user presented to the ED complaining of left shoulder pain, right knee pain, and right ankle pain. On arrival temp 101.4, pulse 111, elevated CRP and Sed rate. WBC wnl. He has significant decreased ROM of left shoulder and swelling of right knee. He states his pain has been severe, leading him unable to walk for 2 days.    Xray left shoulder, R knee, R ankle, CXR, and US right lower leg: no significant findings    Plan:   - Ortho consulted. Following recs. See SIRS  - Colchicine and  Indomethacin d/eduardo  - Scheduled Tylenol 650 mg TID      IVDU (intravenous drug user)  Pt reports use of IV drugs since the age of 17. States he has consistently used them for 5 years and became sober 6 months ago. He is on Subox-one at home which he last took 3 days ago. He relapsed last week. Last IV opioid use was 3 days ago. Patient has a 1 week old  at home that could be contributing to the relapse. On exam he was tremulous with mildly dilated pupils. He reports recent sweating and cold chills. He denies N/V diarrhea. With his chronic use, he will likely be hyper-analgesic.     Plan:  -- Clinical Opiate Withdrawal Scale: 4  -- Ativan 0.5 mg D/Eduardo  -- Psych consulted for addiction management. Following recs.   -- Continue to monitor for signs of withdrawal  -- See opioid dependence.      Elevated C-reactive protein (CRP)  See SIRS (systemic inflammatory response syndrome)      Elevated erythrocyte sedimentation rate  See SIRS (systemic inflammatory response syndrome)        VTE Risk Mitigation (From admission, onward)         Ordered     Place sequential compression device  Until discontinued      20 1643     IP VTE LOW RISK PATIENT  Once      20 1643                Discharge Planning   TITA: 2020     Code Status: Full Code   Is the patient medically ready for discharge?: Yes    Reason for patient still in hospital (select all that apply): Other (specify) awaiting LTAC placement  Discharge Plan A: Long-term acute care facility (LTAC)   Discharge Delays: (!) Patient and Family Barriers              Jacob Posada MD  Department of Hospital Medicine   [facility]

## 2020-08-31 NOTE — SUBJECTIVE & OBJECTIVE
Principal Problem:SIRS (systemic inflammatory response syndrome)    Principal Orthopedic Problem: s/p L shoulder arthroscopy, R knee arthroscopy and R ankle arthrotomy on 8/19/20    Interval History: The patient was seen and examined at the bedside. NAEON. Tolerating PO intake. VSS. Denies R knee and R ankle pain. L shoulder pain improving further today. CRP trending down to 46 from 63. WBC stable.       Review of patient's allergies indicates:   Allergen Reactions    Penicillins Hives     Unclear of last episode of allergic reaction  Tolerated ceftriaxone 08/2020       Current Facility-Administered Medications   Medication    acetaminophen tablet 650 mg    amitriptyline tablet 25 mg    ARIPiprazole tablet 5 mg    aspirin EC tablet 81 mg    cefTRIAXone (ROCEPHIN) 2 g/50 mL D5W IVPB    cloNIDine tablet 0.1 mg    dextrose 50% injection 12.5 g    dextrose 50% injection 25 g    diazePAM tablet 5 mg    dicyclomine capsule 10 mg    FLUoxetine capsule 20 mg    gabapentin capsule 300 mg    glucagon (human recombinant) injection 1 mg    glucose chewable tablet 16 g    glucose chewable tablet 24 g    hydrOXYzine pamoate capsule 50 mg    ibuprofen tablet 800 mg    lidocaine 5 % patch 1 patch    loperamide capsule 2 mg    melatonin tablet 6 mg    methocarbamoL tablet 750 mg    naloxone 0.4 mg/mL injection 0.4 mg    nicotine 21 mg/24 hr 1 patch    ondansetron disintegrating tablet 8 mg    pantoprazole EC tablet 40 mg    prochlorperazine tablet 10 mg    senna-docusate 8.6-50 mg per tablet 1 tablet    sodium chloride 0.9% flush 10 mL    vancomycin (VANCOCIN) 2,250 mg in dextrose 5 % 500 mL IVPB     Objective:     Vital Signs (Most Recent):  Temp: 98.3 °F (36.8 °C) (08/31/20 1143)  Pulse: 88 (08/31/20 1331)  Resp: 18 (08/31/20 1331)  BP: (!) 97/54 (08/31/20 1143)  SpO2: 97 % (08/31/20 1331) Vital Signs (24h Range):  Temp:  [96.6 °F (35.9 °C)-98.5 °F (36.9 °C)] 98.3 °F (36.8 °C)  Pulse:  []  "88  Resp:  [16-20] 18  SpO2:  [95 %-100 %] 97 %  BP: ()/(54-77) 97/54     Weight: 63.5 kg (140 lb)  Height: 5' 8" (172.7 cm)  Body mass index is 21.29 kg/m².      Intake/Output Summary (Last 24 hours) at 8/31/2020 1801  Last data filed at 8/31/2020 0300  Gross per 24 hour   Intake 948 ml   Output --   Net 948 ml       Ortho/SPM Exam     LUE  Pain with shoulder ROM improving  SILT M/U/R   Motor intact AIN/PIN/M/U/R   Cap refill < 2s  2+ RP    RLE:  Painless knee and ankle ROM  SILT Sa/Bell/DP/SP/T  Motor intact EHL/FHL/TA/Gastroc  2+ DP, 2+ PT          Significant Labs:   CBC:   Recent Labs   Lab 08/30/20  0425   WBC 4.93   HGB 11.0*   HCT 34.4*   *     CMP:   Recent Labs   Lab 08/30/20  0425 08/31/20  0532    140   K 3.7 4.0    104   CO2 29 27   GLU 93 109   BUN 13 12   CREATININE 0.7 0.8   CALCIUM 9.2 9.5   PROT 7.1 8.1   ALBUMIN 3.2* 3.7   BILITOT 0.2 0.2   ALKPHOS 66 70   AST 13 15   ALT 9* 11   ANIONGAP 8 9   EGFRNONAA >60.0 >60.0     CRP:   Recent Labs   Lab 08/30/20  0425 08/31/20  0532   CRP 7.5 6.5     All pertinent labs within the past 24 hours have been reviewed.    Significant Imaging: I have reviewed all pertinent imaging results/findings.  "

## 2020-08-31 NOTE — ASSESSMENT & PLAN NOTE
Pt reports use of IV drugs since the age of 17. States he has consistently used them for 5 years and became sober 6 months ago. He is on Subox-one at home which he last took 3 days ago. He relapsed last week. Last IV opioid use was 3 days ago. Patient has a 1 week old  at home that could be contributing to the relapse. On exam he was tremulous with mildly dilated pupils. He reports recent sweating and cold chills. He denies N/V diarrhea. With his chronic use, he will likely be hyper-analgesic.     Plan:  -- Clinical Opiate Withdrawal Scale: 4  -- Ativan 0.5 mg D/Morgan  -- Psych consulted for addiction management. Following recs.   -- Continue to monitor for signs of withdrawal  -- See opioid dependence.

## 2020-08-31 NOTE — PROGRESS NOTES
[facility]  Orthopedics  Progress Note    Patient Name: Bebo Koroma  MRN: [id number]  Admission Date: 8/19/2020  Hospital Length of Stay: 11 days  Attending Provider: Tariq Barrios MD  Primary Care Provider: Primary Doctor No  Follow-up For: Procedure(s) (LRB):  ARTHROSCOPY, KNEE - cultures & 9 liters saline - arthroscopic leg lozada  (Right)  ARTHROSCOPY, SHOULDER - cultures & 9 liters saline (Left)  ARTHROTOMY, ANKLE - cultures & 9 liters of saline - cysto tubing (Right)    Post-Operative Day: 12 Days Post-Op  Subjective:     Principal Problem:SIRS (systemic inflammatory response syndrome)    Principal Orthopedic Problem: s/p L shoulder arthroscopy, R knee arthroscopy and R ankle arthrotomy on 8/19/20    Interval History: The patient was seen and examined at the bedside. NAEON. Tolerating PO intake. VSS. Denies R knee and R ankle pain. L shoulder pain improving further today. Sutures removed today, wounds almost fully healed.    Review of patient's allergies indicates:   Allergen Reactions    Penicillins Hives     Unclear of last episode of allergic reaction  Tolerated ceftriaxone 08/2020       Current Facility-Administered Medications   Medication    acetaminophen tablet 650 mg    amitriptyline tablet 25 mg    ARIPiprazole tablet 5 mg    aspirin EC tablet 81 mg    cefTRIAXone (ROCEPHIN) 2 g/50 mL D5W IVPB    cloNIDine tablet 0.1 mg    dextrose 50% injection 12.5 g    dextrose 50% injection 25 g    diazePAM tablet 5 mg    dicyclomine capsule 10 mg    FLUoxetine capsule 20 mg    gabapentin capsule 300 mg    glucagon (human recombinant) injection 1 mg    glucose chewable tablet 16 g    glucose chewable tablet 24 g    hydrOXYzine pamoate capsule 50 mg    ibuprofen tablet 800 mg    lidocaine 5 % patch 1 patch    loperamide capsule 2 mg    melatonin tablet 6 mg    methocarbamoL tablet 750 mg    naloxone 0.4 mg/mL injection 0.4 mg    nicotine 21 mg/24 hr 1 patch     "ondansetron disintegrating tablet 8 mg    pantoprazole EC tablet 40 mg    prochlorperazine tablet 10 mg    senna-docusate 8.6-50 mg per tablet 1 tablet    sodium chloride 0.9% flush 10 mL    vancomycin (VANCOCIN) 2,250 mg in dextrose 5 % 500 mL IVPB     Objective:     Vital Signs (Most Recent):  Temp: 98.3 °F (36.8 °C) (08/31/20 1143)  Pulse: 88 (08/31/20 1331)  Resp: 18 (08/31/20 1331)  BP: (!) 97/54 (08/31/20 1143)  SpO2: 97 % (08/31/20 1331) Vital Signs (24h Range):  Temp:  [96.6 °F (35.9 °C)-98.5 °F (36.9 °C)] 98.3 °F (36.8 °C)  Pulse:  [] 88  Resp:  [16-20] 18  SpO2:  [95 %-100 %] 97 %  BP: ()/(54-77) 97/54     Weight: 63.5 kg (140 lb)  Height: 5' 8" (172.7 cm)  Body mass index is 21.29 kg/m².      Intake/Output Summary (Last 24 hours) at 8/31/2020 1801  Last data filed at 8/31/2020 0300  Gross per 24 hour   Intake 948 ml   Output --   Net 948 ml       Ortho/SPM Exam     LUE  Pain with shoulder ROM improving  SILT M/U/R   Motor intact AIN/PIN/M/U/R   Cap refill < 2s  2+ RP    RLE:  Painless knee and ankle ROM  SILT Sa/Bell/DP/SP/T  Motor intact EHL/FHL/TA/Gastroc  2+ DP, 2+ PT          Significant Labs:   CBC:   Recent Labs   Lab 08/30/20  0425   WBC 4.93   HGB 11.0*   HCT 34.4*   *     CMP:   Recent Labs   Lab 08/30/20 0425 08/31/20  0532    140   K 3.7 4.0    104   CO2 29 27   GLU 93 109   BUN 13 12   CREATININE 0.7 0.8   CALCIUM 9.2 9.5   PROT 7.1 8.1   ALBUMIN 3.2* 3.7   BILITOT 0.2 0.2   ALKPHOS 66 70   AST 13 15   ALT 9* 11   ANIONGAP 8 9   EGFRNONAA >60.0 >60.0     CRP:   Recent Labs   Lab 08/30/20  0425 08/31/20  0532   CRP 7.5 6.5     All pertinent labs within the past 24 hours have been reviewed.    Significant Imaging: I have reviewed all pertinent imaging results/findings.    Assessment/Plan:     Septic arthritis of knee, right  Bebo Koroma is a 24 y.o. male IVDU presenting with fevers, left shoulder pain, right knee pain, right ankle pain s/p L shoulder " arthroscopy, R knee arthroscopy and R ankle arthrotomy on 8/19/20. R knee and R ankle pain well controlled. L shoulder pain improving. Sutures removed. Wounds healing well.    -Follow cultures, ngtd  -PT/OT WBAT in all joints  -Abx, continue vanc/rocephin;  ID rec 4 weeks iv abx;   -DVT asa 81 daily per medicine team  -No plans for additional operative intervention at this time.      Dispo: ltac placement for iv abx          Tian Bucio MD  Orthopedics  [facility]

## 2020-08-31 NOTE — ASSESSMENT & PLAN NOTE
- IV heroin since age of 17, consitent use for past 5 yrs  - expresses desire to resume subox-one while inpatient  - Addiction Psychiatry consulted and following  - Pain Management Service also consulted for assisstance  - Clonidine 0.1mg PO q4hr PRN for withdrawal associated HTN  - Bentyl 10mg PO q6hr PRN for abdominal muscle cramps  - Loperamide 2mg PO q6hr PRN for diarrhea  - Robaxin 500mg PO q6hr PRN for muscle spasm  - Zofran 4mg ODT q8hr PRN for nausea  - Vistaril 50mg PO qHS PRN for insomnia  - Tylenol 650 mg TID  - Elavil 25 mg QHS  - Abilify 5 mg QD  - Prozac 20 mg QD  - Gabapentin 300 mg BID  - Ibuprofen 800 mg TID with Protonix 40 mg daily

## 2020-08-31 NOTE — PLAN OF CARE
Problem: Fall Injury Risk  Goal: Absence of Fall and Fall-Related Injury  Outcome: Ongoing, Progressing     Problem: Adult Inpatient Plan of Care  Goal: Plan of Care Review  Outcome: Ongoing, Progressing  Goal: Patient-Specific Goal (Individualization)  Outcome: Ongoing, Progressing  Goal: Absence of Hospital-Acquired Illness or Injury  Outcome: Ongoing, Progressing  Goal: Optimal Comfort and Wellbeing  Outcome: Ongoing, Progressing  Goal: Readiness for Transition of Care  Outcome: Ongoing, Progressing  Goal: Rounds/Family Conference  Outcome: Ongoing, Progressing     Problem: Skin Injury Risk Increased  Goal: Skin Health and Integrity  Outcome: Ongoing, Progressing    Plan of care reviewed with pt. Pt verbalized understanding. Vs remains stable. Pt given prn medications for pain. Pt refused iv rotation  Pt is free of falls and injuries. Bed in lowest position and call light within reach . I will continue to monitor.

## 2020-08-31 NOTE — ASSESSMENT & PLAN NOTE
IV drug user presented to the ED complaining of left shoulder pain, right knee pain, and right ankle pain. On arrival temp 101.4, pulse 111, elevated CRP and Sed rate. WBC wnl. He has significant decreased ROM of left shoulder and swelling of right knee. He states his pain has been severe, leading him unable to walk for 2 days.    Xray left shoulder, R knee, R ankle, CXR, and US right lower leg: no significant findings    Plan:   - Ortho consulted. Following recs. See SIRS  - Colchicine and Indomethacin d/eduardo  - Scheduled Tylenol 650 mg TID

## 2020-08-31 NOTE — PLAN OF CARE
Patient has been denied for LTAC placement by [facility] because they are unable to meet pt's needs. Pt has been denied by [facility]. VALERIE reached out multiple times to [facility]. Pt denied due to lack of response.    VALERIE informed Marietta with hospitals. Marietta will submit to insurance for contract agreement.     12:27 PM  VALERIE received call from Autumn with [facility] ([phone]) who is assisting with discharge planning.  Autumn requested a list of LTAC denials, which VALERIE provided. Autumn requested an LTAC referral be faxed to [facility] at [phone]. Autumn also requested a referral be sent to [facility] in [place] and [facility]. Autumn states that she will also follow up with LTAC referrals to obtain decisions.     Nany Rodriguez, RAMONA  [facility]

## 2020-08-31 NOTE — ASSESSMENT & PLAN NOTE
Pt reports hx of anxiety/depression on Abilify and Prozac at home. He states he takes Prozac 40 mg daily and Abilify 20 mg daily which is different than how is documented in the chart. Patient is having mood swings with outbursts due to his pain not being controlled. Some of this may be due to his underlying psych history.     Plan:  - Psych consulted for medication recs. Following  - Abilify 5 mg daily  - Prozac 20 mg daily.   - Hydroxyzine Q8H PRN for anxiety and insomnia

## 2020-08-31 NOTE — ASSESSMENT & PLAN NOTE
Bebo Koroma is a 24 y.o. male IVDU presenting with fevers, left shoulder pain, right knee pain, right ankle pain s/p L shoulder arthroscopy, R knee arthroscopy and R ankle arthrotomy on 8/19/20. R knee and R ankle pain well controlled. L shoulder pain improving. Sutures removed. Wounds healing well.    -Follow cultures, ngtd  -PT/OT WBAT in all joints  -Abx, continue vanc/rocephin;  ID rec 4 weeks iv abx;   -DVT asa 81 daily per medicine team  -No plans for additional operative intervention at this time.      Dispo: ltac placement for iv abx

## 2020-08-31 NOTE — SUBJECTIVE & OBJECTIVE
Interval History: Patient seen and examined by myself this morning. No acute events overnight. Patient agitated that he is still waiting LTAC placement, amenable to waiting until we can secure proper placement for him. Expresses desire to continue to wean off opioids. Will continue to monitor.       Review of Systems   Constitutional: Negative for appetite change and fever.   HENT: Negative for congestion, sore throat and trouble swallowing.    Eyes: Negative for photophobia, pain and discharge.   Respiratory: Negative for cough and shortness of breath.    Cardiovascular: Negative for chest pain and palpitations.   Gastrointestinal: Negative for abdominal pain, diarrhea, nausea and vomiting.   Genitourinary: Negative for difficulty urinating.   Musculoskeletal: Positive for arthralgias and joint swelling. Negative for back pain, myalgias and neck pain.   Skin: Negative for pallor and rash.   Neurological: Negative for light-headedness, numbness and headaches.     Objective:     Vital Signs (Most Recent):  Temp: 98.3 °F (36.8 °C) (08/31/20 1143)  Pulse: 101 (08/31/20 1143)  Resp: 16 (08/31/20 1143)  BP: (!) 97/54 (08/31/20 1143)  SpO2: 95 % (08/31/20 1143) Vital Signs (24h Range):  Temp:  [96.6 °F (35.9 °C)-99.4 °F (37.4 °C)] 98.3 °F (36.8 °C)  Pulse:  [] 101  Resp:  [14-20] 16  SpO2:  [95 %-100 %] 95 %  BP: ()/(54-77) 97/54     Weight: 63.5 kg (140 lb)  Body mass index is 21.29 kg/m².    Intake/Output Summary (Last 24 hours) at 8/31/2020 1323  Last data filed at 8/31/2020 0300  Gross per 24 hour   Intake 1428 ml   Output 3 ml   Net 1425 ml      Physical Exam  Constitutional:       Appearance: Normal appearance.   Eyes:      Conjunctiva/sclera: Conjunctivae normal.      Pupils: Pupils are equal, round, and reactive to light.   Cardiovascular:      Rate and Rhythm: Normal rate and regular rhythm.      Pulses: Normal pulses.      Heart sounds: Normal heart sounds.   Pulmonary:      Effort: Pulmonary effort  is normal. No respiratory distress.      Breath sounds: Normal breath sounds.   Abdominal:      General: Bowel sounds are normal. There is no distension.      Palpations: Abdomen is soft.      Tenderness: There is no abdominal tenderness.   Musculoskeletal:         General: Tenderness present.   Skin:     General: Skin is warm.      Findings: No bruising or rash.   Neurological:      Mental Status: He is alert and oriented to person, place, and time.   Psychiatric:         Mood and Affect: Affect is flat.         Behavior: Behavior is agitated.         LABORATORY:    Recent Labs   Lab 08/28/20  0825 08/29/20  0549 08/30/20  0425   WBC 6.16 5.99 4.93   HGB 11.5* 11.3* 11.0*   HCT 37.3* 34.9* 34.4*   * 500* 443*     Recent Labs   Lab 08/29/20  0549 08/30/20  0425 08/31/20  0532    139 140   K 4.1 3.7 4.0    102 104   CO2 27 29 27   BUN 11 13 12   CREATININE 0.7 0.7 0.8    93 109   CALCIUM 9.3 9.2 9.5     Recent Labs   Lab 08/29/20  0549 08/30/20  0425 08/31/20  0532   ALKPHOS 64 66 70   ALT 9* 9* 11   AST 11 13 15   ALBUMIN 3.3* 3.2* 3.7   PROT 7.5 7.1 8.1   BILITOT 0.3 0.2 0.2      No results for input(s): POCTGLUCOSE in the last 168 hours.  No results for input(s): CPK, CPKMB, MB, TROPONINI in the last 72 hours.    No results for input(s): LACTATE in the last 72 hours.     No results for input(s): POCTGLUCOSE in the last 168 hours.  No results found for: LABA1C, HGBA1C      MICROBIOLOGY:  Microbiology Results (last 7 days)     Procedure Component Value Units Date/Time    Culture, Anaerobic [[id number]] Collected: 08/19/20 1805    Order Status: Completed Specimen: Joint Fluid from Shoulder, Left Updated: 08/27/20 0734     Anaerobic Culture No anaerobes isolated    Culture, Anaerobic [[id number]] Collected: 08/19/20 1802    Order Status: Completed Specimen: Joint Fluid from Knee, Right Updated: 08/27/20 0734     Anaerobic Culture No anaerobes isolated    Culture, Anaerobic [[id number]]  Collected: 08/19/20 1804    Order Status: Completed Specimen: Joint Fluid from Ankle, Right Updated: 08/27/20 0734     Anaerobic Culture No anaerobes isolated    Fungus culture [[id number]] Collected: 08/19/20 1802    Order Status: Completed Specimen: Joint Fluid from Knee, Right Updated: 08/25/20 1210     Fungus (Mycology) Culture Culture in progress    Fungus culture [[id number]] Collected: 08/19/20 1805    Order Status: Completed Specimen: Joint Fluid from Shoulder, Left Updated: 08/25/20 1210     Fungus (Mycology) Culture Culture in progress    Fungus culture [[id number]] Collected: 08/19/20 1804    Order Status: Completed Specimen: Joint Fluid from Ankle, Right Updated: 08/25/20 1210     Fungus (Mycology) Culture Culture in progress    Blood culture [[id number]] Collected: 08/19/20 1644    Order Status: Completed Specimen: Blood from Peripheral, Antecubital, Right Updated: 08/24/20 1822     Blood Culture, Routine No growth after 5 days.    Blood culture x two cultures. Draw prior to antibiotics. [[id number]] Collected: 08/19/20 1140    Order Status: Completed Specimen: Blood from Peripheral, Antecubital, Right Updated: 08/24/20 1412     Blood Culture, Routine No growth after 5 days.    Narrative:      Aerobic and anaerobic    Blood culture x two cultures. Draw prior to antibiotics. [[id number]] Collected: 08/19/20 1141    Order Status: Completed Specimen: Blood from Peripheral, Forearm, Right Updated: 08/24/20 1412     Blood Culture, Routine No growth after 5 days.    Narrative:      Aerobic and anaerobic          IMAGING: I have reviewed all pertinent imaging for the last 24 hours.

## 2020-09-01 VITALS
HEIGHT: 68 IN | OXYGEN SATURATION: 100 % | WEIGHT: 140 LBS | HEART RATE: 77 BPM | BODY MASS INDEX: 21.22 KG/M2 | DIASTOLIC BLOOD PRESSURE: 54 MMHG | RESPIRATION RATE: 16 BRPM | SYSTOLIC BLOOD PRESSURE: 89 MMHG | TEMPERATURE: 96 F

## 2020-09-01 LAB
ALBUMIN SERPL BCP-MCNC: 3.3 G/DL (ref 3.5–5.2)
ALP SERPL-CCNC: 64 U/L (ref 55–135)
ALT SERPL W/O P-5'-P-CCNC: 9 U/L (ref 10–44)
ANION GAP SERPL CALC-SCNC: 10 MMOL/L (ref 8–16)
AST SERPL-CCNC: 11 U/L (ref 10–40)
BILIRUB SERPL-MCNC: 0.2 MG/DL (ref 0.1–1)
BUN SERPL-MCNC: 13 MG/DL (ref 6–20)
CALCIUM SERPL-MCNC: 9.4 MG/DL (ref 8.7–10.5)
CHLORIDE SERPL-SCNC: 103 MMOL/L (ref 95–110)
CO2 SERPL-SCNC: 26 MMOL/L (ref 23–29)
CREAT SERPL-MCNC: 1 MG/DL (ref 0.5–1.4)
CRP SERPL-MCNC: 5.6 MG/L (ref 0–8.2)
ERYTHROCYTE [SEDIMENTATION RATE] IN BLOOD BY WESTERGREN METHOD: 49 MM/HR (ref 0–23)
EST. GFR  (AFRICAN AMERICAN): >60 ML/MIN/1.73 M^2
EST. GFR  (NON AFRICAN AMERICAN): >60 ML/MIN/1.73 M^2
GLUCOSE SERPL-MCNC: 97 MG/DL (ref 70–110)
POTASSIUM SERPL-SCNC: 4.1 MMOL/L (ref 3.5–5.1)
PROT SERPL-MCNC: 7.1 G/DL (ref 6–8.4)
SODIUM SERPL-SCNC: 139 MMOL/L (ref 136–145)
VANCOMYCIN TROUGH SERPL-MCNC: 22.7 UG/ML (ref 10–22)

## 2020-09-01 PROCEDURE — 80202 ASSAY OF VANCOMYCIN: CPT

## 2020-09-01 PROCEDURE — 86140 C-REACTIVE PROTEIN: CPT

## 2020-09-01 PROCEDURE — 25000003 PHARM REV CODE 250: Performed by: ORTHOPAEDIC SURGERY

## 2020-09-01 PROCEDURE — 80053 COMPREHEN METABOLIC PANEL: CPT

## 2020-09-01 PROCEDURE — 25000003 PHARM REV CODE 250: Performed by: INTERNAL MEDICINE

## 2020-09-01 PROCEDURE — 63600175 PHARM REV CODE 636 W HCPCS: Performed by: STUDENT IN AN ORGANIZED HEALTH CARE EDUCATION/TRAINING PROGRAM

## 2020-09-01 PROCEDURE — 25000003 PHARM REV CODE 250: Performed by: STUDENT IN AN ORGANIZED HEALTH CARE EDUCATION/TRAINING PROGRAM

## 2020-09-01 PROCEDURE — 85652 RBC SED RATE AUTOMATED: CPT

## 2020-09-01 PROCEDURE — 94761 N-INVAS EAR/PLS OXIMETRY MLT: CPT

## 2020-09-01 PROCEDURE — 36415 COLL VENOUS BLD VENIPUNCTURE: CPT

## 2020-09-01 PROCEDURE — 99239 PR HOSPITAL DISCHARGE DAY,>30 MIN: ICD-10-PCS | Mod: ,,, | Performed by: STUDENT IN AN ORGANIZED HEALTH CARE EDUCATION/TRAINING PROGRAM

## 2020-09-01 PROCEDURE — 99239 HOSP IP/OBS DSCHRG MGMT >30: CPT | Mod: ,,, | Performed by: STUDENT IN AN ORGANIZED HEALTH CARE EDUCATION/TRAINING PROGRAM

## 2020-09-01 RX ADMIN — METHOCARBAMOL TABLETS 750 MG: 750 TABLET, COATED ORAL at 11:09

## 2020-09-01 RX ADMIN — ACETAMINOPHEN 650 MG: 325 TABLET ORAL at 02:09

## 2020-09-01 RX ADMIN — DIAZEPAM 5 MG: 5 TABLET ORAL at 08:09

## 2020-09-01 RX ADMIN — ARIPIPRAZOLE 5 MG: 5 TABLET ORAL at 08:09

## 2020-09-01 RX ADMIN — IBUPROFEN 800 MG: 400 TABLET, FILM COATED ORAL at 08:09

## 2020-09-01 RX ADMIN — ACETAMINOPHEN 650 MG: 325 TABLET ORAL at 08:09

## 2020-09-01 RX ADMIN — METHOCARBAMOL TABLETS 750 MG: 750 TABLET, COATED ORAL at 05:09

## 2020-09-01 RX ADMIN — PANTOPRAZOLE SODIUM 40 MG: 40 TABLET, DELAYED RELEASE ORAL at 08:09

## 2020-09-01 RX ADMIN — GABAPENTIN 300 MG: 300 CAPSULE ORAL at 08:09

## 2020-09-01 RX ADMIN — VANCOMYCIN HYDROCHLORIDE 2000 MG: 10 INJECTION, POWDER, LYOPHILIZED, FOR SOLUTION INTRAVENOUS at 02:09

## 2020-09-01 RX ADMIN — CEFTRIAXONE 2 G: 2 INJECTION, SOLUTION INTRAVENOUS at 09:09

## 2020-09-01 RX ADMIN — VANCOMYCIN HYDROCHLORIDE 2250 MG: 1.25 INJECTION, POWDER, LYOPHILIZED, FOR SOLUTION INTRAVENOUS at 02:09

## 2020-09-01 RX ADMIN — METHOCARBAMOL TABLETS 750 MG: 750 TABLET, COATED ORAL at 12:09

## 2020-09-01 RX ADMIN — IBUPROFEN 800 MG: 400 TABLET, FILM COATED ORAL at 02:09

## 2020-09-01 RX ADMIN — ASPIRIN 81 MG: 81 TABLET, COATED ORAL at 08:09

## 2020-09-01 RX ADMIN — FLUOXETINE 20 MG: 20 CAPSULE ORAL at 09:09

## 2020-09-01 NOTE — PROGRESS NOTES
Pharmacokinetic Assessment Follow Up: IV Vancomycin    Vancomycin serum concentration assessment(s):    Vancomycin trough = 22.7 mcg/mL.  Drawn correctly and can be used to guide therapy    Vancomycin Regimen Plan:  1. Decrease to vancomycin 2000 mg IV q12h  2. Trough prior to dose due @ 0230 on 9/3/20  3. Goal trough = 15-20 mcg/mL    Drug levels (last 3 results):  Recent Labs   Lab Result Units 08/30/20  1203 09/01/20  0215   Vancomycin-Trough ug/mL 11.8 22.7*       Thank you for the consult, will continue to follow  Staci Wiley.D., BCPS  [id number]      Patient brief summary:  Bebo Koroma is a 24 y.o. male initiated on antimicrobial therapy with IV Vancomycin for treatment of R knee septic arthritis      Drug Allergies:   Review of patient's allergies indicates:   Allergen Reactions    Penicillins Hives     Unclear of last episode of allergic reaction  Tolerated ceftriaxone 08/2020       Actual Body Weight:   63.5 kg    Renal Function:   Estimated Creatinine Clearance: 103.2 mL/min (based on SCr of 1 mg/dL).,       CBC (last 72 hours):  Recent Labs   Lab Result Units 08/30/20  0425   WBC K/uL 4.93   Hemoglobin g/dL 11.0*   Hematocrit % 34.4*   Platelets K/uL 443*   Gran% % 38.3   Lymph% % 37.5   Mono% % 18.5*   Eosinophil% % 4.3   Basophil% % 1.2   Differential Method  Automated       Metabolic Panel (last 72 hours):  Recent Labs   Lab Result Units 08/30/20  0425 08/31/20  0532 09/01/20  0215   Sodium mmol/L 139 140 139   Potassium mmol/L 3.7 4.0 4.1   Chloride mmol/L 102 104 103   CO2 mmol/L 29 27 26   Glucose mg/dL 93 109 97   BUN, Bld mg/dL 13 12 13   Creatinine mg/dL 0.7 0.8 1.0   Albumin g/dL 3.2* 3.7 3.3*   Total Bilirubin mg/dL 0.2 0.2 0.2   Alkaline Phosphatase U/L 66 70 64   AST U/L 13 15 11   ALT U/L 9* 11 9*       Vancomycin Administrations:  vancomycin given in the last 96 hours                   vancomycin 1.25 g in dextrose 5% 250 mL IVPB (ready to mix) (mg) 1,250 mg New Bag 08/28/20 1426      1,250 mg New Bag  0536     1,250 mg New Bag 08/27/20 2230     1,250 mg New Bag  1249    vancomycin 1.5 g in dextrose 5 % 250 mL IVPB (ready to mix) (mg) 1,500 mg New Bag 08/27/20 0209     1,500 mg New Bag 08/26/20 1749     1,500 mg New Bag  0718    vancomycin 1.5 g in dextrose 5 % 250 mL IVPB (ready to mix) (mg) 1,500 mg New Bag 08/25/20 2327    vancomycin 1.5 g in dextrose 5 % 250 mL IVPB (ready to mix) (mg) 1,500 mg New Bag 08/25/20 0642     1,500 mg New Bag 08/24/20 2200                Microbiologic Results:  Microbiology Results (last 7 days)     Procedure Component Value Units Date/Time    Culture, Anaerobic [[id number]] Collected: 08/19/20 1805    Order Status: Completed Specimen: Joint Fluid from Shoulder, Left Updated: 08/27/20 0734     Anaerobic Culture No anaerobes isolated    Culture, Anaerobic [[id number]] Collected: 08/19/20 1802    Order Status: Completed Specimen: Joint Fluid from Knee, Right Updated: 08/27/20 0734     Anaerobic Culture No anaerobes isolated    Culture, Anaerobic [[id number]] Collected: 08/19/20 1804    Order Status: Completed Specimen: Joint Fluid from Ankle, Right Updated: 08/27/20 0734     Anaerobic Culture No anaerobes isolated    Fungus culture [[id number]] Collected: 08/19/20 1802    Order Status: Completed Specimen: Joint Fluid from Knee, Right Updated: 08/25/20 1210     Fungus (Mycology) Culture Culture in progress    Fungus culture [[id number]] Collected: 08/19/20 1805    Order Status: Completed Specimen: Joint Fluid from Shoulder, Left Updated: 08/25/20 1210     Fungus (Mycology) Culture Culture in progress    Fungus culture [[id number]] Collected: 08/19/20 1804    Order Status: Completed Specimen: Joint Fluid from Ankle, Right Updated: 08/25/20 1210     Fungus (Mycology) Culture Culture in progress

## 2020-09-01 NOTE — PLAN OF CARE
Pt ok to d/c without PICC and can have a peripheal line. Sw to follow with transport and number for report.

## 2020-09-01 NOTE — PLAN OF CARE
"   09/01/20 0849   Post-Acute Status   Post-Acute Authorization Placement   Post-Acute Placement Status Referrals Sent   Discharge Delays (!) Patient and Family Barriers     [facility] per Kamran with admissions but informed that he could not submit for auth as someone has the auth and needs to release. Sw asked Marietta with [facility], "she will release the auth and allow [facility] to submit. Orders given to [facility] [place].   "

## 2020-09-01 NOTE — PROGRESS NOTES
"[facility]  Adult Nutrition  Progress Note    SUMMARY       Recommendations    1. Continue regular diet as tolerated.   2. Please get an updated weight on Pt.     Goals: 1. Pt to meet % EEN and EPN by RD follow-up.   Nutrition Goal Status: new  Communication of RD Recs: other (comment)    Reason for Assessment    Reason For Assessment: length of stay  Diagnosis: infection/sepsis  Relevant Medical History: depression and IVDA, septic arthritis  Interdisciplinary Rounds: did not attend  General Information Comments: Pt is a 23 yo male presenting with fevers, shoulder pain, right knee pain and ankle pain, s/p shoulder, right knee, and right ankle arthroscopy. Pts last weight is from 8/19, unable to assess for weight loss . Pt has been eating about 75% of meals. NFPE not completed at this time.   Nutrition Discharge Planning: continue regular diet    Nutrition Risk Screen    Nutrition Risk Screen: no indicators present    Nutrition/Diet History    Spiritual, Cultural Beliefs, Latter-day Practices, Values that Affect Care: no    Anthropometrics    Temp: 98 °F (36.7 °C)  Height Method: Stated  Height: 5' 8" (172.7 cm)  Height (inches): 68 in  Weight Method: Stated  Weight: 63.5 kg (139 lb 15.9 oz)  Weight (lb): 139.99 lb  Ideal Body Weight (IBW), Male: 154 lb  % Ideal Body Weight, Male (lb): 90.91 %  BMI (Calculated): 21.3       Lab/Procedures/Meds    Pertinent Labs Reviewed: reviewed  Pertinent Labs Comments: Alb 3.3  Pertinent Medications Reviewed: reviewed         Estimated/Assessed Needs    Weight Used For Calorie Calculations: 63.5 kg (139 lb 15.9 oz)  Energy Calorie Requirements (kcal): 0823-6369 (1.2-1.4)  Energy Need Method: Dewey-St Jeor  Protein Requirements: 63-74  Weight Used For Protein Calculations: 63.5 kg (139 lb 15.9 oz)     Estimated Fluid Requirement Method: RDA Method  RDA Method (mL): 1925         Nutrition Prescription Ordered    Current Diet Order: Regular " diet    Evaluation of Received Nutrient/Fluid Intake    I/O: +3.9L since admit  Comments: LBM 8/31  Tolerance: tolerating  % Meal Intake: 75%    Nutrition Risk    Level of Risk/Frequency of Follow-up: low     Assessment and Plan    No nutrition diagnosis at this time.      Monitor and Evaluation    Food and Nutrient Intake: energy intake, food and beverage intake  Food and Nutrient Adminstration: diet order  Physical Activity and Function: nutrition-related ADLs and IADLs  Anthropometric Measurements: weight, weight change  Biochemical Data, Medical Tests and Procedures: electrolyte and renal panel, gastrointestinal profile, glucose/endocrine profile, inflammatory profile, lipid profile  Nutrition-Focused Physical Findings: overall appearance               Nutrition Follow-Up    RD Follow-up?: Yes

## 2020-09-01 NOTE — DISCHARGE SUMMARY
[facility] Hospital Medicine  Discharge Summary      Patient Name: Bebo Koroma  MRN: [id number]  Admission Date: 8/19/2020  Hospital Length of Stay: 12 days  Discharge Date and Time: 9/1/2020  5:04 PM  Attending Physician: Cheri att. providers found   Discharging Provider: Jay Lin MD  Primary Care Provider: [facility] Medicine Team: [facility] 2 Jay Lin MD    HPI:   Mr. Koroma is a 24 year old M with a PMHx of IV drug abuse and depression that presented to the ED complaining of left shoulder pain, right knee pain, and right ankle pain. Pain started when the patient woke up about 3 days ago, mostly left shoulder pain at that time. He then developed right ankle pain and right knee pain. He describes the pain as constant, sharp and worse with movement. Shoulder pain radiates down to the antecubital fossa region. He tried taking Tylenol with no relief. He has experienced significant decrease in ROM of left shoulder and states that he could not walk for 2 days due to his pain. Reports subjective fevers, chills, fatigue, general weakness, night sweats. Denies N/V, chest pain, SOB, abdominal pain, diarrhea, pain with urination. Patient reports that he last used IV opioids about 3 days ago. Injects into the left arm. On Subox-one at home but has not taken in 3 days. Denies alcohol use and other recreational drugs.     Pt arrived to ED with temp of 101.4, tachycardic elevated CRP and ESR. WBC and lactate wnl. Blood cultures done and 1 dose of vanc and zosyn given along with IVF. Xray L shoulder with no significant findings.     Procedure(s) (LRB):  ARTHROSCOPY, KNEE - cultures & 9 liters saline - arthroscopic leg lozada  (Right)  ARTHROSCOPY, SHOULDER - cultures & 9 liters saline (Left)  ARTHROTOMY, ANKLE - cultures & 9 liters of saline - cysto tubing (Right)      Hospital Course:   Pt admitted to  2 for further management of sepsis (source unknown at the time).  Ortho consulted to r/o septic arthritis. Xray of right knee and ankle ordered. Psych consulted for management of addiction and medications (on Abilify, Prozac, and Subox-one at home). R knee tapped (31K WBC, 90% segs) R ankle tapped (600 WBC, 43% seg), L shoulder tapped (clotted). Cultures pending, NGTD. Taken to the OR on 8/19 for arthroscopy of knee and shoulder and arthrotomy of ankle. 8/20 pt in pain, crying, sweating with chills. Suboxone unable to be restarted due to patient being on opioids. Adjusted pain medications for better pain control. Started opioid withdrawal protocol per psych recs. Restarting Abilify and Prozac per psych recs. Patient has had outburst with staff, stating his pain is not being controlled. Acute pain service consulted for further recs. Increased Prozac to 20 mg daily due to patient stating he takes 40 mg at home and his mood swings. Spoke with psychiatry who agreed with decision to start low dose valium to help with acute anxiety and help pt remain calm and cooperative while awaiting LTAC placement. Cx NGTD. Discharged on vancomycin IV 1250mg q8hr and ceftriaxone 5ky27th with end date of 9/16. Ultimately transferred to LTAC successfully on 9/1/2020.    Vitals:    09/01/20 1141   BP: (!) 89/54   Pulse: 77   Resp: 16   Temp: 96.2 °F (35.7 °C)     Physical Exam  Constitutional:       Appearance: Normal appearance.   Eyes:      Conjunctiva/sclera: Conjunctivae normal.      Pupils: Pupils are equal, round, and reactive to light.   Cardiovascular:      Rate and Rhythm: Normal rate and regular rhythm.      Pulses: Normal pulses.      Heart sounds: Normal heart sounds.   Pulmonary:      Effort: Pulmonary effort is normal. No respiratory distress.      Breath sounds: Normal breath sounds.   Abdominal:      General: Bowel sounds are normal. There is no distension.      Palpations: Abdomen is soft.      Tenderness: There is no abdominal tenderness.   Skin:     General: Skin is warm.       Findings: No bruising or rash.   Neurological:      Mental Status: He is alert and oriented to person, place, and time.   Psychiatric:         Behavior: Behavior is calm      Consults:   Consults (From admission, onward)        Status Ordering Provider     Inpatient consult to Infectious Diseases  Once     Provider:  (Not yet assigned)    Completed BARRETT SHEN     Inpatient consult to Infectious Diseases  Once     Provider:  (Not yet assigned)    Completed LIANNE CODY     Inpatient consult to Orthopedics  Once     Provider:  (Not yet assigned)    Completed MAYELA LOYA     Inpatient consult to Pain Management  Once     Provider:  (Not yet assigned)    Acknowledged JEANETTE DOOLEY     Inpatient consult to PICC team ([facility])  Once     Provider:  (Not yet assigned)    Completed LISA BONILLA     Inpatient consult to Psychiatry  Once     Provider:  (Not yet assigned)    Completed MAYELA LOYA     Inpatient virtual consult to Hospital Medicine  Once     Provider:  (Not yet assigned)    Completed BAILEE HYLTON     Pharmacy to dose Vancomycin consult  Once     Provider:  (Not yet assigned)    Acknowledged LIANNE CODY          No new Assessment & Plan notes have been filed under this hospital service since the last note was generated.  Service: Hospital Medicine    Final Active Diagnoses:    Diagnosis Date Noted POA    PRINCIPAL PROBLEM:  SIRS (systemic inflammatory response syndrome) [R65.10] 08/19/2020 Yes    Opioid use disorder, severe, dependence [F11.20] 08/20/2020 Yes     Chronic    Opioid withdrawal [F11.23] 08/20/2020 Yes    Elevated erythrocyte sedimentation rate [R70.0] 08/19/2020 Yes    Elevated C-reactive protein (CRP) [R79.82] 08/19/2020 Yes    IVDU (intravenous drug user) [F19.90] 08/19/2020 Yes    Oligoarthritis of multiple sites - L shoulder, R knee, R ankle [M13.89] 08/19/2020 Yes    Anxiety and depression [F41.9, F32.9] 08/19/2020 Yes    Septic  arthritis of knee, right [M00.9] 08/19/2020 Yes    Septic arthritis of shoulder, left [M00.9] 08/19/2020 Yes    Septic arthritis of right ankle [M00.9] 08/19/2020 Yes      Problems Resolved During this Admission:       Discharged Condition: stable    Disposition: Long Term Care    Follow Up:  Follow-up Information     [facility].    Specialty: LTAC  Contact information:  [address] [place] [address] [place] [address] [phone]                 Patient Instructions:      Ambulatory referral/consult to Hepatitis C Clinic   Standing Status: Future   Referral Priority: Routine Referral Type: Consultation   Number of Visits Requested: 1       Pending Diagnostic Studies:     None         Medications:     Discharge antibiotics:           - vancomycin IV 1250mg q8hr  - ceftriaxone [id number]   - End date of IV antibiotics: 9/16/     Medication List      START taking these medications    acetaminophen 325 MG tablet  Commonly known as: TYLENOL  Take 2 tablets (650 mg total) by mouth 3 (three) times daily.     amitriptyline 25 MG tablet  Commonly known as: ELAVIL  Take 1 tablet (25 mg total) by mouth every evening.     cefTRIAXone 2 g/50 mL Pgbk IVPB  Commonly known as: ROCEPHIN  Inject 50 mLs (2 g total) into the vein once daily. for 21 days     dextrose 5 % SolP 500 mL with vancomycin 1,000 mg SolR 2,250 mg  Inject 2,250 mg into the vein every 12 (twelve) hours. for 16 days     dicyclomine 10 MG capsule  Commonly known as: BENTYL  Take 1 capsule (10 mg total) by mouth every 6 (six) hours as needed (abdominal cramps).     hydrOXYzine pamoate 50 MG Cap  Commonly known as: VISTARIL  Take 1 capsule (50 mg total) by mouth nightly as needed (Insomnia).     ibuprofen 800 MG tablet  Commonly known as: ADVIL,MOTRIN  Take 1 tablet (800 mg total) by mouth 3 (three) times daily.     loperamide 2 mg capsule  Commonly known as: IMODIUM  Take 1 capsule (2 mg total) by mouth every 6 (six) hours as needed for Diarrhea.      methocarbamoL 750 MG Tab  Commonly known as: ROBAXIN  Take 1 tablet (750 mg total) by mouth every 6 (six) hours. for 10 days     ondansetron 8 MG Tbdl  Commonly known as: ZOFRAN-ODT  Take 1 tablet (8 mg total) by mouth every 8 (eight) hours as needed.     pantoprazole 40 MG tablet  Commonly known as: PROTONIX  Take 1 tablet (40 mg total) by mouth once daily.        CHANGE how you take these medications    gabapentin 300 MG capsule  Commonly known as: NEURONTIN  Take 1 capsule (300 mg total) by mouth 2 (two) times daily.  What changed: when to take this        CONTINUE taking these medications    ARIPiprazole 5 MG Tab  Commonly known as: ABILIFY  Take 5 mg by mouth once daily.     FLUoxetine 20 MG capsule  Take 1 capsule (20 mg total) by mouth once daily.        STOP taking these medications    buprenorphine-naloxone 8-2 mg Film  Commonly known as: SUBOXONE  -- Addiction psych consult at AC for pt to resume Suboxone     buPROPion 150 MG TB24 tablet  Commonly known as: WELLBUTRIN XL     cloNIDine 0.1 MG tablet  Commonly known as: CATAPRES                  Indwelling Lines/Drains at time of discharge:   Lines/Drains/Airways     None                 Time spent on the discharge of patient: 35 minutes  Patient was seen and examined on the date of discharge and determined to be suitable for discharge.         Jay Lin MD  Department of Hospital Medicine  [facility]

## 2020-09-01 NOTE — PLAN OF CARE
[facility]    FACILITY TRANSFER ORDERS      Patient Name: Bebo Koroma  YOB: 1995    PCP: Primary Doctor No   PCP Address: None  PCP Phone Number: None  PCP Fax: None    Encounter Date: 09/01/2020    Admit to: LTAC    Vital Signs:  Routine    Diagnoses:   Active Hospital Problems    Diagnosis  POA    *SIRS (systemic inflammatory response syndrome) [R65.10]  Yes    Opioid use disorder, severe, dependence [F11.20]  Yes     Chronic    Opioid withdrawal [F11.23]  Yes    Elevated erythrocyte sedimentation rate [R70.0]  Yes    Elevated C-reactive protein (CRP) [R79.82]  Yes    IVDU (intravenous drug user) [F19.90]  Yes    Oligoarthritis of multiple sites - L shoulder, R knee, R ankle [M13.89]  Yes    Anxiety and depression [F41.9, F32.9]  Yes    Septic arthritis of knee, right [M00.9]  Yes    Septic arthritis of shoulder, left [M00.9]  Yes    Septic arthritis of right ankle [M00.9]  Yes      Resolved Hospital Problems   No resolved problems to display.       Allergies:  Review of patient's allergies indicates:   Allergen Reactions    Penicillins Hives     Unclear of last episode of allergic reaction  Tolerated ceftriaxone 08/2020       Diet: regular diet    Activities: Activity as tolerated    Nursing: per facility protocol     Labs:   - Per Facility Protocol   - Weekly outpatient laboratory on Monday or Tuesday while on IV antibiotics.   · CBC  · CMP or BMP  · ESR and CRP  · Vancomycin trough. Target 15-20     If vancomycin trough is not at target (15-20) prior to discharge, the please perform vancomycin trough before their fourth outpatient dose.     Fax laboratory results to [facility] at [phone] with attn: SANCHO SOSA     Outpatient Infectious Diseases clinic follow up will be arranged and found in patient calendar.      CONSULTS:    - Psychiatry/Addiction Psychiatry if needed for Suboxone     Medications: Review discharge medications with patient and family and  provide education.      Discharge antibiotics:           - vancomycin IV 1250mg q8hr  - ceftriaxone 7bb55pi   - End date of IV antibiotics: 9/16/2020      Current Discharge Medication List      START taking these medications    Details   acetaminophen (TYLENOL) 325 MG tablet Take 2 tablets (650 mg total) by mouth 3 (three) times daily.  Refills: 0      amitriptyline (ELAVIL) 25 MG tablet Take 1 tablet (25 mg total) by mouth every evening.  Qty: 30 tablet, Refills: 11      cefTRIAXone (ROCEPHIN) 2 g/50 mL PgBk IVPB Inject 50 mLs (2 g total) into the vein once daily. for 21 days  Qty: 21 each, Refills: 0      dextrose 5 % SolP 500 mL with vancomycin 1,000 mg SolR 2,250 mg Inject 2,250 mg into the vein every 12 (twelve) hours. for 16 days  Refills: 0      dicyclomine (BENTYL) 10 MG capsule Take 1 capsule (10 mg total) by mouth every 6 (six) hours as needed (abdominal cramps).  Qty: 120 capsule, Refills: 0      hydrOXYzine pamoate (VISTARIL) 50 MG Cap Take 1 capsule (50 mg total) by mouth nightly as needed (Insomnia).  Qty: 30 capsule, Refills: 0      ibuprofen (ADVIL,MOTRIN) 800 MG tablet Take 1 tablet (800 mg total) by mouth 3 (three) times daily.      loperamide (IMODIUM) 2 mg capsule Take 1 capsule (2 mg total) by mouth every 6 (six) hours as needed for Diarrhea.  Qty: 40 capsule, Refills: 0      methocarbamoL (ROBAXIN) 750 MG Tab Take 1 tablet (750 mg total) by mouth every 6 (six) hours. for 10 days  Qty: 40 tablet, Refills: 0      ondansetron (ZOFRAN-ODT) 8 MG TbDL Take 1 tablet (8 mg total) by mouth every 8 (eight) hours as needed.  Qty: 6 tablet      oxyCODONE (ROXICODONE) 10 mg Tab immediate release tablet Take 1 tablet (10 mg total) by mouth every 6 (six) hours as needed for Pain.  Qty: 20 tablet, Refills: 0    Comments: Quantity prescribed more than 7 day supply? No      pantoprazole (PROTONIX) 40 MG tablet Take 1 tablet (40 mg total) by mouth once daily.  Qty: 30 tablet, Refills: 11         CONTINUE these  medications which have CHANGED    Details   FLUoxetine 20 MG capsule Take 1 capsule (20 mg total) by mouth once daily.  Qty: 30 capsule, Refills: 11      gabapentin (NEURONTIN) 300 MG capsule Take 1 capsule (300 mg total) by mouth 2 (two) times daily.  Qty: 60 capsule, Refills: 11         CONTINUE these medications which have NOT CHANGED    Details   ARIPiprazole (ABILIFY) 5 MG Tab Take 5 mg by mouth once daily.          STOP taking these medications       buprenorphine-naloxone (SUBOXONE) 8-2 mg Film Comments:   Reason for Stopping:         buPROPion (WELLBUTRIN XL) 150 MG TB24 tablet Comments:   Reason for Stopping:         cloNIDine (CATAPRES) 0.1 MG tablet Comments:   Reason for Stopping:                    _________________________________  Jay Lin MD  09/01/2020

## 2020-09-01 NOTE — PLAN OF CARE
Sw setup w/c van transport for 5pm, nurse to call report to [facility] at  and room is #3.

## 2020-09-01 NOTE — PLAN OF CARE
Kamran with [facility] in [place] has the auth and the orders. Pt will need PICC line prior d/c, Sw updated IM team and clarified with nurse that Pt does not have a PICC. PICC ordered, spoke with Kamran, will follow with placement of PICC and transport.

## 2020-09-01 NOTE — PLAN OF CARE
Problem: Fall Injury Risk  Goal: Absence of Fall and Fall-Related Injury  Outcome: Ongoing, Progressing     Problem: Adult Inpatient Plan of Care  Goal: Plan of Care Review  Outcome: Ongoing, Progressing     Problem: Skin Injury Risk Increased  Goal: Skin Health and Integrity  Outcome: Ongoing, Progressing   ALert and orient times 4. State pain was level 4 prior to giving schedule tylenol and ibuprofen and was asleep after receiving . Receiving IV Vancomycin. No fall. Small dressing to left shoulder dry and intact..

## 2020-09-01 NOTE — PLAN OF CARE
[facility], PICC line ordered,  will cont to follow.     09/01/20 1505   Discharge Reassessment   Assessment Type Discharge Planning Reassessment   Provided patient/caregiver education on the expected discharge date and the discharge plan Yes   Do you have any problems affording any of your prescribed medications? No   Discharge Plan A Long-term acute care facility (LTAC)   Discharge Plan B Long-term acute care facility (LTAC)   DME Needed Upon Discharge  none   Anticipated Discharge Disposition LTAC   Can the patient/caregiver answer the patient profile reliably? Yes, cognitively intact   How does the patient rate their overall health at the present time? Good   Describe the patient's ability to walk at the present time. No restrictions   How often would a person be available to care for the patient? Often   Number of comorbid conditions (as recorded on the chart) Two   Post-Acute Status   Post-Acute Authorization Placement   Post-Acute Placement Status Awaiting Internal Medical Clearance   Discharge Delays None known at this time   Elham Nobles, MSN  Case Management  Ext [phone]

## 2020-09-02 ENCOUNTER — NURSE TRIAGE (OUTPATIENT)
Dept: ADMINISTRATIVE | Facility: CLINIC | Age: 25
End: 2020-09-02

## 2020-09-02 NOTE — TELEPHONE ENCOUNTER
Pt did not answer.  Per PP guidelines, no voicemail left and no further PP tracking warranted.  The pt's procedure was on 8/19/20.    Reason for Disposition   No answer.  First attempt to contact caller.  Follow-up call scheduled within 15 minutes.    Protocols used: NO CONTACT OR DUPLICATE CONTACT CALL-A-AH

## 2020-09-08 ENCOUNTER — DOCUMENTATION ONLY (OUTPATIENT)
Dept: TRANSPLANT | Facility: CLINIC | Age: 25
End: 2020-09-08

## 2020-09-09 NOTE — NURSING
Pt records reviewed.   Pt will be referred to Hepatitis C clinic due to Hep C Ab+    Initial referral received  from the workque.   Referring Provider/diagnosis  Ender Fan,    Referral letter sent to  patient.

## 2020-09-14 ENCOUNTER — TELEPHONE (OUTPATIENT)
Dept: HEPATOLOGY | Facility: CLINIC | Age: 25
End: 2020-09-14

## 2020-09-14 NOTE — TELEPHONE ENCOUNTER
----- Message from Abeba Silva sent at 9/8/2020 10:16 PM CDT -----  Pt records reviewed.   Pt will be referred to Hepatitis C clinic due to Hep C Ab+    Initial referral received  from the workque.   Referring Provider/diagnosis  Ender Fan,    Referral letter sent to  patient.

## 2020-09-16 LAB
FUNGUS SPEC CULT: NORMAL

## 2020-10-21 LAB
ACID FAST MOD KINY STN SPEC: NORMAL
MYCOBACTERIUM SPEC QL CULT: NORMAL

## 2021-03-25 NOTE — CONSULTS
"NIAS at bedside to place PICC line. Dr. Barrios entered room to stop the placement of PICC line at this time. The facility to which the patient will be going to will reevaluate patient for PICC line placement in the next coming days. Pt's current PIV to RFA leaking. PIV removed and new PIV placed to RUE.    20g 1 3/4" PIV placed to RUE using ultrasound guidance.    Line placed by: UMA Ramires RN  " Kalpana Domínguez McDonough  [address] [facility] [place] [address]      March 25, 2021    YOB: 2010    To Whom it may concern:    From a cardiac standpoint Kalpana Rod may proceed with dental work as needed, with usual risks.    The patient can receive anesthesia as required.    According to the American Heart Association guidelines, SBE prophylaxis is not needed.    If there are any questions or concerns, please do not hesitate to call at  Dept: [phone].    Sincerely,          Danna Ratliff MD   [facility] [address]  [address]  [place] [address]  Dept Phone: [phone]

## (undated) DEVICE — TUBE SET INFLOW/OUTFLOW

## (undated) DEVICE — GAUZE SPONGE 4X4 12PLY

## (undated) DEVICE — DRAPE EMERALD 87X114.75X113

## (undated) DEVICE — SOL WATER STRL IRR 1000ML

## (undated) DEVICE — SEE MEDLINE ITEM 157160

## (undated) DEVICE — SUT 4.0 ETHILON

## (undated) DEVICE — SPONGE GAUZE 16PLY 4X4

## (undated) DEVICE — TAPE SURG DURAPORE 2 X10YD

## (undated) DEVICE — SEE MEDLINE ITEM 152530

## (undated) DEVICE — DRESSING TEGADERM 4.4X5IN

## (undated) DEVICE — BLADE SURG CARBON STEEL SZ11

## (undated) DEVICE — DRAPE PLASTIC U 60X72

## (undated) DEVICE — SOL IRR NACL .9% 3000ML

## (undated) DEVICE — IMMOB SHOULDER ELASTIC LARGE

## (undated) DEVICE — DRAPE STERI-DRAPE 1000 17X11IN

## (undated) DEVICE — TRAY MINOR ORTHO

## (undated) DEVICE — PAD ABD 8X10 STERILE

## (undated) DEVICE — SEE MEDLINE ITEM 157216

## (undated) DEVICE — TOURNIQUET SB QC SP 34X4IN

## (undated) DEVICE — DRAPE INCISE IOBAN 2 23X17IN

## (undated) DEVICE — SYR 30CC LUER LOCK

## (undated) DEVICE — SUT ETHILON 3/0 18IN PS-1

## (undated) DEVICE — NDL ECLIPSE SAFETY 18GX1-1/2IN

## (undated) DEVICE — BANDAGE ACE ELASTIC 6"

## (undated) DEVICE — ELECTRODE REM PLYHSV RETURN 9

## (undated) DEVICE — DRESSING N ADH OIL EMUL 3X8

## (undated) DEVICE — GOWN SURG 2XL DISP TIE BACK

## (undated) DEVICE — SHEET DRAPE FAN-FOLDED 3/4

## (undated) DEVICE — NDL SPINAL 18GX3.5 SPINOCAN

## (undated) DEVICE — NDL 18GA X1 1/2 REG BEVEL

## (undated) DEVICE — DRAPE STERI U-SHAPED 47X51IN

## (undated) DEVICE — APPLICATOR CHLORAPREP ORN 26ML

## (undated) DEVICE — SEE MEDLINE ITEM 157131

## (undated) DEVICE — COVER MAYO STAND REINFRCD 30

## (undated) DEVICE — TRAY ARTHROSCOPY 2/CS

## (undated) DEVICE — CANNULA SHOULDER 10/BOX